# Patient Record
Sex: FEMALE | Race: WHITE | NOT HISPANIC OR LATINO
[De-identification: names, ages, dates, MRNs, and addresses within clinical notes are randomized per-mention and may not be internally consistent; named-entity substitution may affect disease eponyms.]

---

## 2017-03-06 ENCOUNTER — LABORATORY RESULT (OUTPATIENT)
Age: 62
End: 2017-03-06

## 2017-03-06 ENCOUNTER — APPOINTMENT (OUTPATIENT)
Dept: INFUSION THERAPY | Facility: CLINIC | Age: 62
End: 2017-03-06

## 2017-03-06 ENCOUNTER — APPOINTMENT (OUTPATIENT)
Dept: HEMATOLOGY ONCOLOGY | Facility: CLINIC | Age: 62
End: 2017-03-06

## 2017-03-06 VITALS
HEIGHT: 63 IN | TEMPERATURE: 98.6 F | DIASTOLIC BLOOD PRESSURE: 72 MMHG | HEART RATE: 72 BPM | WEIGHT: 203 LBS | SYSTOLIC BLOOD PRESSURE: 112 MMHG | BODY MASS INDEX: 35.97 KG/M2

## 2017-03-06 LAB
ALBUMIN SERPL ELPH-MCNC: 3.7 G/DL
ALP BLD-CCNC: 93 U/L
ALT SERPL-CCNC: 21 U/L
ANION GAP SERPL CALC-SCNC: 16 MMOL/L
AST SERPL-CCNC: 18 U/L
BILIRUB SERPL-MCNC: 0.7 MG/DL
BUN SERPL-MCNC: 14 MG/DL
CALCIUM SERPL-MCNC: 9 MG/DL
CHLORIDE SERPL-SCNC: 104 MMOL/L
CO2 SERPL-SCNC: 23 MMOL/L
CREAT SERPL-MCNC: 0.69 MG/DL
GLUCOSE SERPL-MCNC: 109 MG/DL
HCT VFR BLD CALC: 34 %
HGB BLD-MCNC: 12 G/DL
MCHC RBC-ENTMCNC: 29.9 PG
MCHC RBC-ENTMCNC: 35.2 GM/DL
MCV RBC AUTO: 84.9 FL
PLATELET # BLD AUTO: 125 K/UL
POTASSIUM SERPL-SCNC: 4.2 MMOL/L
PROT SERPL-MCNC: 5.8 G/DL
RBC # BLD: 4.01 M/UL
RBC # FLD: 11.5 %
SODIUM SERPL-SCNC: 143 MMOL/L
WBC # FLD AUTO: 3.9 K/UL

## 2017-03-10 PROBLEM — Z87.19 HISTORY OF ESOPHAGEAL REFLUX: Status: RESOLVED | Noted: 2017-03-10 | Resolved: 2017-03-10

## 2017-03-10 PROBLEM — Z79.899 PATIENT ON COMBINED CHEMOTHERAPY AND RADIATION: Status: RESOLVED | Noted: 2017-03-10 | Resolved: 2017-03-10

## 2017-03-10 LAB — VIT B12 SERPL-MCNC: 261 PG/ML

## 2017-03-13 ENCOUNTER — APPOINTMENT (OUTPATIENT)
Dept: INFUSION THERAPY | Facility: CLINIC | Age: 62
End: 2017-03-13

## 2017-03-13 ENCOUNTER — APPOINTMENT (OUTPATIENT)
Dept: HEMATOLOGY ONCOLOGY | Facility: CLINIC | Age: 62
End: 2017-03-13

## 2017-03-13 VITALS
DIASTOLIC BLOOD PRESSURE: 70 MMHG | SYSTOLIC BLOOD PRESSURE: 110 MMHG | HEIGHT: 63 IN | BODY MASS INDEX: 35.97 KG/M2 | TEMPERATURE: 98 F | HEART RATE: 70 BPM | WEIGHT: 203 LBS

## 2017-03-13 DIAGNOSIS — Z79.899 OTHER LONG TERM (CURRENT) DRUG THERAPY: ICD-10-CM

## 2017-03-13 DIAGNOSIS — E55.9 VITAMIN D DEFICIENCY, UNSPECIFIED: ICD-10-CM

## 2017-03-13 DIAGNOSIS — G62.9 POLYNEUROPATHY, UNSPECIFIED: ICD-10-CM

## 2017-03-13 DIAGNOSIS — S80.01XA CONTUSION OF RIGHT KNEE, INITIAL ENCOUNTER: ICD-10-CM

## 2017-03-13 DIAGNOSIS — M79.89 OTHER SPECIFIED SOFT TISSUE DISORDERS: ICD-10-CM

## 2017-03-13 DIAGNOSIS — K12.30 ORAL MUCOSITIS (ULCERATIVE), UNSPECIFIED: ICD-10-CM

## 2017-03-13 DIAGNOSIS — Z87.19 PERSONAL HISTORY OF OTHER DISEASES OF THE DIGESTIVE SYSTEM: ICD-10-CM

## 2017-03-13 DIAGNOSIS — Z78.9 OTHER LONG TERM (CURRENT) DRUG THERAPY: ICD-10-CM

## 2017-03-13 DIAGNOSIS — N30.40 IRRADIATION CYSTITIS W/OUT HEMATURIA: ICD-10-CM

## 2017-03-13 DIAGNOSIS — Z86.2 PERSONAL HISTORY OF DISEASES OF THE BLOOD AND BLOOD-FORMING ORGANS AND CERTAIN DISORDERS INVOLVING THE IMMUNE MECHANISM: ICD-10-CM

## 2017-03-13 DIAGNOSIS — Z86.19 PERSONAL HISTORY OF OTHER INFECTIOUS AND PARASITIC DISEASES: ICD-10-CM

## 2017-03-17 ENCOUNTER — APPOINTMENT (OUTPATIENT)
Dept: DERMATOLOGY | Facility: CLINIC | Age: 62
End: 2017-03-17

## 2017-03-17 DIAGNOSIS — Z78.9 OTHER SPECIFIED HEALTH STATUS: ICD-10-CM

## 2017-03-17 DIAGNOSIS — Z85.828 PERSONAL HISTORY OF OTHER MALIGNANT NEOPLASM OF SKIN: ICD-10-CM

## 2017-05-01 ENCOUNTER — OUTPATIENT (OUTPATIENT)
Dept: OUTPATIENT SERVICES | Facility: HOSPITAL | Age: 62
LOS: 1 days | Discharge: HOME | End: 2017-05-01
Payer: COMMERCIAL

## 2017-05-01 VITALS
TEMPERATURE: 98 F | OXYGEN SATURATION: 99 % | RESPIRATION RATE: 16 BRPM | SYSTOLIC BLOOD PRESSURE: 133 MMHG | DIASTOLIC BLOOD PRESSURE: 65 MMHG | HEART RATE: 78 BPM

## 2017-05-01 VITALS
RESPIRATION RATE: 16 BRPM | OXYGEN SATURATION: 100 % | TEMPERATURE: 98 F | SYSTOLIC BLOOD PRESSURE: 115 MMHG | HEIGHT: 63 IN | WEIGHT: 220.46 LBS | DIASTOLIC BLOOD PRESSURE: 80 MMHG | HEART RATE: 66 BPM

## 2017-05-01 DIAGNOSIS — Z98.890 OTHER SPECIFIED POSTPROCEDURAL STATES: Chronic | ICD-10-CM

## 2017-05-01 DIAGNOSIS — Z98.84 BARIATRIC SURGERY STATUS: Chronic | ICD-10-CM

## 2017-05-01 DIAGNOSIS — Z90.49 ACQUIRED ABSENCE OF OTHER SPECIFIED PARTS OF DIGESTIVE TRACT: Chronic | ICD-10-CM

## 2017-05-01 DIAGNOSIS — Z84.89 FAMILY HISTORY OF OTHER SPECIFIED CONDITIONS: Chronic | ICD-10-CM

## 2017-05-01 PROCEDURE — 93010 ELECTROCARDIOGRAM REPORT: CPT

## 2017-05-01 PROCEDURE — 36590 REMOVAL TUNNELED CV CATH: CPT

## 2017-05-01 NOTE — BRIEF OPERATIVE NOTE - OPERATION/FINDINGS
pre and post cxr. sharp and blunt dissection to expose port which was removed intact. pt juany proc well. no complications

## 2017-05-01 NOTE — ASU PATIENT PROFILE, ADULT - PMH
Degenerative disc disease, lumbar    Gastroesophageal reflux disease, esophagitis presence not specified    Lung nodule    Rectal cancer    Thrombocytopenia    Vitamin D deficiency

## 2017-05-01 NOTE — ASU PATIENT PROFILE, ADULT - PSH
FH: cholecystectomy    H/O umbilical hernia repair    History of partial colectomy    S/P left knee arthroscopy    Status post gastric banding

## 2017-05-03 ENCOUNTER — TRANSCRIPTION ENCOUNTER (OUTPATIENT)
Age: 62
End: 2017-05-03

## 2017-05-04 DIAGNOSIS — Z87.891 PERSONAL HISTORY OF NICOTINE DEPENDENCE: ICD-10-CM

## 2017-05-04 DIAGNOSIS — Z98.84 BARIATRIC SURGERY STATUS: ICD-10-CM

## 2017-05-04 DIAGNOSIS — Z46.89 ENCOUNTER FOR FITTING AND ADJUSTMENT OF OTHER SPECIFIED DEVICES: ICD-10-CM

## 2017-05-04 DIAGNOSIS — E66.01 MORBID (SEVERE) OBESITY DUE TO EXCESS CALORIES: ICD-10-CM

## 2017-05-04 DIAGNOSIS — Z92.3 PERSONAL HISTORY OF IRRADIATION: ICD-10-CM

## 2017-05-04 DIAGNOSIS — G47.33 OBSTRUCTIVE SLEEP APNEA (ADULT) (PEDIATRIC): ICD-10-CM

## 2017-05-04 DIAGNOSIS — Z92.21 PERSONAL HISTORY OF ANTINEOPLASTIC CHEMOTHERAPY: ICD-10-CM

## 2017-05-04 DIAGNOSIS — Z85.048 PERSONAL HISTORY OF OTHER MALIGNANT NEOPLASM OF RECTUM, RECTOSIGMOID JUNCTION, AND ANUS: ICD-10-CM

## 2017-05-04 DIAGNOSIS — K21.9 GASTRO-ESOPHAGEAL REFLUX DISEASE WITHOUT ESOPHAGITIS: ICD-10-CM

## 2017-05-12 ENCOUNTER — APPOINTMENT (OUTPATIENT)
Dept: ORTHOPEDIC SURGERY | Facility: CLINIC | Age: 62
End: 2017-05-12

## 2017-05-12 ENCOUNTER — CHART COPY (OUTPATIENT)
Age: 62
End: 2017-05-12

## 2017-05-12 VITALS
BODY MASS INDEX: 35.97 KG/M2 | DIASTOLIC BLOOD PRESSURE: 87 MMHG | HEART RATE: 75 BPM | WEIGHT: 203 LBS | HEIGHT: 63 IN | SYSTOLIC BLOOD PRESSURE: 132 MMHG

## 2017-05-15 ENCOUNTER — APPOINTMENT (OUTPATIENT)
Dept: ORTHOPEDIC SURGERY | Facility: CLINIC | Age: 62
End: 2017-05-15

## 2017-05-15 VITALS — BODY MASS INDEX: 35.97 KG/M2 | WEIGHT: 203 LBS | HEIGHT: 63 IN

## 2017-06-02 ENCOUNTER — APPOINTMENT (OUTPATIENT)
Dept: ORTHOPEDIC SURGERY | Facility: CLINIC | Age: 62
End: 2017-06-02

## 2017-06-02 VITALS
WEIGHT: 203 LBS | SYSTOLIC BLOOD PRESSURE: 138 MMHG | HEART RATE: 83 BPM | DIASTOLIC BLOOD PRESSURE: 70 MMHG | HEIGHT: 63 IN | BODY MASS INDEX: 35.97 KG/M2

## 2017-06-09 ENCOUNTER — APPOINTMENT (OUTPATIENT)
Dept: ORTHOPEDIC SURGERY | Facility: CLINIC | Age: 62
End: 2017-06-09

## 2017-06-09 VITALS
DIASTOLIC BLOOD PRESSURE: 75 MMHG | BODY MASS INDEX: 35.97 KG/M2 | SYSTOLIC BLOOD PRESSURE: 110 MMHG | WEIGHT: 203 LBS | HEIGHT: 63 IN | HEART RATE: 72 BPM

## 2017-06-16 ENCOUNTER — APPOINTMENT (OUTPATIENT)
Dept: ORTHOPEDIC SURGERY | Facility: CLINIC | Age: 62
End: 2017-06-16

## 2017-06-16 VITALS
SYSTOLIC BLOOD PRESSURE: 143 MMHG | HEART RATE: 65 BPM | DIASTOLIC BLOOD PRESSURE: 90 MMHG | HEIGHT: 63 IN | WEIGHT: 203 LBS | BODY MASS INDEX: 35.97 KG/M2

## 2017-06-19 ENCOUNTER — OUTPATIENT (OUTPATIENT)
Dept: OUTPATIENT SERVICES | Facility: HOSPITAL | Age: 62
LOS: 1 days | End: 2017-06-19
Payer: COMMERCIAL

## 2017-06-19 ENCOUNTER — APPOINTMENT (OUTPATIENT)
Dept: MRI IMAGING | Facility: CLINIC | Age: 62
End: 2017-06-19

## 2017-06-19 DIAGNOSIS — Z90.49 ACQUIRED ABSENCE OF OTHER SPECIFIED PARTS OF DIGESTIVE TRACT: Chronic | ICD-10-CM

## 2017-06-19 DIAGNOSIS — Z84.89 FAMILY HISTORY OF OTHER SPECIFIED CONDITIONS: Chronic | ICD-10-CM

## 2017-06-19 DIAGNOSIS — Z98.890 OTHER SPECIFIED POSTPROCEDURAL STATES: Chronic | ICD-10-CM

## 2017-06-19 DIAGNOSIS — Z00.8 ENCOUNTER FOR OTHER GENERAL EXAMINATION: ICD-10-CM

## 2017-06-19 DIAGNOSIS — Z98.84 BARIATRIC SURGERY STATUS: Chronic | ICD-10-CM

## 2017-06-19 PROCEDURE — 73221 MRI JOINT UPR EXTREM W/O DYE: CPT

## 2017-07-10 ENCOUNTER — APPOINTMENT (OUTPATIENT)
Dept: ORTHOPEDIC SURGERY | Facility: CLINIC | Age: 62
End: 2017-07-10

## 2017-07-10 VITALS
BODY MASS INDEX: 35.61 KG/M2 | HEIGHT: 63 IN | HEART RATE: 54 BPM | SYSTOLIC BLOOD PRESSURE: 119 MMHG | DIASTOLIC BLOOD PRESSURE: 75 MMHG | WEIGHT: 201 LBS

## 2017-07-10 RX ADMIN — METHYLPREDNISOLONE ACETATE 2 MG/ML: 40 INJECTION, SUSPENSION INTRALESIONAL; INTRAMUSCULAR; INTRASYNOVIAL; SOFT TISSUE at 00:00

## 2017-07-10 RX ADMIN — LIDOCAINE HYDROCHLORIDE 3 %: 10 INJECTION, SOLUTION EPIDURAL; INFILTRATION; INTRACAUDAL; PERINEURAL at 00:00

## 2017-07-13 RX ORDER — METHYLPRED ACET/NACL,ISO-OS/PF 40 MG/ML
40 VIAL (ML) INJECTION
Qty: 1 | Refills: 0 | Status: COMPLETED | OUTPATIENT
Start: 2017-07-10

## 2017-07-13 RX ORDER — LIDOCAINE HYDROCHLORIDE 10 MG/ML
1 INJECTION, SOLUTION INFILTRATION; PERINEURAL
Refills: 0 | Status: COMPLETED | OUTPATIENT
Start: 2017-07-10

## 2017-07-14 ENCOUNTER — APPOINTMENT (OUTPATIENT)
Dept: ORTHOPEDIC SURGERY | Facility: CLINIC | Age: 62
End: 2017-07-14

## 2017-09-29 ENCOUNTER — OUTPATIENT (OUTPATIENT)
Dept: OUTPATIENT SERVICES | Facility: HOSPITAL | Age: 62
LOS: 1 days | End: 2017-09-29
Payer: COMMERCIAL

## 2017-09-29 ENCOUNTER — APPOINTMENT (OUTPATIENT)
Dept: CT IMAGING | Facility: CLINIC | Age: 62
End: 2017-09-29
Payer: COMMERCIAL

## 2017-09-29 DIAGNOSIS — Z00.8 ENCOUNTER FOR OTHER GENERAL EXAMINATION: ICD-10-CM

## 2017-09-29 DIAGNOSIS — Z98.84 BARIATRIC SURGERY STATUS: Chronic | ICD-10-CM

## 2017-09-29 DIAGNOSIS — Z98.890 OTHER SPECIFIED POSTPROCEDURAL STATES: Chronic | ICD-10-CM

## 2017-09-29 DIAGNOSIS — Z84.89 FAMILY HISTORY OF OTHER SPECIFIED CONDITIONS: Chronic | ICD-10-CM

## 2017-09-29 DIAGNOSIS — Z90.49 ACQUIRED ABSENCE OF OTHER SPECIFIED PARTS OF DIGESTIVE TRACT: Chronic | ICD-10-CM

## 2017-09-29 PROCEDURE — 71260 CT THORAX DX C+: CPT | Mod: 26

## 2017-09-29 PROCEDURE — 71260 CT THORAX DX C+: CPT

## 2017-09-29 PROCEDURE — 74177 CT ABD & PELVIS W/CONTRAST: CPT

## 2017-09-29 PROCEDURE — 82565 ASSAY OF CREATININE: CPT

## 2017-09-29 PROCEDURE — 74177 CT ABD & PELVIS W/CONTRAST: CPT | Mod: 26

## 2018-01-03 ENCOUNTER — LABORATORY RESULT (OUTPATIENT)
Age: 63
End: 2018-01-03

## 2018-01-03 ENCOUNTER — APPOINTMENT (OUTPATIENT)
Dept: INFUSION THERAPY | Facility: CLINIC | Age: 63
End: 2018-01-03

## 2018-01-03 ENCOUNTER — APPOINTMENT (OUTPATIENT)
Dept: HEMATOLOGY ONCOLOGY | Facility: CLINIC | Age: 63
End: 2018-01-03
Payer: COMMERCIAL

## 2018-01-03 VITALS
BODY MASS INDEX: 37.56 KG/M2 | HEIGHT: 63 IN | TEMPERATURE: 98.8 F | DIASTOLIC BLOOD PRESSURE: 84 MMHG | SYSTOLIC BLOOD PRESSURE: 130 MMHG | HEART RATE: 83 BPM | WEIGHT: 212 LBS

## 2018-01-03 LAB
HCT VFR BLD CALC: 38.5 %
HGB BLD-MCNC: 13 G/DL
MCHC RBC-ENTMCNC: 29.6 PG
MCHC RBC-ENTMCNC: 33.8 GM/DL
MCV RBC AUTO: 87.4 FL
PLATELET # BLD AUTO: 167 K/UL
RBC # BLD: 4.4 M/UL
RBC # FLD: 11.6 %
WBC # FLD AUTO: 5.2 K/UL

## 2018-01-03 PROCEDURE — 99214 OFFICE O/P EST MOD 30 MIN: CPT | Mod: 25

## 2018-01-03 PROCEDURE — 36415 COLL VENOUS BLD VENIPUNCTURE: CPT

## 2018-01-03 PROCEDURE — 85025 COMPLETE CBC W/AUTO DIFF WBC: CPT

## 2018-01-03 PROCEDURE — 96372 THER/PROPH/DIAG INJ SC/IM: CPT | Mod: 59

## 2018-01-16 LAB
ALBUMIN SERPL ELPH-MCNC: 4 G/DL
ALP BLD-CCNC: 82 U/L
ALT SERPL-CCNC: 14 U/L
ANION GAP SERPL CALC-SCNC: 16 MMOL/L
AST SERPL-CCNC: 15 U/L
BILIRUB SERPL-MCNC: 0.5 MG/DL
BUN SERPL-MCNC: 16 MG/DL
CALCIUM SERPL-MCNC: 9.7 MG/DL
CEA SERPL-MCNC: 1.1 NG/ML
CHLORIDE SERPL-SCNC: 103 MMOL/L
CO2 SERPL-SCNC: 24 MMOL/L
CREAT SERPL-MCNC: 0.75 MG/DL
GLUCOSE SERPL-MCNC: 85 MG/DL
POTASSIUM SERPL-SCNC: 4.2 MMOL/L
PROT SERPL-MCNC: 6.1 G/DL
SODIUM SERPL-SCNC: 143 MMOL/L

## 2018-01-22 ENCOUNTER — APPOINTMENT (OUTPATIENT)
Dept: INTERNAL MEDICINE | Facility: CLINIC | Age: 63
End: 2018-01-22
Payer: COMMERCIAL

## 2018-01-22 VITALS
TEMPERATURE: 98.3 F | OXYGEN SATURATION: 99 % | DIASTOLIC BLOOD PRESSURE: 80 MMHG | HEART RATE: 94 BPM | SYSTOLIC BLOOD PRESSURE: 122 MMHG | HEIGHT: 63 IN | RESPIRATION RATE: 16 BRPM | BODY MASS INDEX: 36.14 KG/M2 | WEIGHT: 204 LBS

## 2018-01-22 DIAGNOSIS — R91.8 OTHER NONSPECIFIC ABNORMAL FINDING OF LUNG FIELD: ICD-10-CM

## 2018-01-22 PROCEDURE — 94727 GAS DIL/WSHOT DETER LNG VOL: CPT

## 2018-01-22 PROCEDURE — 99215 OFFICE O/P EST HI 40 MIN: CPT | Mod: 25

## 2018-01-22 PROCEDURE — 94060 EVALUATION OF WHEEZING: CPT

## 2018-01-22 PROCEDURE — 94729 DIFFUSING CAPACITY: CPT

## 2018-02-16 ENCOUNTER — APPOINTMENT (OUTPATIENT)
Dept: ORTHOPEDIC SURGERY | Facility: CLINIC | Age: 63
End: 2018-02-16

## 2018-03-14 ENCOUNTER — APPOINTMENT (OUTPATIENT)
Dept: ORTHOPEDIC SURGERY | Facility: CLINIC | Age: 63
End: 2018-03-14
Payer: COMMERCIAL

## 2018-03-14 VITALS
HEART RATE: 78 BPM | BODY MASS INDEX: 36.14 KG/M2 | WEIGHT: 204 LBS | HEIGHT: 63 IN | SYSTOLIC BLOOD PRESSURE: 141 MMHG | DIASTOLIC BLOOD PRESSURE: 84 MMHG

## 2018-03-14 PROCEDURE — 99214 OFFICE O/P EST MOD 30 MIN: CPT

## 2018-03-14 PROCEDURE — 72100 X-RAY EXAM L-S SPINE 2/3 VWS: CPT

## 2018-04-03 ENCOUNTER — APPOINTMENT (OUTPATIENT)
Dept: OBGYN | Facility: CLINIC | Age: 63
End: 2018-04-03
Payer: COMMERCIAL

## 2018-04-03 VITALS
SYSTOLIC BLOOD PRESSURE: 115 MMHG | BODY MASS INDEX: 38.28 KG/M2 | DIASTOLIC BLOOD PRESSURE: 70 MMHG | HEIGHT: 63 IN | WEIGHT: 216.05 LBS

## 2018-04-03 DIAGNOSIS — Z87.09 PERSONAL HISTORY OF OTHER DISEASES OF THE RESPIRATORY SYSTEM: ICD-10-CM

## 2018-04-03 PROCEDURE — 99396 PREV VISIT EST AGE 40-64: CPT

## 2018-04-03 PROCEDURE — 36415 COLL VENOUS BLD VENIPUNCTURE: CPT

## 2018-04-04 LAB — HPV HIGH+LOW RISK DNA PNL CVX: NOT DETECTED

## 2018-04-06 LAB — CYTOLOGY CVX/VAG DOC THIN PREP: NORMAL

## 2018-04-20 ENCOUNTER — APPOINTMENT (OUTPATIENT)
Dept: ORTHOPEDIC SURGERY | Facility: CLINIC | Age: 63
End: 2018-04-20
Payer: COMMERCIAL

## 2018-04-20 ENCOUNTER — CHART COPY (OUTPATIENT)
Age: 63
End: 2018-04-20

## 2018-04-20 VITALS
WEIGHT: 216 LBS | HEART RATE: 73 BPM | HEIGHT: 63 IN | SYSTOLIC BLOOD PRESSURE: 125 MMHG | BODY MASS INDEX: 38.27 KG/M2 | DIASTOLIC BLOOD PRESSURE: 79 MMHG

## 2018-04-20 PROCEDURE — 99214 OFFICE O/P EST MOD 30 MIN: CPT | Mod: 25

## 2018-04-20 PROCEDURE — 20610 DRAIN/INJ JOINT/BURSA W/O US: CPT | Mod: RT

## 2018-04-20 PROCEDURE — 73562 X-RAY EXAM OF KNEE 3: CPT | Mod: LT

## 2018-04-20 RX ORDER — SODIUM SULFATE, POTASSIUM SULFATE, MAGNESIUM SULFATE 17.5; 3.13; 1.6 G/ML; G/ML; G/ML
17.5-3.13-1.6 SOLUTION, CONCENTRATE ORAL
Qty: 354 | Refills: 0 | Status: COMPLETED | COMMUNITY
Start: 2018-04-11

## 2018-04-23 ENCOUNTER — APPOINTMENT (OUTPATIENT)
Dept: INTERNAL MEDICINE | Facility: CLINIC | Age: 63
End: 2018-04-23

## 2018-04-25 ENCOUNTER — APPOINTMENT (OUTPATIENT)
Dept: INTERNAL MEDICINE | Facility: CLINIC | Age: 63
End: 2018-04-25

## 2018-04-26 RX ORDER — CELECOXIB 200 MG/1
200 CAPSULE ORAL DAILY
Qty: 10 | Refills: 0 | Status: DISCONTINUED | COMMUNITY
Start: 2018-04-20 | End: 2018-04-26

## 2018-04-29 ENCOUNTER — FORM ENCOUNTER (OUTPATIENT)
Age: 63
End: 2018-04-29

## 2018-04-30 ENCOUNTER — APPOINTMENT (OUTPATIENT)
Dept: MAMMOGRAPHY | Facility: CLINIC | Age: 63
End: 2018-04-30
Payer: COMMERCIAL

## 2018-04-30 ENCOUNTER — OUTPATIENT (OUTPATIENT)
Dept: OUTPATIENT SERVICES | Facility: HOSPITAL | Age: 63
LOS: 1 days | End: 2018-04-30
Payer: COMMERCIAL

## 2018-04-30 ENCOUNTER — APPOINTMENT (OUTPATIENT)
Dept: ULTRASOUND IMAGING | Facility: CLINIC | Age: 63
End: 2018-04-30
Payer: COMMERCIAL

## 2018-04-30 DIAGNOSIS — Z98.890 OTHER SPECIFIED POSTPROCEDURAL STATES: Chronic | ICD-10-CM

## 2018-04-30 DIAGNOSIS — Z00.8 ENCOUNTER FOR OTHER GENERAL EXAMINATION: ICD-10-CM

## 2018-04-30 DIAGNOSIS — Z98.84 BARIATRIC SURGERY STATUS: Chronic | ICD-10-CM

## 2018-04-30 DIAGNOSIS — Z90.49 ACQUIRED ABSENCE OF OTHER SPECIFIED PARTS OF DIGESTIVE TRACT: Chronic | ICD-10-CM

## 2018-04-30 DIAGNOSIS — Z84.89 FAMILY HISTORY OF OTHER SPECIFIED CONDITIONS: Chronic | ICD-10-CM

## 2018-04-30 PROCEDURE — 76641 ULTRASOUND BREAST COMPLETE: CPT | Mod: 26,50

## 2018-04-30 PROCEDURE — 77063 BREAST TOMOSYNTHESIS BI: CPT | Mod: 26

## 2018-04-30 PROCEDURE — 76641 ULTRASOUND BREAST COMPLETE: CPT

## 2018-04-30 PROCEDURE — 77063 BREAST TOMOSYNTHESIS BI: CPT

## 2018-04-30 PROCEDURE — 77067 SCR MAMMO BI INCL CAD: CPT | Mod: 26

## 2018-04-30 PROCEDURE — 77067 SCR MAMMO BI INCL CAD: CPT

## 2018-05-04 ENCOUNTER — APPOINTMENT (OUTPATIENT)
Dept: ORTHOPEDIC SURGERY | Facility: CLINIC | Age: 63
End: 2018-05-04
Payer: COMMERCIAL

## 2018-05-04 VITALS
WEIGHT: 216 LBS | SYSTOLIC BLOOD PRESSURE: 118 MMHG | HEIGHT: 63 IN | BODY MASS INDEX: 38.27 KG/M2 | HEART RATE: 68 BPM | DIASTOLIC BLOOD PRESSURE: 75 MMHG

## 2018-05-04 PROCEDURE — 20610 DRAIN/INJ JOINT/BURSA W/O US: CPT | Mod: RT

## 2018-05-09 ENCOUNTER — OUTPATIENT (OUTPATIENT)
Dept: OUTPATIENT SERVICES | Facility: HOSPITAL | Age: 63
LOS: 1 days | End: 2018-05-09
Payer: COMMERCIAL

## 2018-05-09 VITALS
RESPIRATION RATE: 16 BRPM | WEIGHT: 216.93 LBS | DIASTOLIC BLOOD PRESSURE: 77 MMHG | SYSTOLIC BLOOD PRESSURE: 125 MMHG | TEMPERATURE: 97 F | HEART RATE: 59 BPM

## 2018-05-09 DIAGNOSIS — Z29.9 ENCOUNTER FOR PROPHYLACTIC MEASURES, UNSPECIFIED: ICD-10-CM

## 2018-05-09 DIAGNOSIS — G47.30 SLEEP APNEA, UNSPECIFIED: ICD-10-CM

## 2018-05-09 DIAGNOSIS — Z90.49 ACQUIRED ABSENCE OF OTHER SPECIFIED PARTS OF DIGESTIVE TRACT: Chronic | ICD-10-CM

## 2018-05-09 DIAGNOSIS — Z98.890 OTHER SPECIFIED POSTPROCEDURAL STATES: Chronic | ICD-10-CM

## 2018-05-09 DIAGNOSIS — Z01.818 ENCOUNTER FOR OTHER PREPROCEDURAL EXAMINATION: ICD-10-CM

## 2018-05-09 DIAGNOSIS — Z87.19 PERSONAL HISTORY OF OTHER DISEASES OF THE DIGESTIVE SYSTEM: Chronic | ICD-10-CM

## 2018-05-09 DIAGNOSIS — M25.561 PAIN IN RIGHT KNEE: ICD-10-CM

## 2018-05-09 DIAGNOSIS — Z98.891 HISTORY OF UTERINE SCAR FROM PREVIOUS SURGERY: Chronic | ICD-10-CM

## 2018-05-09 DIAGNOSIS — Z98.84 BARIATRIC SURGERY STATUS: Chronic | ICD-10-CM

## 2018-05-09 DIAGNOSIS — Z87.39 PERSONAL HISTORY OF OTHER DISEASES OF THE MUSCULOSKELETAL SYSTEM AND CONNECTIVE TISSUE: Chronic | ICD-10-CM

## 2018-05-09 DIAGNOSIS — Z84.89 FAMILY HISTORY OF OTHER SPECIFIED CONDITIONS: Chronic | ICD-10-CM

## 2018-05-09 LAB
ALBUMIN SERPL ELPH-MCNC: 4.3 G/DL — SIGNIFICANT CHANGE UP (ref 3.3–5.2)
ALP SERPL-CCNC: 86 U/L — SIGNIFICANT CHANGE UP (ref 40–120)
ALT FLD-CCNC: 14 U/L — SIGNIFICANT CHANGE UP
ANION GAP SERPL CALC-SCNC: 12 MMOL/L — SIGNIFICANT CHANGE UP (ref 5–17)
APTT BLD: 32.4 SEC — SIGNIFICANT CHANGE UP (ref 27.5–37.4)
AST SERPL-CCNC: 13 U/L — SIGNIFICANT CHANGE UP
BASOPHILS # BLD AUTO: 0 K/UL — SIGNIFICANT CHANGE UP (ref 0–0.2)
BASOPHILS NFR BLD AUTO: 0.5 % — SIGNIFICANT CHANGE UP (ref 0–2)
BILIRUB SERPL-MCNC: 0.8 MG/DL — SIGNIFICANT CHANGE UP (ref 0.4–2)
BLD GP AB SCN SERPL QL: SIGNIFICANT CHANGE UP
BUN SERPL-MCNC: 18 MG/DL — SIGNIFICANT CHANGE UP (ref 8–20)
CALCIUM SERPL-MCNC: 9.8 MG/DL — SIGNIFICANT CHANGE UP (ref 8.6–10.2)
CHLORIDE SERPL-SCNC: 105 MMOL/L — SIGNIFICANT CHANGE UP (ref 98–107)
CO2 SERPL-SCNC: 27 MMOL/L — SIGNIFICANT CHANGE UP (ref 22–29)
CREAT SERPL-MCNC: 0.54 MG/DL — SIGNIFICANT CHANGE UP (ref 0.5–1.3)
EOSINOPHIL # BLD AUTO: 0.2 K/UL — SIGNIFICANT CHANGE UP (ref 0–0.5)
EOSINOPHIL NFR BLD AUTO: 4.4 % — SIGNIFICANT CHANGE UP (ref 0–6)
GLUCOSE SERPL-MCNC: 90 MG/DL — SIGNIFICANT CHANGE UP (ref 70–115)
HCT VFR BLD CALC: 38.8 % — SIGNIFICANT CHANGE UP (ref 37–47)
HGB BLD-MCNC: 12.9 G/DL — SIGNIFICANT CHANGE UP (ref 12–16)
INR BLD: 0.94 RATIO — SIGNIFICANT CHANGE UP (ref 0.88–1.16)
LYMPHOCYTES # BLD AUTO: 0.8 K/UL — LOW (ref 1–4.8)
LYMPHOCYTES # BLD AUTO: 19.5 % — LOW (ref 20–55)
MCHC RBC-ENTMCNC: 28.9 PG — SIGNIFICANT CHANGE UP (ref 27–31)
MCHC RBC-ENTMCNC: 33.2 G/DL — SIGNIFICANT CHANGE UP (ref 32–36)
MCV RBC AUTO: 86.8 FL — SIGNIFICANT CHANGE UP (ref 81–99)
MONOCYTES # BLD AUTO: 0.4 K/UL — SIGNIFICANT CHANGE UP (ref 0–0.8)
MONOCYTES NFR BLD AUTO: 8 % — SIGNIFICANT CHANGE UP (ref 3–10)
MRSA PCR RESULT.: SIGNIFICANT CHANGE UP
NEUTROPHILS # BLD AUTO: 3 K/UL — SIGNIFICANT CHANGE UP (ref 1.8–8)
NEUTROPHILS NFR BLD AUTO: 67.6 % — SIGNIFICANT CHANGE UP (ref 37–73)
PLATELET # BLD AUTO: 141 K/UL — LOW (ref 150–400)
POTASSIUM SERPL-MCNC: 4.7 MMOL/L — SIGNIFICANT CHANGE UP (ref 3.5–5.3)
POTASSIUM SERPL-SCNC: 4.7 MMOL/L — SIGNIFICANT CHANGE UP (ref 3.5–5.3)
PROT SERPL-MCNC: 6.9 G/DL — SIGNIFICANT CHANGE UP (ref 6.6–8.7)
PROTHROM AB SERPL-ACNC: 10.3 SEC — SIGNIFICANT CHANGE UP (ref 9.8–12.7)
RBC # BLD: 4.47 M/UL — SIGNIFICANT CHANGE UP (ref 4.4–5.2)
RBC # FLD: 12.9 % — SIGNIFICANT CHANGE UP (ref 11–15.6)
S AUREUS DNA NOSE QL NAA+PROBE: SIGNIFICANT CHANGE UP
SODIUM SERPL-SCNC: 144 MMOL/L — SIGNIFICANT CHANGE UP (ref 135–145)
TYPE + AB SCN PNL BLD: SIGNIFICANT CHANGE UP
WBC # BLD: 4.4 K/UL — LOW (ref 4.8–10.8)
WBC # FLD AUTO: 4.4 K/UL — LOW (ref 4.8–10.8)

## 2018-05-09 PROCEDURE — 86850 RBC ANTIBODY SCREEN: CPT

## 2018-05-09 PROCEDURE — 86900 BLOOD TYPING SEROLOGIC ABO: CPT

## 2018-05-09 PROCEDURE — 86901 BLOOD TYPING SEROLOGIC RH(D): CPT

## 2018-05-09 PROCEDURE — 36415 COLL VENOUS BLD VENIPUNCTURE: CPT

## 2018-05-09 PROCEDURE — 85730 THROMBOPLASTIN TIME PARTIAL: CPT

## 2018-05-09 PROCEDURE — 80053 COMPREHEN METABOLIC PANEL: CPT

## 2018-05-09 PROCEDURE — 87641 MR-STAPH DNA AMP PROBE: CPT

## 2018-05-09 PROCEDURE — 93010 ELECTROCARDIOGRAM REPORT: CPT

## 2018-05-09 PROCEDURE — G0463: CPT

## 2018-05-09 PROCEDURE — 85610 PROTHROMBIN TIME: CPT

## 2018-05-09 PROCEDURE — 93005 ELECTROCARDIOGRAM TRACING: CPT

## 2018-05-09 PROCEDURE — 87640 STAPH A DNA AMP PROBE: CPT

## 2018-05-09 PROCEDURE — 85027 COMPLETE CBC AUTOMATED: CPT

## 2018-05-09 RX ORDER — SODIUM CHLORIDE 9 MG/ML
3 INJECTION INTRAMUSCULAR; INTRAVENOUS; SUBCUTANEOUS EVERY 8 HOURS
Qty: 0 | Refills: 0 | Status: DISCONTINUED | OUTPATIENT
Start: 2018-05-24 | End: 2018-05-24

## 2018-05-09 NOTE — H&P PST ADULT - ASSESSMENT
Pleasant 63 year old female in NAD presents for left knee replacement.  CAPRINI SCORE [CLOT]    AGE RELATED RISK FACTORS                                                       MOBILITY RELATED FACTORS  [ ] Age 41-60 years                                            (1 Point)                  [ ] Bed rest                                                        (1 Point)  [X ] Age: 61-74 years                                           (2 Points)                 [ ] Plaster cast                                                   (2 Points)  [ ] Age= 75 years                                              (3 Points)                 [ ] Bed bound for more than 72 hours                 (2 Points)    DISEASE RELATED RISK FACTORS                                               GENDER SPECIFIC FACTORS  [ X] Edema in the lower extremities                       (1 Point)                  [ ] Pregnancy                                                     (1 Point)  [X ] Varicose veins                                               (1 Point)                  [ ] Post-partum < 6 weeks                                   (1 Point)             [X ] BMI > 25 Kg/m2                                            (1 Point)                  [ ] Hormonal therapy  or oral contraception          (1 Point)                 [ ] Sepsis (in the previous month)                        (1 Point)                  [ ] History of pregnancy complications                 (1 point)  [ ] Pneumonia or serious lung disease                                               [ ] Unexplained or recurrent                     (1 Point)           (in the previous month)                               (1 Point)  [ ] Abnormal pulmonary function test                     (1 Point)                 SURGERY RELATED RISK FACTORS  [ ] Acute myocardial infarction                              (1 Point)                 [ ]  Section                                             (1 Point)  [ ] Congestive heart failure (in the previous month)  (1 Point)               [ ] Minor surgery                                                  (1 Point)   [ ] Inflammatory bowel disease                             (1 Point)                 [ ] Arthroscopic surgery                                        (2 Points)  [ ] Central venous access                                      (2 Points)                [ ] General surgery lasting more than 45 minutes   (2 Points)       [ ] Stroke (in the previous month)                          (5 Points)               [ X] Elective arthroplasty                                         (5 Points)                                                                                                                                               HEMATOLOGY RELATED FACTORS                                                 TRAUMA RELATED RISK FACTORS  [ ] Prior episodes of VTE                                     (3 Points)                 [ ] Fracture of the hip, pelvis, or leg                       (5 Points)  [ ] Positive family history for VTE                         (3 Points)                 [ ] Acute spinal cord injury (in the previous month)  (5 Points)  [ ] Prothrombin 28935 A                                     (3 Points)                 [ ] Paralysis  (less than 1 month)                             (5 Points)  [ ] Factor V Leiden                                             (3 Points)                  [ ] Multiple Trauma within 1 month                        (5 Points)  [ ] Lupus anticoagulants                                     (3 Points)                                                           [ ] Anticardiolipin antibodies                               (3 Points)                                                       [ ] High homocysteine in the blood                      (3 Points)                                             [ ] Other congenital or acquired thrombophilia      (3 Points)                                                [ ] Heparin induced thrombocytopenia                  (3 Points)                                          Total Score [         10 ]

## 2018-05-09 NOTE — PATIENT PROFILE ADULT. - LEARNING ASSESSMENT (PATIENT) ADDITIONAL COMMENTS
Educated on pain scale and management.  Tips for safer surgery and pre op instructions given. Demonstrates understanding.

## 2018-05-09 NOTE — H&P PST ADULT - PMH
Arthritis    Degenerative disc disease, lumbar    Gastroesophageal reflux disease, esophagitis presence not specified    Knee pain, bilateral  left worse  Lung nodule    Rectal cancer  2016 chemo radiation  Sleep apnea  cpap  pressure at 7  Thrombocytopenia  while on chemo  Vitamin D deficiency

## 2018-05-09 NOTE — H&P PST ADULT - FAMILY HISTORY
Father  Still living? No  Family history of diabetes mellitus, Age at diagnosis: Age Unknown  Family history of Alzheimer's disease, Age at diagnosis: Age Unknown     Mother  Still living? No  Family history of hypertension, Age at diagnosis: Age Unknown

## 2018-05-09 NOTE — H&P PST ADULT - PSH
FH: cholecystectomy    H/O hiatal hernia    H/O umbilical hernia repair    History of  section  X2    .  History of partial colectomy    History of trigger finger  right  S/P cholecystectomy    S/P left knee arthroscopy    Status post gastric banding  band removed and sleeve procedure done

## 2018-05-09 NOTE — H&P PST ADULT - HISTORY OF PRESENT ILLNESS
Pleasant 63 year old female presents with c/o bilateral knee pain for years. Left worse than right . Ambulates without assistance , takes meloxicam for pain with relief. Xrays done and bone on bone noted, pt scheduled for left knee replacement.

## 2018-05-11 ENCOUNTER — APPOINTMENT (OUTPATIENT)
Dept: ORTHOPEDIC SURGERY | Facility: CLINIC | Age: 63
End: 2018-05-11
Payer: COMMERCIAL

## 2018-05-11 VITALS
HEIGHT: 63 IN | SYSTOLIC BLOOD PRESSURE: 136 MMHG | DIASTOLIC BLOOD PRESSURE: 74 MMHG | HEART RATE: 61 BPM | BODY MASS INDEX: 38.27 KG/M2 | WEIGHT: 216 LBS

## 2018-05-11 PROCEDURE — 20610 DRAIN/INJ JOINT/BURSA W/O US: CPT | Mod: RT

## 2018-05-18 ENCOUNTER — APPOINTMENT (OUTPATIENT)
Dept: ORTHOPEDIC SURGERY | Facility: CLINIC | Age: 63
End: 2018-05-18
Payer: COMMERCIAL

## 2018-05-18 PROCEDURE — 20610 DRAIN/INJ JOINT/BURSA W/O US: CPT | Mod: RT

## 2018-05-24 ENCOUNTER — APPOINTMENT (OUTPATIENT)
Dept: ORTHOPEDIC SURGERY | Facility: HOSPITAL | Age: 63
End: 2018-05-24

## 2018-05-24 ENCOUNTER — TRANSCRIPTION ENCOUNTER (OUTPATIENT)
Age: 63
End: 2018-05-24

## 2018-05-24 ENCOUNTER — RESULT REVIEW (OUTPATIENT)
Age: 63
End: 2018-05-24

## 2018-05-24 ENCOUNTER — INPATIENT (INPATIENT)
Facility: HOSPITAL | Age: 63
LOS: 0 days | Discharge: HOME | DRG: 470 | End: 2018-05-25
Attending: ORTHOPAEDIC SURGERY | Admitting: ORTHOPAEDIC SURGERY
Payer: COMMERCIAL

## 2018-05-24 VITALS
HEIGHT: 63 IN | TEMPERATURE: 98 F | DIASTOLIC BLOOD PRESSURE: 83 MMHG | HEART RATE: 66 BPM | OXYGEN SATURATION: 99 % | WEIGHT: 216.93 LBS | RESPIRATION RATE: 16 BRPM | SYSTOLIC BLOOD PRESSURE: 152 MMHG

## 2018-05-24 DIAGNOSIS — Z90.49 ACQUIRED ABSENCE OF OTHER SPECIFIED PARTS OF DIGESTIVE TRACT: Chronic | ICD-10-CM

## 2018-05-24 DIAGNOSIS — Z98.890 OTHER SPECIFIED POSTPROCEDURAL STATES: Chronic | ICD-10-CM

## 2018-05-24 DIAGNOSIS — Z98.891 HISTORY OF UTERINE SCAR FROM PREVIOUS SURGERY: Chronic | ICD-10-CM

## 2018-05-24 DIAGNOSIS — K21.9 GASTRO-ESOPHAGEAL REFLUX DISEASE WITHOUT ESOPHAGITIS: ICD-10-CM

## 2018-05-24 DIAGNOSIS — Z98.84 BARIATRIC SURGERY STATUS: Chronic | ICD-10-CM

## 2018-05-24 DIAGNOSIS — Z96.652 PRESENCE OF LEFT ARTIFICIAL KNEE JOINT: ICD-10-CM

## 2018-05-24 DIAGNOSIS — R91.1 SOLITARY PULMONARY NODULE: ICD-10-CM

## 2018-05-24 DIAGNOSIS — Z00.00 ENCOUNTER FOR GENERAL ADULT MEDICAL EXAMINATION WITHOUT ABNORMAL FINDINGS: ICD-10-CM

## 2018-05-24 DIAGNOSIS — G47.30 SLEEP APNEA, UNSPECIFIED: ICD-10-CM

## 2018-05-24 DIAGNOSIS — Z87.39 PERSONAL HISTORY OF OTHER DISEASES OF THE MUSCULOSKELETAL SYSTEM AND CONNECTIVE TISSUE: Chronic | ICD-10-CM

## 2018-05-24 DIAGNOSIS — Z84.89 FAMILY HISTORY OF OTHER SPECIFIED CONDITIONS: Chronic | ICD-10-CM

## 2018-05-24 DIAGNOSIS — Z86.2 PERSONAL HISTORY OF DISEASES OF THE BLOOD AND BLOOD-FORMING ORGANS AND CERTAIN DISORDERS INVOLVING THE IMMUNE MECHANISM: ICD-10-CM

## 2018-05-24 DIAGNOSIS — Z87.19 PERSONAL HISTORY OF OTHER DISEASES OF THE DIGESTIVE SYSTEM: Chronic | ICD-10-CM

## 2018-05-24 DIAGNOSIS — M17.12 UNILATERAL PRIMARY OSTEOARTHRITIS, LEFT KNEE: ICD-10-CM

## 2018-05-24 DIAGNOSIS — M17.0 BILATERAL PRIMARY OSTEOARTHRITIS OF KNEE: ICD-10-CM

## 2018-05-24 DIAGNOSIS — Z85.048 PERSONAL HISTORY OF OTHER MALIGNANT NEOPLASM OF RECTUM, RECTOSIGMOID JUNCTION, AND ANUS: ICD-10-CM

## 2018-05-24 PROCEDURE — 88305 TISSUE EXAM BY PATHOLOGIST: CPT | Mod: 26

## 2018-05-24 PROCEDURE — 27447 TOTAL KNEE ARTHROPLASTY: CPT | Mod: AS,LT

## 2018-05-24 PROCEDURE — 88311 DECALCIFY TISSUE: CPT | Mod: 26

## 2018-05-24 PROCEDURE — 27447 TOTAL KNEE ARTHROPLASTY: CPT | Mod: LT

## 2018-05-24 PROCEDURE — 73560 X-RAY EXAM OF KNEE 1 OR 2: CPT | Mod: 26,LT

## 2018-05-24 PROCEDURE — 99222 1ST HOSP IP/OBS MODERATE 55: CPT

## 2018-05-24 RX ORDER — HYDROMORPHONE HYDROCHLORIDE 2 MG/ML
0.5 INJECTION INTRAMUSCULAR; INTRAVENOUS; SUBCUTANEOUS
Qty: 0 | Refills: 0 | Status: DISCONTINUED | OUTPATIENT
Start: 2018-05-24 | End: 2018-05-25

## 2018-05-24 RX ORDER — VANCOMYCIN HCL 1 G
1500 VIAL (EA) INTRAVENOUS
Qty: 0 | Refills: 0 | Status: COMPLETED | OUTPATIENT
Start: 2018-05-24 | End: 2018-05-25

## 2018-05-24 RX ORDER — HYDROMORPHONE HYDROCHLORIDE 2 MG/ML
2 INJECTION INTRAMUSCULAR; INTRAVENOUS; SUBCUTANEOUS
Qty: 0 | Refills: 0 | Status: DISCONTINUED | OUTPATIENT
Start: 2018-05-24 | End: 2018-05-25

## 2018-05-24 RX ORDER — ACETAMINOPHEN 500 MG
1000 TABLET ORAL
Qty: 0 | Refills: 0 | Status: COMPLETED | OUTPATIENT
Start: 2018-05-24 | End: 2018-05-24

## 2018-05-24 RX ORDER — DOCUSATE SODIUM 100 MG
100 CAPSULE ORAL THREE TIMES A DAY
Qty: 0 | Refills: 0 | Status: DISCONTINUED | OUTPATIENT
Start: 2018-05-24 | End: 2018-05-25

## 2018-05-24 RX ORDER — OXYCODONE HYDROCHLORIDE 5 MG/1
10 TABLET ORAL
Qty: 0 | Refills: 0 | Status: DISCONTINUED | OUTPATIENT
Start: 2018-05-24 | End: 2018-05-25

## 2018-05-24 RX ORDER — FENTANYL CITRATE 50 UG/ML
25 INJECTION INTRAVENOUS
Qty: 0 | Refills: 0 | Status: DISCONTINUED | OUTPATIENT
Start: 2018-05-24 | End: 2018-05-24

## 2018-05-24 RX ORDER — OXYCODONE HYDROCHLORIDE 5 MG/1
10 TABLET ORAL EVERY 12 HOURS
Qty: 0 | Refills: 0 | Status: DISCONTINUED | OUTPATIENT
Start: 2018-05-24 | End: 2018-05-25

## 2018-05-24 RX ORDER — SODIUM CHLORIDE 9 MG/ML
1000 INJECTION, SOLUTION INTRAVENOUS
Qty: 0 | Refills: 0 | Status: DISCONTINUED | OUTPATIENT
Start: 2018-05-24 | End: 2018-05-24

## 2018-05-24 RX ORDER — PANTOPRAZOLE SODIUM 20 MG/1
40 TABLET, DELAYED RELEASE ORAL DAILY
Qty: 0 | Refills: 0 | Status: DISCONTINUED | OUTPATIENT
Start: 2018-05-24 | End: 2018-05-25

## 2018-05-24 RX ORDER — VANCOMYCIN HCL 1 G
1500 VIAL (EA) INTRAVENOUS ONCE
Qty: 0 | Refills: 0 | Status: COMPLETED | OUTPATIENT
Start: 2018-05-24 | End: 2018-05-24

## 2018-05-24 RX ORDER — SODIUM CHLORIDE 9 MG/ML
1000 INJECTION, SOLUTION INTRAVENOUS
Qty: 0 | Refills: 0 | Status: DISCONTINUED | OUTPATIENT
Start: 2018-05-24 | End: 2018-05-25

## 2018-05-24 RX ORDER — TRANEXAMIC ACID 100 MG/ML
1000 INJECTION, SOLUTION INTRAVENOUS ONCE
Qty: 0 | Refills: 0 | Status: COMPLETED | OUTPATIENT
Start: 2018-05-24 | End: 2018-05-24

## 2018-05-24 RX ORDER — CEFAZOLIN SODIUM 1 G
2000 VIAL (EA) INJECTION ONCE
Qty: 0 | Refills: 0 | Status: COMPLETED | OUTPATIENT
Start: 2018-05-24 | End: 2018-05-24

## 2018-05-24 RX ORDER — POLYETHYLENE GLYCOL 3350 17 G/17G
17 POWDER, FOR SOLUTION ORAL DAILY
Qty: 0 | Refills: 0 | Status: DISCONTINUED | OUTPATIENT
Start: 2018-05-24 | End: 2018-05-25

## 2018-05-24 RX ORDER — FOLIC ACID 0.8 MG
1 TABLET ORAL DAILY
Qty: 0 | Refills: 0 | Status: DISCONTINUED | OUTPATIENT
Start: 2018-05-24 | End: 2018-05-25

## 2018-05-24 RX ORDER — OXYCODONE HYDROCHLORIDE 5 MG/1
20 TABLET ORAL ONCE
Qty: 0 | Refills: 0 | Status: DISCONTINUED | OUTPATIENT
Start: 2018-05-24 | End: 2018-05-24

## 2018-05-24 RX ORDER — ONDANSETRON 8 MG/1
4 TABLET, FILM COATED ORAL ONCE
Qty: 0 | Refills: 0 | Status: COMPLETED | OUTPATIENT
Start: 2018-05-24 | End: 2018-05-24

## 2018-05-24 RX ORDER — ASPIRIN/CALCIUM CARB/MAGNESIUM 324 MG
162 TABLET ORAL
Qty: 0 | Refills: 0 | Status: DISCONTINUED | OUTPATIENT
Start: 2018-05-25 | End: 2018-05-25

## 2018-05-24 RX ORDER — MAGNESIUM HYDROXIDE 400 MG/1
30 TABLET, CHEWABLE ORAL DAILY
Qty: 0 | Refills: 0 | Status: DISCONTINUED | OUTPATIENT
Start: 2018-05-26 | End: 2018-05-25

## 2018-05-24 RX ORDER — FERROUS SULFATE 325(65) MG
325 TABLET ORAL DAILY
Qty: 0 | Refills: 0 | Status: DISCONTINUED | OUTPATIENT
Start: 2018-05-24 | End: 2018-05-25

## 2018-05-24 RX ORDER — SENNA PLUS 8.6 MG/1
2 TABLET ORAL AT BEDTIME
Qty: 0 | Refills: 0 | Status: DISCONTINUED | OUTPATIENT
Start: 2018-05-24 | End: 2018-05-25

## 2018-05-24 RX ORDER — ASCORBIC ACID 60 MG
500 TABLET,CHEWABLE ORAL
Qty: 0 | Refills: 0 | Status: DISCONTINUED | OUTPATIENT
Start: 2018-05-24 | End: 2018-05-25

## 2018-05-24 RX ORDER — ACETAMINOPHEN 500 MG
1000 TABLET ORAL ONCE
Qty: 0 | Refills: 0 | Status: COMPLETED | OUTPATIENT
Start: 2018-05-24 | End: 2018-05-24

## 2018-05-24 RX ORDER — OXYCODONE HYDROCHLORIDE 5 MG/1
5 TABLET ORAL
Qty: 0 | Refills: 0 | Status: DISCONTINUED | OUTPATIENT
Start: 2018-05-24 | End: 2018-05-25

## 2018-05-24 RX ORDER — ACETAMINOPHEN 500 MG
650 TABLET ORAL EVERY 6 HOURS
Qty: 0 | Refills: 0 | Status: DISCONTINUED | OUTPATIENT
Start: 2018-05-24 | End: 2018-05-25

## 2018-05-24 RX ORDER — CEFAZOLIN SODIUM 1 G
2000 VIAL (EA) INJECTION
Qty: 0 | Refills: 0 | Status: DISCONTINUED | OUTPATIENT
Start: 2018-05-24 | End: 2018-05-24

## 2018-05-24 RX ORDER — CELECOXIB 200 MG/1
200 CAPSULE ORAL
Qty: 0 | Refills: 0 | Status: DISCONTINUED | OUTPATIENT
Start: 2018-05-25 | End: 2018-05-25

## 2018-05-24 RX ORDER — CEFAZOLIN SODIUM 1 G
2000 VIAL (EA) INJECTION
Qty: 0 | Refills: 0 | Status: COMPLETED | OUTPATIENT
Start: 2018-05-24 | End: 2018-05-24

## 2018-05-24 RX ORDER — ONDANSETRON 8 MG/1
4 TABLET, FILM COATED ORAL EVERY 4 HOURS
Qty: 0 | Refills: 0 | Status: DISCONTINUED | OUTPATIENT
Start: 2018-05-24 | End: 2018-05-25

## 2018-05-24 RX ADMIN — Medication 650 MILLIGRAM(S): at 17:26

## 2018-05-24 RX ADMIN — TRANEXAMIC ACID 220 MILLIGRAM(S): 100 INJECTION, SOLUTION INTRAVENOUS at 08:00

## 2018-05-24 RX ADMIN — POLYETHYLENE GLYCOL 3350 17 GRAM(S): 17 POWDER, FOR SOLUTION ORAL at 13:22

## 2018-05-24 RX ADMIN — Medication 100 MILLIGRAM(S): at 22:40

## 2018-05-24 RX ADMIN — OXYCODONE HYDROCHLORIDE 20 MILLIGRAM(S): 5 TABLET ORAL at 06:57

## 2018-05-24 RX ADMIN — Medication 2000 MILLIGRAM(S): at 17:27

## 2018-05-24 RX ADMIN — Medication 400 MILLIGRAM(S): at 08:00

## 2018-05-24 RX ADMIN — Medication 1 TABLET(S): at 13:22

## 2018-05-24 RX ADMIN — Medication 650 MILLIGRAM(S): at 18:00

## 2018-05-24 RX ADMIN — Medication 300 MILLIGRAM(S): at 07:27

## 2018-05-24 RX ADMIN — OXYCODONE HYDROCHLORIDE 10 MILLIGRAM(S): 5 TABLET ORAL at 16:20

## 2018-05-24 RX ADMIN — PANTOPRAZOLE SODIUM 40 MILLIGRAM(S): 20 TABLET, DELAYED RELEASE ORAL at 13:22

## 2018-05-24 RX ADMIN — OXYCODONE HYDROCHLORIDE 10 MILLIGRAM(S): 5 TABLET ORAL at 23:30

## 2018-05-24 RX ADMIN — OXYCODONE HYDROCHLORIDE 10 MILLIGRAM(S): 5 TABLET ORAL at 22:39

## 2018-05-24 RX ADMIN — OXYCODONE HYDROCHLORIDE 20 MILLIGRAM(S): 5 TABLET ORAL at 07:28

## 2018-05-24 RX ADMIN — Medication 400 MILLIGRAM(S): at 20:49

## 2018-05-24 RX ADMIN — Medication 100 MILLIGRAM(S): at 13:23

## 2018-05-24 RX ADMIN — Medication 325 MILLIGRAM(S): at 13:22

## 2018-05-24 RX ADMIN — Medication 1 MILLIGRAM(S): at 13:22

## 2018-05-24 RX ADMIN — Medication 1000 MILLIGRAM(S): at 21:04

## 2018-05-24 RX ADMIN — Medication 2000 MILLIGRAM(S): at 22:39

## 2018-05-24 RX ADMIN — OXYCODONE HYDROCHLORIDE 10 MILLIGRAM(S): 5 TABLET ORAL at 17:29

## 2018-05-24 RX ADMIN — FENTANYL CITRATE 25 MICROGRAM(S): 50 INJECTION INTRAVENOUS at 12:31

## 2018-05-24 RX ADMIN — ONDANSETRON 4 MILLIGRAM(S): 8 TABLET, FILM COATED ORAL at 12:31

## 2018-05-24 RX ADMIN — Medication 500 MILLIGRAM(S): at 17:26

## 2018-05-24 RX ADMIN — Medication 100 MILLIGRAM(S): at 08:04

## 2018-05-24 RX ADMIN — OXYCODONE HYDROCHLORIDE 10 MILLIGRAM(S): 5 TABLET ORAL at 15:46

## 2018-05-24 NOTE — DISCHARGE NOTE ADULT - PLAN OF CARE
Decrease Pain, Improve Ambulation and Activities of Daily Living The patient will be seen in the office in 2 weeks for wound check. Tape will be removed at that time. Patient may shower after post-op day #3. The dressing is to be removed on post-op day #9 (6/2/2018).  IF THE DRESSING BECOMES SOILED BEFORE THE REMOVAL DATE, CHANGE WITH A SIMILAR DRESSING. IF THE DRESSING BECOMES STAINED WITH DISCHARGE, CONTACT THE OFFICE FOR FURTHER DIRECTIONS. The patient will contact the office if the wound becomes red, has increasing pain, develops bleeding or discharge, an injury occurs, or has other concerns. The patient will continue PT consistent with total knee replacement. The patient will continue aspirin 162mg twice daily for 6 weeks for blood clot prevention. The patient will take OXYCODONE AND TYLENOL for pain control and titrate according to prescription and patient needs. The patient will take SENNA-S while taking oxycodone to prevent narcotic associated constipation.  Additionally, increase water intake (drink at least 8 glasses of water daily) and try adding fiber to the diet by eating fruits, vegetables and foods that are rich in grains. If constipation is experienced, contact the medical/primary care provider to discuss further treatment options. The patient is FULL weight bearing. Elevation of the lower leg is recommended to reduce swelling. The patient will be seen in the office in 2 weeks for wound check. Tape will be removed at that time. Patient may shower after post-op day #5. The dressing is to be removed on post-op day #9 (6/2/2018).  IF THE DRESSING BECOMES SOILED BEFORE THE REMOVAL DATE, CHANGE WITH A SIMILAR DRESSING. IF THE DRESSING BECOMES STAINED WITH DISCHARGE, CONTACT THE OFFICE FOR FURTHER DIRECTIONS. The patient will contact the office if the wound becomes red, has increasing pain, develops bleeding or discharge, an injury occurs, or has other concerns. The patient will continue PT consistent with total knee replacement. The patient will continue aspirin 162mg twice daily for 6 weeks for blood clot prevention. The patient will take OXYCODONE AND TYLENOL for pain control and titrate according to prescription and patient needs. The patient will take SENNA-S while taking oxycodone to prevent narcotic associated constipation.  Additionally, increase water intake (drink at least 8 glasses of water daily) and try adding fiber to the diet by eating fruits, vegetables and foods that are rich in grains. If constipation is experienced, contact the medical/primary care provider to discuss further treatment options. The patient is FULL weight bearing. Elevation of the lower leg is recommended to reduce swelling.

## 2018-05-24 NOTE — DISCHARGE NOTE ADULT - MEDICATION SUMMARY - MEDICATIONS TO TAKE
I will START or STAY ON the medications listed below when I get home from the hospital:    aspirin 81 mg oral tablet, chewable  -- 2 tab(s) by mouth 2 times a day for clot prevention  -- Indication: For clot prevention    celecoxib 200 mg oral capsule  -- 1 cap(s) by mouth 2 times a day  -- Indication: For Pain    HYDROmorphone 2 mg oral tablet  -- 1 tab(s) by mouth every 3 hours, As needed,moderate to  Severe Pain (7 - 10) MDD:six  -- Indication: For Pain    acetaminophen 325 mg oral tablet  -- 2 tab(s) by mouth every 6 hours  -- Indication: For Pain    Colace 2-in-1 50 mg-8.6 mg oral tablet  -- 2 tab(s) by mouth once a day (at bedtime)   -- Medication should be taken with plenty of water.    -- Indication: For constipation    Multi-Day Plus Minerals oral tablet  -- 1 tab(s) by mouth once a day  -- Indication: For Home med    Vitamin C plus Zinc oral tablet, disintegrating  -- 1 tab(s) by mouth once a day  -- Indication: For Home med I will START or STAY ON the medications listed below when I get home from the hospital:    aspirin 81 mg oral tablet, chewable  -- 2 tab(s) by mouth 2 times a day for clot prevention  -- Indication: For clot prevention    celecoxib 200 mg oral capsule  -- 1 cap(s) by mouth 2 times a day  -- Indication: For Pain    HYDROmorphone 2 mg oral tablet  -- 1 tab(s) by mouth every 3 hours, As needed,moderate to  Severe Pain (7 - 10) MDD:six  -- Indication: For Pain    acetaminophen 325 mg oral tablet  -- 2 tab(s) by mouth every 6 hours  -- Indication: For Pain    Colace 2-in-1 50 mg-8.6 mg oral tablet  -- 2 tab(s) by mouth once a day (at bedtime)   -- Medication should be taken with plenty of water.    -- Indication: For constipation    pantoprazole 40 mg oral delayed release tablet  -- 1 tab(s) by mouth once a day while on ASA  -- Indication: For PPI for gastric protection while on Aspirin    Multi-Day Plus Minerals oral tablet  -- 1 tab(s) by mouth once a day  -- Indication: For Home med    Vitamin C plus Zinc oral tablet, disintegrating  -- 1 tab(s) by mouth once a day  -- Indication: For Home med

## 2018-05-24 NOTE — PROGRESS NOTE ADULT - SUBJECTIVE AND OBJECTIVE BOX
PA - Note    Ortho Post Op Check    Name: DEIDRA MAGANA    MR #: [id number]    Procedure: Left Total Knee Arthroplasty  Surgeon: Deanne Harrington    Pt comfortable without complaints, pain controlled, found laying in bed, states that she had PT while in PACU and has also ambulated to the bathroom  Denies CP, SOB, N/V, numbness/tingling     General Exam:  Vital Signs Last 24 Hrs  T(C): --  T(F): --  HR: --  BP: --  BP(mean): --  RR: --  SpO2: --    General: Pt Alert and oriented, NAD, controlled pain.  Dressings C/D/I. No bleeding.  Pulses: 2+ dorsalis pedis pulse. Cap refill < 2 sec.  Sensation: Grossly intact to light touch without deficit.  Motor: + ROM of toe, + Dorsal/Plantar Flexion of foot    Post-op X-Ray:  EXAM:  KNEE-LEFT                          PROCEDURE DATE:  05/24/2018          INTERPRETATION:  Left knee. Postoperative AP and lateral views show a   total knee replacement in place. Alignment appears good. No bone   destruction or fracture is evident.    IMPRESSION: Left knee replacement.                SVETLANA LEE M.D., ATTENDING RADIOLOGIST  This document has been electronically signed. May 24 2018 10:47AM      A/P: 63yFemale POD#0 s/p Left Total Knee Arthroplasty  - Stable  - Pain Control  - DVT ppx: Aspirin, ACDs  - Post op abx: Ancef, Vancomycin  - Continue with PT Protocol for Total Knee Arthroplasty  - Weight bearing status: WBAT  - D/C Planning (Home)

## 2018-05-24 NOTE — CONSULT NOTE ADULT - PROBLEM SELECTOR RECOMMENDATION 4
patient takes Zantac PRN at [facility] , will recommend to take daily while on ASA BID

## 2018-05-24 NOTE — BRIEF OPERATIVE NOTE - PROCEDURE
<<-----Click on this checkbox to enter Procedure Total knee arthroplasty  05/24/2018    Active  [id number]

## 2018-05-24 NOTE — PHYSICAL THERAPY INITIAL EVALUATION ADULT - CRITERIA FOR SKILLED THERAPEUTIC INTERVENTIONS
anticipated discharge recommendation/impairments found/anticipated equipment needs at discharge/Home with home PT, RW

## 2018-05-24 NOTE — DISCHARGE NOTE ADULT - HOSPITAL COURSE
The patient underwent a LEFT TOTAL KNEE REPLACEMENT on 5/24/2018. The patient received antibiotics consistent with SCIP guidelines. The patient underwent the procedure and had no intra-operative complications. Post-operatively, the patient was seen by medicine and PT. The patient received ASPIRIN for DVTP. The patient received pain medications per orthopedic pain management protocol and the pain was appropriately controlled. The patient did not have any post-operative medical complications. The patient was discharged in stable condition.

## 2018-05-24 NOTE — CONSULT NOTE ADULT - SUBJECTIVE AND OBJECTIVE BOX
PMD : Mirta  Cardio :     Patient is a 63y old  Female who is s/p L TKA , POD #0    CC: L knee pain       HPI:  Pleasant 63 year old female presents with c/o bilateral knee pain for years. Left worse than right . Ambulates without assistance , takes meloxicam for pain with relief. Xrays done and bone on bone noted, pt is s/p L TKA .      PAST MEDICAL & SURGICAL HISTORY:  Sleep apnea: cpap  pressure at 7  Arthritis  Knee pain, bilateral: left worse  Rectal cancer: 2016 chemo radiation  Gastroesophageal reflux disease, esophagitis presence not specified  Vitamin D deficiency  Thrombocytopenia: while on chemo  Degenerative disc disease, lumbar  Lung nodule  History of trigger finger: right  H/O hiatal hernia  S/P cholecystectomy  History of  section: X2    .1983  Status post gastric banding: band removed and sleeve procedure done  FH: cholecystectomy  H/O umbilical hernia repair  History of partial colectomy  S/P left knee arthroscopy      Social History:  Tabacco - EX SMOKER , QUIT 38 YRS AGO   ETOH - socially   Illicit drug abuse - denies    FAMILY HISTORY:  Family history of hypertension (Mother)  Family history of Alzheimer's disease (Father)  Family history of diabetes mellitus (Father)      Allergies    No Known Allergies    Intolerances    HOME MEDICATIONS :     	Multi-Day Plus Minerals oral tablet: Last Dose Taken:  , 1 tab(s) orally once a day  · 	Vitamin C plus Zinc oral tablet, disintegrating: Last Dose Taken:  , 1 tab(s) orally once a day  · 	meloxicam 15 mg oral tablet: Last Dose Taken:  , 1 tab(s) orally once a day    REVIEW OF SYSTEMS:    CONSTITUTIONAL: No fever, weight loss, or fatigue  EYES: No eye pain, visual disturbances, or discharge  NECK: No pain or stiffness  RESPIRATORY: No cough, wheezing, chills or hemoptysis; No shortness of breath  CARDIOVASCULAR: No chest pain, palpitations, dizziness, or leg swelling  GASTROINTESTINAL: No abdominal or epigastric pain. No nausea, vomiting, or hematemesis; No diarrhea or constipation. No melena or hematochezia.  GENITOURINARY: No dysuria, frequency, hematuria, or incontinence  NEUROLOGICAL: No headaches, memory loss, loss of strength, numbness, or tremors  SKIN: No itching, burning, rashes, or lesions   LYMPH NODES: No enlarged glands  ENDOCRINE: No heat or cold intolerance; No hair loss  MUSCULOSKELETAL: L knee pain   PSYCHIATRIC: No depression, anxiety, mood swings, or difficulty sleeping  HEME/LYMPH: No easy bruising, or bleeding gums  ALLERGY AND IMMUNOLOGIC: No hives or eczema    MEDICATIONS  (STANDING):  acetaminophen   Tablet. 650 milliGRAM(s) Oral every 6 hours  acetaminophen  IVPB. 1000 milliGRAM(s) IV Intermittent <User Schedule>  ascorbic acid 500 milliGRAM(s) Oral two times a day  ceFAZolin  Injectable. 2000 milliGRAM(s) IV Push <User Schedule>  docusate sodium 100 milliGRAM(s) Oral three times a day  ferrous    sulfate 325 milliGRAM(s) Oral daily  folic acid 1 milliGRAM(s) Oral daily  lactated ringers. 1000 milliLiter(s) (150 mL/Hr) IV Continuous <Continuous>  multivitamin 1 Tablet(s) Oral daily  oxyCODONE  ER Tablet 10 milliGRAM(s) Oral every 12 hours  pantoprazole    Tablet 40 milliGRAM(s) Oral daily  polyethylene glycol 3350 17 Gram(s) Oral daily  vancomycin  IVPB 1500 milliGRAM(s) IV Intermittent <User Schedule>    MEDICATIONS  (PRN):  acetaminophen   Tablet 650 milliGRAM(s) Oral every 6 hours PRN For Temp over 38.3 C (100.94 F)  HYDROmorphone   Tablet 2 milliGRAM(s) Oral every 3 hours PRN Severe Pain (7 - 10)  HYDROmorphone  Injectable 0.5 milliGRAM(s) IV Push every 3 hours PRN break through pain control  ondansetron Injectable 4 milliGRAM(s) IV Push every 4 hours PRN Nausea and/or Vomiting  oxyCODONE    IR 5 milliGRAM(s) Oral every 3 hours PRN Mild Pain  oxyCODONE    IR 10 milliGRAM(s) Oral every 3 hours PRN Moderate Pain  senna 2 Tablet(s) Oral at bedtime PRN Constipation      Vital Signs Last 24 Hrs  T(C): 36.4 (24 May 2018 09:38), Max: 36.5 (24 May 2018 06:37)  T(F): 97.5 (24 May 2018 09:38), Max: 97.7 (24 May 2018 06:37)  HR: 66 (24 May 2018 12:00) (56 - 67)  BP:  (24 May 2018 12:00) ( - 152/83)  BP(mean): --  RR: 18 (24 May 2018 12:00) (10 - 18)  SpO2: 100% (24 May 2018 12:00) (96% - 100%)    PHYSICAL EXAM:    GENERAL: NAD, well-groomed, well-developed  HEAD:  Atraumatic, Normocephalic  EYES: EOMI, PERRLA, conjunctiva and sclera clear  NECK: Supple, No JVD, Normal thyroid  NERVOUS SYSTEM:  Alert & Oriented X3,  CHEST/LUNG: CTA  b/l,  no rales, rhonchi, wheezing, or rubs  HEART: Regular rate and rhythm; No murmurs, rubs, or gallops  ABDOMEN: Soft, Nontender, Nondistended; Bowel sounds present  EXTREMITIES:  2+ Peripheral Pulses, No clubbing, cyanosis, or edema ,   LYMPH: No lymphadenopathy noted  SKIN: No rashes or lesions    LABS: Pending     RADIOLOGY & ADDITIONAL STUDIES:    < from: Xray Knee 1 or 2 Views, Left (18 @ 10:27) >     EXAM:  KNEE-LEFT                          PROCEDURE DATE:  2018          INTERPRETATION:  Left knee. Postoperative AP and lateral views show a   total knee replacement in place. Alignment appears good. No bone   destruction or fracture is evident.    IMPRESSION: Left knee replacement.          SVETLANA LEE M.D., ATTENDING RADIOLOGIST  This document has been electronically signed. May 24 2018 10:47AM    < end of copied text > PMD : Mirta Colin ; H[facility]ctor    Patient is a 63y old  Female who is s/p L TKA , POD #0    CC:b/l knee pain , now s/p L TKA       HPI:  Pleasant 63 year old female presents with c/o bilateral knee pain for years. Left worse than right . Ambulates without assistance , takes meloxicam for pain with relief.  Had gel / cortisone inj in the past . Xrays done and bone on bone noted, pt is s/p L TKA .      PAST MEDICAL & SURGICAL HISTORY:  Sleep apnea: cpap  pressure at 7  Arthritis  Knee pain, bilateral: left worse  Rectal cancer: 2016 chemo radiation  Gastroesophageal reflux disease, esophagitis presence not specified  Vitamin D deficiency  Thrombocytopenia: while on chemo  Degenerative disc disease, lumbar  Lung nodule  History of trigger finger: right  H/O hiatal hernia  S/P cholecystectomy  History of  section: X2    .1983  Status post gastric banding: band removed and sleeve procedure done  FH: cholecystectomy  H/O umbilical hernia repair  History of partial colectomy  S/P left knee arthroscopy      Social History:  Tabacco - EX SMOKER , QUIT 38 YRS AGO   ETOH - socially   Illicit drug abuse - denies    FAMILY HISTORY:  Family history of hypertension (Mother)  Family history of Alzheimer's disease (Father)  Family history of diabetes mellitus (Father)      Allergies    No Known Allergies    Intolerances    HOME MEDICATIONS :     	Multi-Day Plus Minerals oral tablet: Last Dose Taken:  , 1 tab(s) orally once a day  · 	Vitamin C plus Zinc oral tablet, disintegrating: Last Dose Taken:  , 1 tab(s) orally once a day  · 	meloxicam 15 mg oral tablet: Last Dose Taken:  , 1 tab(s) orally once a day    REVIEW OF SYSTEMS:    CONSTITUTIONAL: No fever, weight loss, or fatigue  EYES: No eye pain, visual disturbances, or discharge  NECK: No pain or stiffness  RESPIRATORY: No cough, wheezing, chills or hemoptysis; No shortness of breath  CARDIOVASCULAR: No chest pain, palpitations, dizziness, or leg swelling  GASTROINTESTINAL: No abdominal or epigastric pain. No nausea, vomiting, or hematemesis; No diarrhea or constipation. No melena or hematochezia.  GENITOURINARY: No dysuria, frequency, hematuria, or incontinence  NEUROLOGICAL: No headaches, memory loss, loss of strength, numbness, or tremors  SKIN: No itching, burning, rashes, or lesions   LYMPH NODES: No enlarged glands  ENDOCRINE: No heat or cold intolerance; No hair loss  MUSCULOSKELETAL: b/l knee chronic pain   PSYCHIATRIC: No depression, anxiety, mood swings, or difficulty sleeping  HEME/LYMPH: No easy bruising, or bleeding gums  ALLERGY AND IMMUNOLOGIC: No hives or eczema    MEDICATIONS  (STANDING):  acetaminophen   Tablet. 650 milliGRAM(s) Oral every 6 hours  acetaminophen  IVPB. 1000 milliGRAM(s) IV Intermittent <User Schedule>  ascorbic acid 500 milliGRAM(s) Oral two times a day  ceFAZolin  Injectable. 2000 milliGRAM(s) IV Push <User Schedule>  docusate sodium 100 milliGRAM(s) Oral three times a day  ferrous    sulfate 325 milliGRAM(s) Oral daily  folic acid 1 milliGRAM(s) Oral daily  lactated ringers. 1000 milliLiter(s) (150 mL/Hr) IV Continuous <Continuous>  multivitamin 1 Tablet(s) Oral daily  oxyCODONE  ER Tablet 10 milliGRAM(s) Oral every 12 hours  pantoprazole    Tablet 40 milliGRAM(s) Oral daily  polyethylene glycol 3350 17 Gram(s) Oral daily  vancomycin  IVPB 1500 milliGRAM(s) IV Intermittent <User Schedule>    MEDICATIONS  (PRN):  acetaminophen   Tablet 650 milliGRAM(s) Oral every 6 hours PRN For Temp over 38.3 C (100.94 F)  HYDROmorphone   Tablet 2 milliGRAM(s) Oral every 3 hours PRN Severe Pain (7 - 10)  HYDROmorphone  Injectable 0.5 milliGRAM(s) IV Push every 3 hours PRN break through pain control  ondansetron Injectable 4 milliGRAM(s) IV Push every 4 hours PRN Nausea and/or Vomiting  oxyCODONE    IR 5 milliGRAM(s) Oral every 3 hours PRN Mild Pain  oxyCODONE    IR 10 milliGRAM(s) Oral every 3 hours PRN Moderate Pain  senna 2 Tablet(s) Oral at bedtime PRN Constipation      Vital Signs Last 24 Hrs  T(C): 36.4 (24 May 2018 09:38), Max: 36.5 (24 May 2018 06:37)  T(F): 97.5 (24 May 2018 09:38), Max: 97.7 (24 May 2018 06:37)  HR: 66 (24 May 2018 12:00) (56 - 67)  BP: / (24 May 2018 12:00) (9/64 - 152/83)  BP(mean): --  RR: 18 (24 May 2018 12:00) (10 - 18)  SpO2: 100% (24 May 2018 12:00) (96% - 100%)    PHYSICAL EXAM:    GENERAL: NAD, well-groomed, well-developed  HEAD:  Atraumatic, Normocephalic  EYES: EOMI, PERRLA, conjunctiva and sclera clear  NECK: Supple, No JVD, Normal thyroid  NERVOUS SYSTEM:  Alert & Oriented X3,  CHEST/LUNG: CTA  b/l,  no rales, rhonchi, wheezing, or rubs  HEART: Regular rate and rhythm; No murmurs, rubs, or gallops  ABDOMEN: Soft, Nontender, Nondistended; Bowel sounds present  EXTREMITIES:  2+ Peripheral Pulses, No clubbing, cyanosis, or edema , L knee Hemovac + , ACE + , clean and dry   LYMPH: No lymphadenopathy noted  SKIN: No rashes or lesions    LABS: Pending     RADIOLOGY & ADDITIONAL STUDIES:    < from: Xray Knee 1 or 2 Views, Left (18 @ 10:27) >     EXAM:  KNEE-LEFT                          PROCEDURE DATE:  2018          INTERPRETATION:  Left knee. Postoperative AP and lateral views show a   total knee replacement in place. Alignment appears good. No bone   destruction or fracture is evident.    IMPRESSION: Left knee replacement.          SVETLANA LEE M.D., ATTENDING RADIOLOGIST  This document has been electronically signed. May 24 2018 10:47AM    < end of copied text >

## 2018-05-24 NOTE — DISCHARGE NOTE ADULT - PATIENT PORTAL LINK FT
You can access the [facility] Patient Portal, offered by [facility], by registering with the following website: [email or web address]

## 2018-05-24 NOTE — DISCHARGE NOTE ADULT - CARE PLAN
Principal Discharge DX:	Arthritis  Goal:	Decrease Pain, Improve Ambulation and Activities of Daily Living  Assessment and plan of treatment:	The patient will be seen in the office in 2 weeks for wound check. Tape will be removed at that time. Patient may shower after post-op day #3. The dressing is to be removed on post-op day #9 (6/2/2018).  IF THE DRESSING BECOMES SOILED BEFORE THE REMOVAL DATE, CHANGE WITH A SIMILAR DRESSING. IF THE DRESSING BECOMES STAINED WITH DISCHARGE, CONTACT THE OFFICE FOR FURTHER DIRECTIONS. The patient will contact the office if the wound becomes red, has increasing pain, develops bleeding or discharge, an injury occurs, or has other concerns. The patient will continue PT consistent with total knee replacement. The patient will continue aspirin 162mg twice daily for 6 weeks for blood clot prevention. The patient will take OXYCODONE AND TYLENOL for pain control and titrate according to prescription and patient needs. The patient will take SENNA-S while taking oxycodone to prevent narcotic associated constipation.  Additionally, increase water intake (drink at least 8 glasses of water daily) and try adding fiber to the diet by eating fruits, vegetables and foods that are rich in grains. If constipation is experienced, contact the medical/primary care provider to discuss further treatment options. The patient is FULL weight bearing. Elevation of the lower leg is recommended to reduce swelling. Principal Discharge DX:	Arthritis  Goal:	Decrease Pain, Improve Ambulation and Activities of Daily Living  Assessment and plan of treatment:	The patient will be seen in the office in 2 weeks for wound check. Tape will be removed at that time. Patient may shower after post-op day #5. The dressing is to be removed on post-op day #9 (6/2/2018).  IF THE DRESSING BECOMES SOILED BEFORE THE REMOVAL DATE, CHANGE WITH A SIMILAR DRESSING. IF THE DRESSING BECOMES STAINED WITH DISCHARGE, CONTACT THE OFFICE FOR FURTHER DIRECTIONS. The patient will contact the office if the wound becomes red, has increasing pain, develops bleeding or discharge, an injury occurs, or has other concerns. The patient will continue PT consistent with total knee replacement. The patient will continue aspirin 162mg twice daily for 6 weeks for blood clot prevention. The patient will take OXYCODONE AND TYLENOL for pain control and titrate according to prescription and patient needs. The patient will take SENNA-S while taking oxycodone to prevent narcotic associated constipation.  Additionally, increase water intake (drink at least 8 glasses of water daily) and try adding fiber to the diet by eating fruits, vegetables and foods that are rich in grains. If constipation is experienced, contact the medical/primary care provider to discuss further treatment options. The patient is FULL weight bearing. Elevation of the lower leg is recommended to reduce swelling.

## 2018-05-24 NOTE — CONSULT NOTE ADULT - ASSESSMENT
Pleasant 63 year old female presents with c/o bilateral knee pain for years. Left worse than right . Ambulates without assistance , takes meloxicam for pain with relief. Xrays done and bone on bone noted, pt is s/p L TKA .

## 2018-05-24 NOTE — PHYSICAL THERAPY INITIAL EVALUATION ADULT - ADDITIONAL COMMENTS
Pt lives in a private home with her daughter. 4 platform steps to enter without handrails, no steps inside. Pt was independent PTA without assist device. Pt does not own DME.

## 2018-05-24 NOTE — PHYSICAL THERAPY INITIAL EVALUATION ADULT - GENERAL OBSERVATIONS, REHAB EVAL
Pt received supine in bed + telemetry + IV + Venous Compression Boots + hemovac, no c/o pain, pt agreeable to PT

## 2018-05-24 NOTE — PHYSICAL THERAPY INITIAL EVALUATION ADULT - RANGE OF MOTION EXAMINATION, REHAB EVAL
bilateral upper extremity ROM was WFL (within functional limits)/Right LE ROM was WFL (within functional limits)/left knee lacking approx 5-10 degrees extension to approx 45-50 degrees flexion

## 2018-05-24 NOTE — DISCHARGE NOTE ADULT - CARE PROVIDER_API CALL
Deanne Harrington), Orthopedics  [address] [place] [address]  Phone: [phone]  Fax: [phone]

## 2018-05-24 NOTE — DISCHARGE NOTE ADULT - NS AS ACTIVITY OBS
Do not make important decisions/Do not drive or operate machinery/Stairs allowed/No Heavy lifting/straining/Walking-Outdoors allowed/Showering allowed/Walking-Indoors allowed

## 2018-05-25 VITALS
DIASTOLIC BLOOD PRESSURE: 79 MMHG | RESPIRATION RATE: 18 BRPM | TEMPERATURE: 98 F | HEART RATE: 79 BPM | OXYGEN SATURATION: 99 % | SYSTOLIC BLOOD PRESSURE: 120 MMHG

## 2018-05-25 LAB
ANION GAP SERPL CALC-SCNC: 12 MMOL/L — SIGNIFICANT CHANGE UP (ref 5–17)
BUN SERPL-MCNC: 13 MG/DL — SIGNIFICANT CHANGE UP (ref 8–20)
CALCIUM SERPL-MCNC: 8.5 MG/DL — LOW (ref 8.6–10.2)
CHLORIDE SERPL-SCNC: 105 MMOL/L — SIGNIFICANT CHANGE UP (ref 98–107)
CO2 SERPL-SCNC: 23 MMOL/L — SIGNIFICANT CHANGE UP (ref 22–29)
CREAT SERPL-MCNC: 0.58 MG/DL — SIGNIFICANT CHANGE UP (ref 0.5–1.3)
GLUCOSE SERPL-MCNC: 114 MG/DL — SIGNIFICANT CHANGE UP (ref 70–115)
HCT VFR BLD CALC: 32.9 % — LOW (ref 37–47)
HGB BLD-MCNC: 10.9 G/DL — LOW (ref 12–16)
MCHC RBC-ENTMCNC: 28.5 PG — SIGNIFICANT CHANGE UP (ref 27–31)
MCHC RBC-ENTMCNC: 33.1 G/DL — SIGNIFICANT CHANGE UP (ref 32–36)
MCV RBC AUTO: 86.1 FL — SIGNIFICANT CHANGE UP (ref 81–99)
PLATELET # BLD AUTO: 110 K/UL — LOW (ref 150–400)
POTASSIUM SERPL-MCNC: 4.1 MMOL/L — SIGNIFICANT CHANGE UP (ref 3.5–5.3)
POTASSIUM SERPL-SCNC: 4.1 MMOL/L — SIGNIFICANT CHANGE UP (ref 3.5–5.3)
RBC # BLD: 3.82 M/UL — LOW (ref 4.4–5.2)
RBC # FLD: 13.1 % — SIGNIFICANT CHANGE UP (ref 11–15.6)
SODIUM SERPL-SCNC: 140 MMOL/L — SIGNIFICANT CHANGE UP (ref 135–145)
WBC # BLD: 5.2 K/UL — SIGNIFICANT CHANGE UP (ref 4.8–10.8)
WBC # FLD AUTO: 5.2 K/UL — SIGNIFICANT CHANGE UP (ref 4.8–10.8)

## 2018-05-25 PROCEDURE — 97116 GAIT TRAINING THERAPY: CPT

## 2018-05-25 PROCEDURE — C1776: CPT

## 2018-05-25 PROCEDURE — 85027 COMPLETE CBC AUTOMATED: CPT

## 2018-05-25 PROCEDURE — 88305 TISSUE EXAM BY PATHOLOGIST: CPT

## 2018-05-25 PROCEDURE — 36415 COLL VENOUS BLD VENIPUNCTURE: CPT

## 2018-05-25 PROCEDURE — 97163 PT EVAL HIGH COMPLEX 45 MIN: CPT

## 2018-05-25 PROCEDURE — 88311 DECALCIFY TISSUE: CPT

## 2018-05-25 PROCEDURE — 80048 BASIC METABOLIC PNL TOTAL CA: CPT

## 2018-05-25 PROCEDURE — C1713: CPT

## 2018-05-25 PROCEDURE — 99232 SBSQ HOSP IP/OBS MODERATE 35: CPT

## 2018-05-25 PROCEDURE — 97110 THERAPEUTIC EXERCISES: CPT

## 2018-05-25 PROCEDURE — 73560 X-RAY EXAM OF KNEE 1 OR 2: CPT

## 2018-05-25 PROCEDURE — 94762 N-INVAS EAR/PLS OXIMTRY CONT: CPT

## 2018-05-25 PROCEDURE — 97167 OT EVAL HIGH COMPLEX 60 MIN: CPT

## 2018-05-25 RX ORDER — HYDROMORPHONE HYDROCHLORIDE 2 MG/ML
1 INJECTION INTRAMUSCULAR; INTRAVENOUS; SUBCUTANEOUS
Qty: 40 | Refills: 0
Start: 2018-05-25

## 2018-05-25 RX ORDER — ACETAMINOPHEN 500 MG
2 TABLET ORAL
Qty: 0 | Refills: 0 | DISCHARGE
Start: 2018-05-25

## 2018-05-25 RX ORDER — SENNOSIDES/DOCUSATE SODIUM 8.6MG-50MG
2 TABLET ORAL
Qty: 28 | Refills: 0
Start: 2018-05-25 | End: 2018-06-07

## 2018-05-25 RX ORDER — CELECOXIB 200 MG/1
1 CAPSULE ORAL
Qty: 20 | Refills: 0
Start: 2018-05-25 | End: 2018-06-03

## 2018-05-25 RX ORDER — ASPIRIN/CALCIUM CARB/MAGNESIUM 324 MG
2 TABLET ORAL
Qty: 168 | Refills: 0
Start: 2018-05-25 | End: 2018-07-05

## 2018-05-25 RX ORDER — MELOXICAM 15 MG/1
1 TABLET ORAL
Qty: 0 | Refills: 0 | COMMUNITY

## 2018-05-25 RX ORDER — PANTOPRAZOLE SODIUM 20 MG/1
1 TABLET, DELAYED RELEASE ORAL
Qty: 40 | Refills: 0
Start: 2018-05-25

## 2018-05-25 RX ADMIN — PANTOPRAZOLE SODIUM 40 MILLIGRAM(S): 20 TABLET, DELAYED RELEASE ORAL at 11:53

## 2018-05-25 RX ADMIN — Medication 650 MILLIGRAM(S): at 00:29

## 2018-05-25 RX ADMIN — Medication 300 MILLIGRAM(S): at 00:28

## 2018-05-25 RX ADMIN — Medication 100 MILLIGRAM(S): at 05:17

## 2018-05-25 RX ADMIN — Medication 650 MILLIGRAM(S): at 12:30

## 2018-05-25 RX ADMIN — HYDROMORPHONE HYDROCHLORIDE 2 MILLIGRAM(S): 2 INJECTION INTRAMUSCULAR; INTRAVENOUS; SUBCUTANEOUS at 11:54

## 2018-05-25 RX ADMIN — HYDROMORPHONE HYDROCHLORIDE 2 MILLIGRAM(S): 2 INJECTION INTRAMUSCULAR; INTRAVENOUS; SUBCUTANEOUS at 15:55

## 2018-05-25 RX ADMIN — CELECOXIB 200 MILLIGRAM(S): 200 CAPSULE ORAL at 05:17

## 2018-05-25 RX ADMIN — Medication 650 MILLIGRAM(S): at 11:51

## 2018-05-25 RX ADMIN — HYDROMORPHONE HYDROCHLORIDE 2 MILLIGRAM(S): 2 INJECTION INTRAMUSCULAR; INTRAVENOUS; SUBCUTANEOUS at 05:16

## 2018-05-25 RX ADMIN — Medication 650 MILLIGRAM(S): at 05:16

## 2018-05-25 RX ADMIN — Medication 100 MILLIGRAM(S): at 11:51

## 2018-05-25 RX ADMIN — Medication 1 TABLET(S): at 11:51

## 2018-05-25 RX ADMIN — HYDROMORPHONE HYDROCHLORIDE 2 MILLIGRAM(S): 2 INJECTION INTRAMUSCULAR; INTRAVENOUS; SUBCUTANEOUS at 08:55

## 2018-05-25 RX ADMIN — HYDROMORPHONE HYDROCHLORIDE 0.5 MILLIGRAM(S): 2 INJECTION INTRAMUSCULAR; INTRAVENOUS; SUBCUTANEOUS at 00:46

## 2018-05-25 RX ADMIN — Medication 325 MILLIGRAM(S): at 11:53

## 2018-05-25 RX ADMIN — HYDROMORPHONE HYDROCHLORIDE 0.5 MILLIGRAM(S): 2 INJECTION INTRAMUSCULAR; INTRAVENOUS; SUBCUTANEOUS at 00:29

## 2018-05-25 RX ADMIN — OXYCODONE HYDROCHLORIDE 10 MILLIGRAM(S): 5 TABLET ORAL at 05:16

## 2018-05-25 RX ADMIN — Medication 1 MILLIGRAM(S): at 11:53

## 2018-05-25 RX ADMIN — Medication 500 MILLIGRAM(S): at 05:17

## 2018-05-25 RX ADMIN — HYDROMORPHONE HYDROCHLORIDE 2 MILLIGRAM(S): 2 INJECTION INTRAMUSCULAR; INTRAVENOUS; SUBCUTANEOUS at 12:30

## 2018-05-25 RX ADMIN — HYDROMORPHONE HYDROCHLORIDE 2 MILLIGRAM(S): 2 INJECTION INTRAMUSCULAR; INTRAVENOUS; SUBCUTANEOUS at 15:21

## 2018-05-25 RX ADMIN — Medication 650 MILLIGRAM(S): at 01:22

## 2018-05-25 RX ADMIN — POLYETHYLENE GLYCOL 3350 17 GRAM(S): 17 POWDER, FOR SOLUTION ORAL at 11:51

## 2018-05-25 RX ADMIN — Medication 162 MILLIGRAM(S): at 05:16

## 2018-05-25 RX ADMIN — HYDROMORPHONE HYDROCHLORIDE 2 MILLIGRAM(S): 2 INJECTION INTRAMUSCULAR; INTRAVENOUS; SUBCUTANEOUS at 08:21

## 2018-05-25 NOTE — OCCUPATIONAL THERAPY INITIAL EVALUATION ADULT - ADDITIONAL COMMENTS
Pt lives in private home with 4 DERECK and 12 steps down to salon in basement. Bathroom and bedroom are on main level. Bathroom has tub with curtains. Pt does not own any DME. Pt is right handed. Pt drives. Pt's daughters work however they are supportive and live local; youngest daughter will be staying with pt for a week upon discharge.

## 2018-05-25 NOTE — OCCUPATIONAL THERAPY INITIAL EVALUATION ADULT - ASSISTIVE DEVICE FOR TOILET TRANSFER, REHAB EVAL
pt utilizes handicap height toilet with grab bar safely in bathroom however recommend use of commode over toilet upon discharge for height and armrests; pt in agreement to use commode over toilet at home/rolling walker/grab bars

## 2018-05-25 NOTE — OCCUPATIONAL THERAPY INITIAL EVALUATION ADULT - REHAB POTENTIAL, OT EVAL
pt modified independent with ADLs, transfers; pt not put on program for OT/good, to achieve stated therapy goals

## 2018-05-25 NOTE — PROGRESS NOTE ADULT - ASSESSMENT
Pleasant 63 year old female presents with c/o bilateral knee pain for years. Left worse than right . Ambulates without assistance , takes meloxicam for pain with relief. Xrays done and bone on bone noted, pt is s/p L TKA  , POD # 1 .     Problem/Recommendation - 1:  Problem: Primary osteoarthritis of both knees. Recommendation: S/P L TKA .     Problem/Recommendation - 2:  ·  Problem: Status post left knee replacement.  Recommendation: PT/OT/pain mgmt  DVT prophylaxis- as per ortho  Abx as per SCIP  Incentive spirometry  Prophylaxis of opioid  induced constipation.      Problem/Recommendation - 3:  ·  Problem: Sleep apnea, unspecified type.  Recommendation: may use own CPAP , nocturnal .      Problem/Recommendation - 4:  ·  Problem: Gastroesophageal reflux disease, esophagitis presence not specified.  Recommendation: patient takes Zantac PRN at home, will recommend to take daily while on ASA BID.      Problem/Recommendation - 5:  ·  Problem: History of rectal cancer.  Recommendation: s/p radiation / chemo- in remission x 2 yrs.     Problem/Recommendation - 6:  Problem: History of thrombocytopenia. Recommendation: post radiation / chemo . Today's plt noted - will folow in am      Problem/Recommendation - 7:  Problem: Lung nodule. Recommendation: - does follow up with Pulm as outpatient - stable.     Problem/Recommendation - 8:  Problem: Need for prophylactic measure. Recommendation: DVT - prophylaxis - as per ortho protocol - on ASA BID      Problem/Recommendation - 9:  Problem: Anemia - likely ABLA  as a cause of postop anemia . Recommendation: Pleasant 63 year old female presents with c/o bilateral knee pain for years. Left worse than right . Ambulates without assistance , takes meloxicam for pain with relief. Xrays done and bone on bone noted, pt is s/p L TKA  , POD # 1 .     Problem/Recommendation - 1:  Problem: Primary osteoarthritis of both knees. Recommendation: S/P L TKA .     Problem/Recommendation - 2:  ·  Problem: Status post left knee replacement.  Recommendation: PT/OT/pain mgmt  DVT prophylaxis- as per ortho  Abx as per SCIP  Incentive spirometry  Prophylaxis of opioid  induced constipation.      Problem/Recommendation - 3:  ·  Problem: Sleep apnea, unspecified type.  Recommendation: may use own CPAP , nocturnal .      Problem/Recommendation - 4:  ·  Problem: Gastroesophageal reflux disease, esophagitis presence not specified.  Recommendation: patient takes Zantac PRN at home, will recommend to take PPI while on ASA BID.      Problem/Recommendation - 5:  ·  Problem: History of rectal cancer.  Recommendation: s/p radiation / chemo- in remission x 2 yrs.     Problem/Recommendation - 6:  Problem: History of thrombocytopenia. Recommendation: post radiation / chemo . Today's plt noted - will folow in am      Problem/Recommendation - 7:  Problem: Lung nodule. Recommendation: - does follow up with Pulm as outpatient - stable.     Problem/Recommendation - 8:  Problem: Need for prophylactic measure. Recommendation: DVT - prophylaxis - as per ortho protocol - on ASA BID      Problem/Recommendation - 9:  Problem: Anemia - likely ABLA  as a cause of postop anemia . Recommendation: asymptomatic , follow CBC in am .

## 2018-05-25 NOTE — OCCUPATIONAL THERAPY INITIAL EVALUATION ADULT - GENERAL OBSERVATIONS, REHAB EVAL
Received pt without complaints, +IV lock, +hemovac, pulse ox disconnected for eval as per RN, daughter present; pt agrees to OT

## 2018-05-25 NOTE — PROGRESS NOTE ADULT - SUBJECTIVE AND OBJECTIVE BOX
Patient seen and examined . S/p  L TKA  , POD # 1    CC : L knee pain       PAST MEDICAL & SURGICAL HISTORY:  Sleep apnea: cpap  pressure at 7  Arthritis  Knee pain, bilateral: left worse  Rectal cancer: 2016 chemo radiation  Gastroesophageal reflux disease, esophagitis presence not specified  Vitamin D deficiency  Thrombocytopenia: while on chemo  Degenerative disc disease, lumbar  Lung nodule  History of trigger finger: right  H/O hiatal hernia  S/P cholecystectomy  History of  section: X2    .1983  Status post gastric banding: band removed and sleeve procedure done  FH: cholecystectomy  H/O umbilical hernia repair  History of partial colectomy  S/P left knee arthroscopy      MEDICATIONS  (STANDING):  acetaminophen   Tablet. 650 milliGRAM(s) Oral every 6 hours  ascorbic acid 500 milliGRAM(s) Oral two times a day  aspirin  chewable 162 milliGRAM(s) Oral two times a day  celecoxib 200 milliGRAM(s) Oral two times a day  docusate sodium 100 milliGRAM(s) Oral three times a day  ferrous    sulfate 325 milliGRAM(s) Oral daily  folic acid 1 milliGRAM(s) Oral daily  lactated ringers. 1000 milliLiter(s) (150 mL/Hr) IV Continuous <Continuous>  multivitamin 1 Tablet(s) Oral daily  oxyCODONE  ER Tablet 10 milliGRAM(s) Oral every 12 hours  pantoprazole    Tablet 40 milliGRAM(s) Oral daily  polyethylene glycol 3350 17 Gram(s) Oral daily    MEDICATIONS  (PRN):  acetaminophen   Tablet 650 milliGRAM(s) Oral every 6 hours PRN For Temp over 38.3 C (100.94 F)  HYDROmorphone   Tablet 2 milliGRAM(s) Oral every 3 hours PRN Severe Pain (7 - 10)  HYDROmorphone  Injectable 0.5 milliGRAM(s) IV Push every 3 hours PRN break through pain control  ondansetron Injectable 4 milliGRAM(s) IV Push every 4 hours PRN Nausea and/or Vomiting  oxyCODONE    IR 5 milliGRAM(s) Oral every 3 hours PRN Mild Pain  oxyCODONE    IR 10 milliGRAM(s) Oral every 3 hours PRN Moderate Pain  senna 2 Tablet(s) Oral at bedtime PRN Constipation      LABS:                          10.9   5.2   )-----------( 110      ( 25 May 2018 07:11 )             32.9     05-25    140  |  105  |  13.0  ----------------------------<  114  4.1   |  23.0  |  0.58    Ca    8.5<L>      25 May 2018 07:11      RADIOLOGY & ADDITIONAL TESTS:    < from: Xray Knee 1 or 2 Views, Left (18 @ 10:27) >     EXAM:  KNEE-LEFT                          PROCEDURE DATE:  2018          INTERPRETATION:  Left knee. Postoperative AP and lateral views show a   total knee replacement in place. Alignment appears good. No bone   destruction or fracture is evident.    IMPRESSION: Left knee replacement.                SVETLANA LEE M.D., ATTENDING RADIOLOGIST  This document has been electronically signed. May 24 2018 10:47AM        < end of copied text >        REVIEW OF SYSTEMS:    CONSTITUTIONAL: No fever, weight loss, or fatigue  EYES: No eye pain, visual disturbances, or discharge  ENMT:  No difficulty hearing, tinnitus, vertigo; No sinus or throat pain  NECK: No pain or stiffness  RESPIRATORY: No cough, wheezing, chills or hemoptysis; No shortness of breath  CARDIOVASCULAR: No chest pain, palpitations, dizziness, or leg swelling  GASTROINTESTINAL: No abdominal or epigastric pain. No nausea, vomiting, or hematemesis; No diarrhea or constipation. No melena or hematochezia.  GENITOURINARY: No dysuria, frequency, hematuria, or incontinence  NEUROLOGICAL: No headaches, memory loss, loss of strength, numbness, or tremors  SKIN: No itching, burning, rashes, or lesions   LYMPH NODES: No enlarged glands  ENDOCRINE: No heat or cold intolerance; No hair loss  MUSCULOSKELETAL: L knee pain   PSYCHIATRIC: No depression, anxiety, mood swings, or difficulty sleeping  HEME/LYMPH: No easy bruising, or bleeding gums  ALLERGY AND IMMUNOLOGIC: No hives or eczema    Vital Signs Last 24 Hrs  T(C): 37.1 (25 May 2018 08:47), Max: 37.1 (25 May 2018 04:51)  T(F): 98.8 (25 May 2018 08:47), Max: 98.8 (25 May 2018 08:47)  HR: 73 (25 May 2018 08:47) (59 - 84)  BP: 117/70 (25 May 2018 08:47) (9/64 - 134/90)  BP(mean): --  RR: 18 (25 May 2018 08:47) (13 - 20)  SpO2: 96% (25 May 2018 08:47) (96% - 100%)  PHYSICAL EXAM:    GENERAL: NAD, well-groomed, well-developed  HEAD:  Atraumatic, Normocephalic  EYES: EOMI, PERRLA, conjunctiva and sclera clear  NECK: Supple, No JVD, Normal thyroid  NERVOUS SYSTEM:  Alert & Oriented X3, no focal deficit  CHEST/LUNG: CTA b/l ,  no  rales, rhonchi, wheezing, or rubs  HEART: Regular rate and rhythm; No murmurs, rubs, or gallops  ABDOMEN: Soft, Nontender, Nondistended; Bowel sounds present  EXTREMITIES:  2+ Peripheral Pulses, No clubbing, cyanosis, or edema , L knee dressing + , clen and dry   LYMPH: No lymphadenopathy noted  SKIN: No rashes or lesions Patient seen and examined . S/p  L TKA  , POD # 1 , pain well controlled , no other complaints .    CC : L knee pain       PAST MEDICAL & SURGICAL HISTORY:  Sleep apnea: cpap  pressure at 7  Arthritis  Knee pain, bilateral: left worse  Rectal cancer: 2016 chemo radiation  Gastroesophageal reflux disease, esophagitis presence not specified  Vitamin D deficiency  Thrombocytopenia: while on chemo  Degenerative disc disease, lumbar  Lung nodule  History of trigger finger: right  H/O hiatal hernia  S/P cholecystectomy  History of  section: X2    .1983  Status post gastric banding: band removed and sleeve procedure done  FH: cholecystectomy  H/O umbilical hernia repair  History of partial colectomy  S/P left knee arthroscopy      MEDICATIONS  (STANDING):  acetaminophen   Tablet. 650 milliGRAM(s) Oral every 6 hours  ascorbic acid 500 milliGRAM(s) Oral two times a day  aspirin  chewable 162 milliGRAM(s) Oral two times a day  celecoxib 200 milliGRAM(s) Oral two times a day  docusate sodium 100 milliGRAM(s) Oral three times a day  ferrous    sulfate 325 milliGRAM(s) Oral daily  folic acid 1 milliGRAM(s) Oral daily  lactated ringers. 1000 milliLiter(s) (150 mL/Hr) IV Continuous <Continuous>  multivitamin 1 Tablet(s) Oral daily  oxyCODONE  ER Tablet 10 milliGRAM(s) Oral every 12 hours  pantoprazole    Tablet 40 milliGRAM(s) Oral daily  polyethylene glycol 3350 17 Gram(s) Oral daily    MEDICATIONS  (PRN):  acetaminophen   Tablet 650 milliGRAM(s) Oral every 6 hours PRN For Temp over 38.3 C (100.94 F)  HYDROmorphone   Tablet 2 milliGRAM(s) Oral every 3 hours PRN Severe Pain (7 - 10)  HYDROmorphone  Injectable 0.5 milliGRAM(s) IV Push every 3 hours PRN break through pain control  ondansetron Injectable 4 milliGRAM(s) IV Push every 4 hours PRN Nausea and/or Vomiting  oxyCODONE    IR 5 milliGRAM(s) Oral every 3 hours PRN Mild Pain  oxyCODONE    IR 10 milliGRAM(s) Oral every 3 hours PRN Moderate Pain  senna 2 Tablet(s) Oral at bedtime PRN Constipation      LABS:                          10.9   5.2   )-----------( 110      ( 25 May 2018 07:11 )             32.9     05-25    140  |  105  |  13.0  ----------------------------<  114  4.1   |  23.0  |  0.58    Ca    8.5<L>      25 May 2018 07:11      RADIOLOGY & ADDITIONAL TESTS:    < from: Xray Knee 1 or 2 Views, Left (18 @ 10:27) >     EXAM:  KNEE-LEFT                          PROCEDURE DATE:  2018          INTERPRETATION:  Left knee. Postoperative AP and lateral views show a   total knee replacement in place. Alignment appears good. No bone   destruction or fracture is evident.    IMPRESSION: Left knee replacement.                SVETLANA LEE M.D., ATTENDING RADIOLOGIST  This document has been electronically signed. May 24 2018 10:47AM        < end of copied text >        REVIEW OF SYSTEMS:    CONSTITUTIONAL: No fever, weight loss, or fatigue  EYES: No eye pain, visual disturbances, or discharge  ENMT:  No difficulty hearing, tinnitus, vertigo; No sinus or throat pain  NECK: No pain or stiffness  RESPIRATORY: No cough, wheezing, chills or hemoptysis; No shortness of breath  CARDIOVASCULAR: No chest pain, palpitations, dizziness, or leg swelling  GASTROINTESTINAL: No abdominal or epigastric pain. No nausea, vomiting, or hematemesis; No diarrhea or constipation. No melena or hematochezia.  GENITOURINARY: No dysuria, frequency, hematuria, or incontinence  NEUROLOGICAL: No headaches, memory loss, loss of strength, numbness, or tremors  SKIN: No itching, burning, rashes, or lesions   LYMPH NODES: No enlarged glands  ENDOCRINE: No heat or cold intolerance; No hair loss  MUSCULOSKELETAL: L knee pain well controlled   PSYCHIATRIC: No depression, anxiety, mood swings, or difficulty sleeping  HEME/LYMPH: No easy bruising, or bleeding gums  ALLERGY AND IMMUNOLOGIC: No hives or eczema    Vital Signs Last 24 Hrs  T(C): 37.1 (25 May 2018 08:47), Max: 37.1 (25 May 2018 04:51)  T(F): 98.8 (25 May 2018 08:47), Max: 98.8 (25 May 2018 08:47)  HR: 73 (25 May 2018 08:47) (59 - 84)  BP: 117/70 (25 May 2018 08:47) (9/64 - 134/90)  BP(mean): --  RR: 18 (25 May 2018 08:47) (13 - 20)  SpO2: 96% (25 May 2018 08:47) (96% - 100%)  PHYSICAL EXAM:    GENERAL: NAD, well-groomed, well-developed  HEAD:  Atraumatic, Normocephalic  EYES: EOMI, PERRLA, conjunctiva and sclera clear  NECK: Supple, No JVD, Normal thyroid  NERVOUS SYSTEM:  Alert & Oriented X3, no focal deficit  CHEST/LUNG: CTA b/l ,  no  rales, rhonchi, wheezing, or rubs  HEART: Regular rate and rhythm; No murmurs, rubs, or gallops  ABDOMEN: Soft, Nontender, Nondistended; Bowel sounds present  EXTREMITIES:  2+ Peripheral Pulses, No clubbing, cyanosis, or edema , L knee ACE WRAP + , clean  and dry   LYMPH: No lymphadenopathy noted  SKIN: No rashes or lesions

## 2018-05-25 NOTE — PROGRESS NOTE ADULT - SUBJECTIVE AND OBJECTIVE BOX
Patient seen and examined sitting in chair, encountered for dressing change. and called for blood on dressing.    LLE: Ace bandage dressing with mild bloody staining near drain site. Dressing removed, drain holes on tubing visible, visible air pockets in line, drain not holding suction. Drain removed without complication. Surgical site dressing removed as well, prineo in tact. no erythema, no active drainage. New dressings applied.

## 2018-05-25 NOTE — PROGRESS NOTE ADULT - SUBJECTIVE AND OBJECTIVE BOX
POD #1 s/p IVAS/spinal/ Denies any back pain or numbness to LE's/site CDI. Pain controlled. No quest. or complications r/t yest anesthetic.

## 2018-05-25 NOTE — PROGRESS NOTE ADULT - SUBJECTIVE AND OBJECTIVE BOX
DEIDRA KWOKOSITO  [id number]    History: 63y Female is status post left total knee arthroplasty on 5/24/2018, POD # 1. Patient is doing well and is comfortable. The patient's pain is controlled using the prescribed pain medications. The patient is participating in physical therapy and was out of bed yesterday. Denies nausea, vomiting, chest pain, shortness of breath, abdominal pain or fever. No new complaints.          MEDICATIONS  (STANDING):  acetaminophen   Tablet. 650 milliGRAM(s) Oral every 6 hours  ascorbic acid 500 milliGRAM(s) Oral two times a day  aspirin  chewable 162 milliGRAM(s) Oral two times a day  celecoxib 200 milliGRAM(s) Oral two times a day  docusate sodium 100 milliGRAM(s) Oral three times a day  ferrous    sulfate 325 milliGRAM(s) Oral daily  folic acid 1 milliGRAM(s) Oral daily  lactated ringers. 1000 milliLiter(s) (150 mL/Hr) IV Continuous <Continuous>  multivitamin 1 Tablet(s) Oral daily  oxyCODONE  ER Tablet 10 milliGRAM(s) Oral every 12 hours  pantoprazole    Tablet 40 milliGRAM(s) Oral daily  polyethylene glycol 3350 17 Gram(s) Oral daily    MEDICATIONS  (PRN):  acetaminophen   Tablet 650 milliGRAM(s) Oral every 6 hours PRN For Temp over 38.3 C (100.94 F)  HYDROmorphone   Tablet 2 milliGRAM(s) Oral every 3 hours PRN Severe Pain (7 - 10)  HYDROmorphone  Injectable 0.5 milliGRAM(s) IV Push every 3 hours PRN break through pain control  ondansetron Injectable 4 milliGRAM(s) IV Push every 4 hours PRN Nausea and/or Vomiting  oxyCODONE    IR 5 milliGRAM(s) Oral every 3 hours PRN Mild Pain  oxyCODONE    IR 10 milliGRAM(s) Oral every 3 hours PRN Moderate Pain  senna 2 Tablet(s) Oral at bedtime PRN Constipation      Physical exam: The left knee dressing is clean, dry and intact. + drain noted. No drainage or discharge. No erythema is noted. No blistering. No ecchymosis. The calf is supple nontender. Passive range of motion is acceptable to due postoperative pain. No calf tenderness. Sensation to light touch is grossly intact distally. Motor function distally is 5/5. Extensor hallucis longus and flexor hallucis longus are intact. No foot drop. 2+ dorsalis pedis pulse. Capillary refill is less than 2 seconds. No cyanosis.    Primary Orthopedic Assessment:  • s/p LEFT total knee replacement    Secondary  Orthopedic Assessment(s):   •     Secondary  Medical Assessment(s):   Lung nodule: Lung nodule  History of thrombocytopenia: History of thrombocytopenia  History of rectal cancer: History of rectal cancer  Gastroesophageal reflux disease, esophagitis presence not specified: Gastroesophageal reflux disease, esophagitis presence not specified  Sleep apnea, unspecified type: Sleep apnea, unspecified type  Status post left knee replacement: Status post left knee replacement  Primary osteoarthritis of both knees: Primary osteoarthritis of both knees  Need for prophylactic measure: Need for prophylactic measure  Sleep apnea: Sleep apnea  Knee pain, bilateral: Knee pain, bilateral        Plan:   • DVT prophylaxis as prescribed, including use of compression devices and ankle pumps  • Continue physical therapy  • Weightbearing as tolerated of the left lower extremity with assistance of a walker  • Incentive spirometry encouraged  • Pain control as clinically indicated  • Discharge planning – anticipated discharge is Home Sat or Sunday DEIDRA PARADATO  [id number]    History: 63y Female is status post left total knee arthroplasty on 5/24/2018, POD # 1. Patient is doing well and is comfortable. The patient's pain is controlled using the prescribed pain medications. The patient is participating in physical therapy and was out of bed yesterday. Denies nausea, vomiting, chest pain, shortness of breath, abdominal pain or fever. No new complaints.        MEDICATIONS  (STANDING):  acetaminophen   Tablet. 650 milliGRAM(s) Oral every 6 hours  ascorbic acid 500 milliGRAM(s) Oral two times a day  aspirin  chewable 162 milliGRAM(s) Oral two times a day  celecoxib 200 milliGRAM(s) Oral two times a day  docusate sodium 100 milliGRAM(s) Oral three times a day  ferrous    sulfate 325 milliGRAM(s) Oral daily  folic acid 1 milliGRAM(s) Oral daily  lactated ringers. 1000 milliLiter(s) (150 mL/Hr) IV Continuous <Continuous>  multivitamin 1 Tablet(s) Oral daily  oxyCODONE  ER Tablet 10 milliGRAM(s) Oral every 12 hours  pantoprazole    Tablet 40 milliGRAM(s) Oral daily  polyethylene glycol 3350 17 Gram(s) Oral daily    MEDICATIONS  (PRN):  acetaminophen   Tablet 650 milliGRAM(s) Oral every 6 hours PRN For Temp over 38.3 C (100.94 F)  HYDROmorphone   Tablet 2 milliGRAM(s) Oral every 3 hours PRN Severe Pain (7 - 10)  HYDROmorphone  Injectable 0.5 milliGRAM(s) IV Push every 3 hours PRN break through pain control  ondansetron Injectable 4 milliGRAM(s) IV Push every 4 hours PRN Nausea and/or Vomiting  oxyCODONE    IR 5 milliGRAM(s) Oral every 3 hours PRN Mild Pain  oxyCODONE    IR 10 milliGRAM(s) Oral every 3 hours PRN Moderate Pain  senna 2 Tablet(s) Oral at bedtime PRN Constipation      I&O's Detail    24 May 2018 07:01  -  25 May 2018 07:00  --------------------------------------------------------  IN:    lactated ringers.: 1200 mL    Oral Fluid: 480 mL  Total IN: 1680 mL    OUT:    Accordian: 340 mL    Voided: 950 mL  Total OUT: 1290 mL    Total NET: 390 mL          Physical exam: The left knee dressing is clean, dry and intact. + drain noted. No drainage or discharge. No erythema is noted. No blistering. No ecchymosis. The calf is supple nontender. Passive range of motion is acceptable to due postoperative pain. No calf tenderness. Sensation to light touch is grossly intact distally. Motor function distally is 5/5. Extensor hallucis longus and flexor hallucis longus are intact. No foot drop. 2+ dorsalis pedis pulse. Capillary refill is less than 2 seconds. No cyanosis.    Primary Orthopedic Assessment:  • s/p LEFT total knee replacement    Secondary  Orthopedic Assessment(s):   •     Secondary  Medical Assessment(s):   Lung nodule: Lung nodule  History of thrombocytopenia: History of thrombocytopenia  History of rectal cancer: History of rectal cancer  Gastroesophageal reflux disease, esophagitis presence not specified: Gastroesophageal reflux disease, esophagitis presence not specified  Sleep apnea, unspecified type: Sleep apnea, unspecified type  Status post left knee replacement: Status post left knee replacement  Primary osteoarthritis of both knees: Primary osteoarthritis of both knees  Need for prophylactic measure: Need for prophylactic measure  Sleep apnea: Sleep apnea  Knee pain, bilateral: Knee pain, bilateral        Plan:   • DVT prophylaxis as prescribed, including use of compression devices and ankle pumps  • Continue physical therapy  • Weightbearing as tolerated of the left lower extremity with assistance of a walker  • Incentive spirometry encouraged  • Pain control as clinically indicated  • Discharge planning – anticipated discharge is Home Sat

## 2018-06-06 ENCOUNTER — CHART COPY (OUTPATIENT)
Age: 63
End: 2018-06-06

## 2018-06-07 LAB — SURGICAL PATHOLOGY FINAL REPORT - CH: SIGNIFICANT CHANGE UP

## 2018-06-08 ENCOUNTER — APPOINTMENT (OUTPATIENT)
Dept: ORTHOPEDIC SURGERY | Facility: CLINIC | Age: 63
End: 2018-06-08
Payer: COMMERCIAL

## 2018-06-08 VITALS
SYSTOLIC BLOOD PRESSURE: 135 MMHG | DIASTOLIC BLOOD PRESSURE: 86 MMHG | WEIGHT: 216 LBS | HEIGHT: 63 IN | HEART RATE: 84 BPM | BODY MASS INDEX: 38.27 KG/M2

## 2018-06-08 PROCEDURE — 99024 POSTOP FOLLOW-UP VISIT: CPT

## 2018-06-08 PROCEDURE — 73562 X-RAY EXAM OF KNEE 3: CPT | Mod: LT

## 2018-07-09 ENCOUNTER — RX RENEWAL (OUTPATIENT)
Age: 63
End: 2018-07-09

## 2018-07-16 PROBLEM — D69.6 THROMBOCYTOPENIA, UNSPECIFIED: Chronic | Status: ACTIVE | Noted: 2017-05-01

## 2018-07-16 PROBLEM — C20 MALIGNANT NEOPLASM OF RECTUM: Chronic | Status: ACTIVE | Noted: 2017-05-01

## 2018-07-17 PROBLEM — G47.30 SLEEP APNEA, UNSPECIFIED: Chronic | Status: ACTIVE | Noted: 2018-05-09

## 2018-07-22 PROBLEM — Z78.9 ALCOHOL USE: Status: ACTIVE | Noted: 2017-03-17

## 2018-07-22 PROBLEM — Z85.828 HISTORY OF BASAL CELL CARCINOMA: Status: RESOLVED | Noted: 2017-03-17 | Resolved: 2018-07-22

## 2018-07-27 ENCOUNTER — APPOINTMENT (OUTPATIENT)
Dept: ORTHOPEDIC SURGERY | Facility: CLINIC | Age: 63
End: 2018-07-27
Payer: COMMERCIAL

## 2018-07-27 VITALS
BODY MASS INDEX: 38.27 KG/M2 | DIASTOLIC BLOOD PRESSURE: 88 MMHG | HEIGHT: 63 IN | WEIGHT: 216 LBS | SYSTOLIC BLOOD PRESSURE: 173 MMHG | HEART RATE: 77 BPM

## 2018-07-27 PROCEDURE — 99024 POSTOP FOLLOW-UP VISIT: CPT

## 2018-07-27 PROCEDURE — 73562 X-RAY EXAM OF KNEE 3: CPT | Mod: LT

## 2018-08-07 ENCOUNTER — RX RENEWAL (OUTPATIENT)
Age: 63
End: 2018-08-07

## 2018-08-28 ENCOUNTER — RX RENEWAL (OUTPATIENT)
Age: 63
End: 2018-08-28

## 2018-08-30 PROBLEM — K21.9 GASTRO-ESOPHAGEAL REFLUX DISEASE WITHOUT ESOPHAGITIS: Chronic | Status: ACTIVE | Noted: 2017-05-01

## 2018-08-30 PROBLEM — R91.1 SOLITARY PULMONARY NODULE: Chronic | Status: ACTIVE | Noted: 2017-05-01

## 2018-08-30 PROBLEM — M51.36 OTHER INTERVERTEBRAL DISC DEGENERATION, LUMBAR REGION: Chronic | Status: ACTIVE | Noted: 2017-05-01

## 2018-08-30 PROBLEM — M19.90 UNSPECIFIED OSTEOARTHRITIS, UNSPECIFIED SITE: Chronic | Status: ACTIVE | Noted: 2018-05-09

## 2018-08-30 PROBLEM — E55.9 VITAMIN D DEFICIENCY, UNSPECIFIED: Chronic | Status: ACTIVE | Noted: 2017-05-01

## 2018-08-30 PROBLEM — M25.561 PAIN IN RIGHT KNEE: Chronic | Status: ACTIVE | Noted: 2018-05-09

## 2018-08-31 ENCOUNTER — APPOINTMENT (OUTPATIENT)
Dept: ORTHOPEDIC SURGERY | Facility: CLINIC | Age: 63
End: 2018-08-31
Payer: COMMERCIAL

## 2018-08-31 PROCEDURE — 99214 OFFICE O/P EST MOD 30 MIN: CPT

## 2018-08-31 PROCEDURE — 73562 X-RAY EXAM OF KNEE 3: CPT | Mod: LT

## 2018-09-05 ENCOUNTER — RX RENEWAL (OUTPATIENT)
Age: 63
End: 2018-09-05

## 2018-09-17 ENCOUNTER — APPOINTMENT (OUTPATIENT)
Dept: HEMATOLOGY ONCOLOGY | Facility: CLINIC | Age: 63
End: 2018-09-17
Payer: COMMERCIAL

## 2018-09-17 ENCOUNTER — LABORATORY RESULT (OUTPATIENT)
Age: 63
End: 2018-09-17

## 2018-09-17 VITALS
WEIGHT: 224 LBS | HEIGHT: 63 IN | TEMPERATURE: 98.7 F | SYSTOLIC BLOOD PRESSURE: 129 MMHG | DIASTOLIC BLOOD PRESSURE: 79 MMHG | BODY MASS INDEX: 39.69 KG/M2 | HEART RATE: 81 BPM

## 2018-09-17 DIAGNOSIS — Z85.048 ENCOUNTER FOR FOLLOW-UP EXAMINATION AFTER COMPLETED TREATMENT FOR MALIGNANT NEOPLASM: ICD-10-CM

## 2018-09-17 DIAGNOSIS — Z08 ENCOUNTER FOR FOLLOW-UP EXAMINATION AFTER COMPLETED TREATMENT FOR MALIGNANT NEOPLASM: ICD-10-CM

## 2018-09-17 LAB
HCT VFR BLD CALC: 37.5 %
HGB BLD-MCNC: 12.8 G/DL
MCHC RBC-ENTMCNC: 28.2 PG
MCHC RBC-ENTMCNC: 34.2 GM/DL
MCV RBC AUTO: 82.5 FL
PLATELET # BLD AUTO: 173 K/UL
RBC # BLD: 4.55 M/UL
RBC # FLD: 12 %
WBC # FLD AUTO: 4.8 K/UL

## 2018-09-17 PROCEDURE — 36415 COLL VENOUS BLD VENIPUNCTURE: CPT

## 2018-09-17 PROCEDURE — 99214 OFFICE O/P EST MOD 30 MIN: CPT | Mod: 25

## 2018-09-17 PROCEDURE — 85025 COMPLETE CBC W/AUTO DIFF WBC: CPT

## 2018-09-17 RX ORDER — HYDROMORPHONE HYDROCHLORIDE 2 MG/1
2 TABLET ORAL
Qty: 50 | Refills: 0 | Status: DISCONTINUED | COMMUNITY
Start: 2018-06-06 | End: 2018-09-17

## 2018-09-17 RX ORDER — CELECOXIB 200 MG/1
200 CAPSULE ORAL
Qty: 30 | Refills: 0 | Status: DISCONTINUED | COMMUNITY
Start: 2018-06-06 | End: 2018-09-17

## 2018-09-17 RX ORDER — MELOXICAM 15 MG/1
15 TABLET ORAL
Qty: 30 | Refills: 2 | Status: DISCONTINUED | COMMUNITY
Start: 2018-04-26 | End: 2018-09-17

## 2018-09-17 RX ORDER — ASCORBIC ACID 500 MG
TABLET,CHEWABLE ORAL
Refills: 0 | Status: DISCONTINUED | COMMUNITY
End: 2018-09-17

## 2018-09-17 RX ORDER — HYALURONATE SODIUM 20 MG/2 ML
20 SYRINGE (ML) INTRAARTICULAR
Qty: 6 | Refills: 0 | Status: DISCONTINUED | COMMUNITY
Start: 2018-04-20 | End: 2018-09-17

## 2018-09-17 RX ORDER — PANTOPRAZOLE 40 MG/1
40 TABLET, DELAYED RELEASE ORAL
Refills: 0 | Status: DISCONTINUED | COMMUNITY
Start: 2017-03-13 | End: 2018-09-17

## 2018-09-17 RX ORDER — MELOXICAM 15 MG/1
15 TABLET ORAL DAILY
Qty: 30 | Refills: 0 | Status: DISCONTINUED | COMMUNITY
Start: 2017-05-15 | End: 2018-09-17

## 2018-09-17 RX ORDER — DICLOFENAC SODIUM 10 MG/G
1 GEL TOPICAL DAILY
Qty: 4 | Refills: 0 | Status: DISCONTINUED | COMMUNITY
Start: 2017-07-10 | End: 2018-09-17

## 2018-09-17 RX ORDER — HYALURONATE SODIUM 20 MG/2 ML
20 SYRINGE (ML) INTRAARTICULAR
Qty: 12 | Refills: 0 | Status: DISCONTINUED | OUTPATIENT
Start: 2017-05-12 | End: 2018-09-17

## 2018-09-17 RX ORDER — HYALURONATE SODIUM 20 MG/2 ML
20 SYRINGE (ML) INTRAARTICULAR
Qty: 12 | Refills: 0 | Status: DISCONTINUED | OUTPATIENT
Start: 2018-04-20 | End: 2018-09-17

## 2018-09-18 LAB
ALBUMIN SERPL ELPH-MCNC: 4.3 G/DL
ALP BLD-CCNC: 87 U/L
ALT SERPL-CCNC: 13 U/L
ANION GAP SERPL CALC-SCNC: 13 MMOL/L
AST SERPL-CCNC: 15 U/L
BILIRUB SERPL-MCNC: 1 MG/DL
BUN SERPL-MCNC: 18 MG/DL
CALCIUM SERPL-MCNC: 9.7 MG/DL
CHLORIDE SERPL-SCNC: 108 MMOL/L
CO2 SERPL-SCNC: 23 MMOL/L
CREAT SERPL-MCNC: 0.67 MG/DL
GLUCOSE SERPL-MCNC: 90 MG/DL
POTASSIUM SERPL-SCNC: 4 MMOL/L
PROT SERPL-MCNC: 6.4 G/DL
SODIUM SERPL-SCNC: 144 MMOL/L
VIT B12 SERPL-MCNC: 205 PG/ML

## 2018-10-02 ENCOUNTER — RX RENEWAL (OUTPATIENT)
Age: 63
End: 2018-10-02

## 2018-10-16 ENCOUNTER — RX RENEWAL (OUTPATIENT)
Age: 63
End: 2018-10-16

## 2018-10-19 ENCOUNTER — APPOINTMENT (OUTPATIENT)
Dept: ORTHOPEDIC SURGERY | Facility: CLINIC | Age: 63
End: 2018-10-19
Payer: COMMERCIAL

## 2018-10-19 VITALS
BODY MASS INDEX: 39.69 KG/M2 | HEART RATE: 85 BPM | HEIGHT: 63 IN | SYSTOLIC BLOOD PRESSURE: 118 MMHG | DIASTOLIC BLOOD PRESSURE: 81 MMHG | WEIGHT: 224 LBS

## 2018-10-19 PROCEDURE — 99213 OFFICE O/P EST LOW 20 MIN: CPT

## 2019-01-02 ENCOUNTER — OUTPATIENT (OUTPATIENT)
Dept: OUTPATIENT SERVICES | Facility: HOSPITAL | Age: 64
LOS: 1 days | Discharge: HOME | End: 2019-01-02
Payer: COMMERCIAL

## 2019-01-02 VITALS
SYSTOLIC BLOOD PRESSURE: 128 MMHG | WEIGHT: 222.01 LBS | RESPIRATION RATE: 20 BRPM | OXYGEN SATURATION: 100 % | HEART RATE: 71 BPM | HEIGHT: 63 IN | TEMPERATURE: 98 F | DIASTOLIC BLOOD PRESSURE: 81 MMHG

## 2019-01-02 DIAGNOSIS — Z98.84 BARIATRIC SURGERY STATUS: Chronic | ICD-10-CM

## 2019-01-02 DIAGNOSIS — Z98.890 OTHER SPECIFIED POSTPROCEDURAL STATES: Chronic | ICD-10-CM

## 2019-01-02 DIAGNOSIS — R19.7 DIARRHEA, UNSPECIFIED: ICD-10-CM

## 2019-01-02 DIAGNOSIS — Z90.49 ACQUIRED ABSENCE OF OTHER SPECIFIED PARTS OF DIGESTIVE TRACT: Chronic | ICD-10-CM

## 2019-01-02 DIAGNOSIS — Z84.89 FAMILY HISTORY OF OTHER SPECIFIED CONDITIONS: Chronic | ICD-10-CM

## 2019-01-02 DIAGNOSIS — Z87.19 PERSONAL HISTORY OF OTHER DISEASES OF THE DIGESTIVE SYSTEM: Chronic | ICD-10-CM

## 2019-01-02 DIAGNOSIS — Z98.891 HISTORY OF UTERINE SCAR FROM PREVIOUS SURGERY: Chronic | ICD-10-CM

## 2019-01-02 DIAGNOSIS — Z87.39 PERSONAL HISTORY OF OTHER DISEASES OF THE MUSCULOSKELETAL SYSTEM AND CONNECTIVE TISSUE: Chronic | ICD-10-CM

## 2019-01-02 DIAGNOSIS — K21.9 GASTRO-ESOPHAGEAL REFLUX DISEASE WITHOUT ESOPHAGITIS: ICD-10-CM

## 2019-01-02 DIAGNOSIS — Z96.652 PRESENCE OF LEFT ARTIFICIAL KNEE JOINT: Chronic | ICD-10-CM

## 2019-01-02 LAB
ANION GAP SERPL CALC-SCNC: 10 MMOL/L — SIGNIFICANT CHANGE UP (ref 5–17)
BASOPHILS # BLD AUTO: 0.04 K/UL — SIGNIFICANT CHANGE UP (ref 0–0.2)
BASOPHILS NFR BLD AUTO: 0.8 % — SIGNIFICANT CHANGE UP (ref 0–2)
BUN SERPL-MCNC: 17 MG/DL — SIGNIFICANT CHANGE UP (ref 7–23)
CALCIUM SERPL-MCNC: 9.2 MG/DL — SIGNIFICANT CHANGE UP (ref 8.5–10.1)
CHLORIDE SERPL-SCNC: 109 MMOL/L — HIGH (ref 96–108)
CO2 SERPL-SCNC: 24 MMOL/L — SIGNIFICANT CHANGE UP (ref 22–31)
CREAT SERPL-MCNC: 0.85 MG/DL — SIGNIFICANT CHANGE UP (ref 0.5–1.3)
EOSINOPHIL # BLD AUTO: 0.22 K/UL — SIGNIFICANT CHANGE UP (ref 0–0.5)
EOSINOPHIL NFR BLD AUTO: 4.2 % — SIGNIFICANT CHANGE UP (ref 0–6)
GLUCOSE SERPL-MCNC: 97 MG/DL — SIGNIFICANT CHANGE UP (ref 70–99)
HCT VFR BLD CALC: 37 % — SIGNIFICANT CHANGE UP (ref 34.5–45)
HGB BLD-MCNC: 12.9 G/DL — SIGNIFICANT CHANGE UP (ref 11.5–15.5)
IMM GRANULOCYTES NFR BLD AUTO: 0.2 % — SIGNIFICANT CHANGE UP (ref 0–1.5)
LYMPHOCYTES # BLD AUTO: 0.96 K/UL — LOW (ref 1–3.3)
LYMPHOCYTES # BLD AUTO: 18.4 % — SIGNIFICANT CHANGE UP (ref 13–44)
MCHC RBC-ENTMCNC: 29.1 PG — SIGNIFICANT CHANGE UP (ref 27–34)
MCHC RBC-ENTMCNC: 34.9 GM/DL — SIGNIFICANT CHANGE UP (ref 32–36)
MCV RBC AUTO: 83.5 FL — SIGNIFICANT CHANGE UP (ref 80–100)
MONOCYTES # BLD AUTO: 0.41 K/UL — SIGNIFICANT CHANGE UP (ref 0–0.9)
MONOCYTES NFR BLD AUTO: 7.9 % — SIGNIFICANT CHANGE UP (ref 2–14)
NEUTROPHILS # BLD AUTO: 3.58 K/UL — SIGNIFICANT CHANGE UP (ref 1.8–7.4)
NEUTROPHILS NFR BLD AUTO: 68.5 % — SIGNIFICANT CHANGE UP (ref 43–77)
NRBC # BLD: 0 /100 WBCS — SIGNIFICANT CHANGE UP (ref 0–0)
PLATELET # BLD AUTO: 164 K/UL — SIGNIFICANT CHANGE UP (ref 150–400)
POTASSIUM SERPL-MCNC: 4.2 MMOL/L — SIGNIFICANT CHANGE UP (ref 3.5–5.3)
POTASSIUM SERPL-SCNC: 4.2 MMOL/L — SIGNIFICANT CHANGE UP (ref 3.5–5.3)
RBC # BLD: 4.43 M/UL — SIGNIFICANT CHANGE UP (ref 3.8–5.2)
RBC # FLD: 12.7 % — SIGNIFICANT CHANGE UP (ref 10.3–14.5)
SODIUM SERPL-SCNC: 143 MMOL/L — SIGNIFICANT CHANGE UP (ref 135–145)
WBC # BLD: 5.22 K/UL — SIGNIFICANT CHANGE UP (ref 3.8–10.5)
WBC # FLD AUTO: 5.22 K/UL — SIGNIFICANT CHANGE UP (ref 3.8–10.5)

## 2019-01-02 PROCEDURE — 93010 ELECTROCARDIOGRAM REPORT: CPT

## 2019-01-02 NOTE — H&P PST ADULT - ASSESSMENT
64 y/o female with GERD, history of gastric sleeve. Complain of diarrhea and "acid in my stomach". Scheduled for upper endoscopy.     Plan:    1. NPO post midnight  2. Follow preop medication instructions from Dr. Peñaloza  3. Labs, EKG as per Dr. Peñaloza

## 2019-01-02 NOTE — H&P PST ADULT - PSH
H/O hiatal hernia    H/O total knee replacement, left    H/O umbilical hernia repair    History of  section  X2    .  History of trigger finger  right  S/P cholecystectomy    S/P left knee arthroscopy    Status post gastric banding  band removed and sleeve procedure done

## 2019-01-02 NOTE — H&P PST ADULT - HISTORY OF PRESENT ILLNESS
62 y/o female with GERD, history of gastric sleeve. Complain of diarrhea and "acid in my stomach". Scheduled for upper endoscopy.

## 2019-01-02 NOTE — H&P PST ADULT - PMH
Arthritis    Degenerative disc disease, lumbar    Gastroesophageal reflux disease, esophagitis presence not specified    Knee pain, bilateral  left worse  Lung nodule    Rectal cancer  2016 chemo radiation  Sleep apnea  cpap  pressure at 7  Thrombocytopenia  while on chemo  Vitamin D deficiency Arthritis    Degenerative disc disease, lumbar    Gastroesophageal reflux disease, esophagitis presence not specified    Knee pain, bilateral  left worse  Lung nodule    Obesity    Rectal cancer  2016 chemo radiation  S/P bariatric surgery  sleeve gastrectomy  Sleep apnea  cpap  pressure at 7  Thrombocytopenia  while on chemo  Vitamin D deficiency

## 2019-01-04 ENCOUNTER — APPOINTMENT (OUTPATIENT)
Dept: ORTHOPEDIC SURGERY | Facility: CLINIC | Age: 64
End: 2019-01-04
Payer: COMMERCIAL

## 2019-01-04 VITALS — TEMPERATURE: 98.2 F | SYSTOLIC BLOOD PRESSURE: 115 MMHG | HEART RATE: 79 BPM | DIASTOLIC BLOOD PRESSURE: 78 MMHG

## 2019-01-04 DIAGNOSIS — Z87.39 PERSONAL HISTORY OF OTHER DISEASES OF THE MUSCULOSKELETAL SYSTEM AND CONNECTIVE TISSUE: ICD-10-CM

## 2019-01-04 PROBLEM — Z98.84 BARIATRIC SURGERY STATUS: Chronic | Status: ACTIVE | Noted: 2019-01-02

## 2019-01-04 PROBLEM — E66.9 OBESITY, UNSPECIFIED: Chronic | Status: ACTIVE | Noted: 2019-01-02

## 2019-01-04 PROCEDURE — 73562 X-RAY EXAM OF KNEE 3: CPT | Mod: LT

## 2019-01-04 PROCEDURE — 99213 OFFICE O/P EST LOW 20 MIN: CPT

## 2019-01-08 ENCOUNTER — RESULT REVIEW (OUTPATIENT)
Age: 64
End: 2019-01-08

## 2019-01-08 ENCOUNTER — OUTPATIENT (OUTPATIENT)
Dept: OUTPATIENT SERVICES | Facility: HOSPITAL | Age: 64
LOS: 1 days | Discharge: HOME | End: 2019-01-08
Payer: COMMERCIAL

## 2019-01-08 VITALS
TEMPERATURE: 97 F | OXYGEN SATURATION: 100 % | WEIGHT: 216.93 LBS | SYSTOLIC BLOOD PRESSURE: 136 MMHG | DIASTOLIC BLOOD PRESSURE: 92 MMHG | RESPIRATION RATE: 17 BRPM | HEIGHT: 63 IN | HEART RATE: 83 BPM

## 2019-01-08 DIAGNOSIS — Z98.890 OTHER SPECIFIED POSTPROCEDURAL STATES: Chronic | ICD-10-CM

## 2019-01-08 DIAGNOSIS — Z87.39 PERSONAL HISTORY OF OTHER DISEASES OF THE MUSCULOSKELETAL SYSTEM AND CONNECTIVE TISSUE: Chronic | ICD-10-CM

## 2019-01-08 DIAGNOSIS — Z87.19 PERSONAL HISTORY OF OTHER DISEASES OF THE DIGESTIVE SYSTEM: Chronic | ICD-10-CM

## 2019-01-08 DIAGNOSIS — Z98.891 HISTORY OF UTERINE SCAR FROM PREVIOUS SURGERY: Chronic | ICD-10-CM

## 2019-01-08 DIAGNOSIS — Z98.84 BARIATRIC SURGERY STATUS: Chronic | ICD-10-CM

## 2019-01-08 DIAGNOSIS — Z90.49 ACQUIRED ABSENCE OF OTHER SPECIFIED PARTS OF DIGESTIVE TRACT: Chronic | ICD-10-CM

## 2019-01-08 DIAGNOSIS — Z96.652 PRESENCE OF LEFT ARTIFICIAL KNEE JOINT: Chronic | ICD-10-CM

## 2019-01-08 PROCEDURE — 88312 SPECIAL STAINS GROUP 1: CPT | Mod: 26

## 2019-01-08 PROCEDURE — 88305 TISSUE EXAM BY PATHOLOGIST: CPT | Mod: 26

## 2019-01-08 NOTE — ASU PATIENT PROFILE, ADULT - PMH
Arthritis    Degenerative disc disease, lumbar    Gastroesophageal reflux disease, esophagitis presence not specified    Knee pain, bilateral  left worse  Lung nodule    Obesity    Rectal cancer  2016 chemo radiation  S/P bariatric surgery  sleeve gastrectomy  Sleep apnea  cpap  pressure at 7  Thrombocytopenia  while on chemo  Vitamin D deficiency

## 2019-01-10 LAB — SURGICAL PATHOLOGY FINAL REPORT - CH: SIGNIFICANT CHANGE UP

## 2019-01-13 DIAGNOSIS — Z82.49 FAMILY HISTORY OF ISCHEMIC HEART DISEASE AND OTHER DISEASES OF THE CIRCULATORY SYSTEM: ICD-10-CM

## 2019-01-13 DIAGNOSIS — M19.90 UNSPECIFIED OSTEOARTHRITIS, UNSPECIFIED SITE: ICD-10-CM

## 2019-01-13 DIAGNOSIS — K44.9 DIAPHRAGMATIC HERNIA WITHOUT OBSTRUCTION OR GANGRENE: ICD-10-CM

## 2019-01-13 DIAGNOSIS — Z98.84 BARIATRIC SURGERY STATUS: ICD-10-CM

## 2019-01-13 DIAGNOSIS — Z99.89 DEPENDENCE ON OTHER ENABLING MACHINES AND DEVICES: ICD-10-CM

## 2019-01-13 DIAGNOSIS — Z90.49 ACQUIRED ABSENCE OF OTHER SPECIFIED PARTS OF DIGESTIVE TRACT: ICD-10-CM

## 2019-01-13 DIAGNOSIS — Z83.3 FAMILY HISTORY OF DIABETES MELLITUS: ICD-10-CM

## 2019-01-13 DIAGNOSIS — M25.562 PAIN IN LEFT KNEE: ICD-10-CM

## 2019-01-13 DIAGNOSIS — K21.9 GASTRO-ESOPHAGEAL REFLUX DISEASE WITHOUT ESOPHAGITIS: ICD-10-CM

## 2019-01-13 DIAGNOSIS — Z96.652 PRESENCE OF LEFT ARTIFICIAL KNEE JOINT: ICD-10-CM

## 2019-01-13 DIAGNOSIS — Z87.891 PERSONAL HISTORY OF NICOTINE DEPENDENCE: ICD-10-CM

## 2019-01-13 DIAGNOSIS — Z92.3 PERSONAL HISTORY OF IRRADIATION: ICD-10-CM

## 2019-01-13 DIAGNOSIS — Z85.048 PERSONAL HISTORY OF OTHER MALIGNANT NEOPLASM OF RECTUM, RECTOSIGMOID JUNCTION, AND ANUS: ICD-10-CM

## 2019-01-13 DIAGNOSIS — E66.01 MORBID (SEVERE) OBESITY DUE TO EXCESS CALORIES: ICD-10-CM

## 2019-01-13 DIAGNOSIS — E55.9 VITAMIN D DEFICIENCY, UNSPECIFIED: ICD-10-CM

## 2019-01-13 DIAGNOSIS — G47.33 OBSTRUCTIVE SLEEP APNEA (ADULT) (PEDIATRIC): ICD-10-CM

## 2019-01-13 DIAGNOSIS — K29.50 UNSPECIFIED CHRONIC GASTRITIS WITHOUT BLEEDING: ICD-10-CM

## 2019-01-13 DIAGNOSIS — R19.7 DIARRHEA, UNSPECIFIED: ICD-10-CM

## 2019-01-13 DIAGNOSIS — M51.36 OTHER INTERVERTEBRAL DISC DEGENERATION, LUMBAR REGION: ICD-10-CM

## 2019-01-13 DIAGNOSIS — M25.561 PAIN IN RIGHT KNEE: ICD-10-CM

## 2019-02-06 ENCOUNTER — APPOINTMENT (OUTPATIENT)
Dept: ORTHOPEDIC SURGERY | Facility: CLINIC | Age: 64
End: 2019-02-06
Payer: COMMERCIAL

## 2019-02-06 VITALS
HEIGHT: 63 IN | WEIGHT: 224 LBS | BODY MASS INDEX: 39.69 KG/M2 | SYSTOLIC BLOOD PRESSURE: 115 MMHG | DIASTOLIC BLOOD PRESSURE: 77 MMHG | HEART RATE: 86 BPM

## 2019-02-06 DIAGNOSIS — M51.26 OTHER INTERVERTEBRAL DISC DISPLACEMENT, LUMBAR REGION: ICD-10-CM

## 2019-02-06 PROCEDURE — 99214 OFFICE O/P EST MOD 30 MIN: CPT

## 2019-02-06 PROCEDURE — 72100 X-RAY EXAM L-S SPINE 2/3 VWS: CPT

## 2019-02-06 RX ORDER — CELECOXIB 200 MG/1
200 CAPSULE ORAL
Qty: 30 | Refills: 0 | Status: DISCONTINUED | COMMUNITY
Start: 2018-07-16 | End: 2019-02-06

## 2019-02-06 NOTE — PHYSICAL EXAM
[de-identified] : Spine: CONSTITUTIONAL: The patient is a very pleasant individual who is obese and who appears stated age.\par \par PSYCHIATRIC: The patient is alert and oriented X 3 and in no apparent distress.\par HEENT: Atraumatic and is nonsyndromic in appearance.\par \par RESPIRATORY: Respiratory rate is regular, No MILLER\par \par LYMPHATICS: There is no inguinal lymphadenopathy\par \par INTEGUMENTARY: Skin is clean, dry, and intact about the bilateral lower extremities and lumbar spine.\par \par VASCULAR: There is brisk capillary refill about the bilateral lower extremities and dorsalis pedis pulses are 2/4. \par \par NEUROLOGIC: There are no pathologic reflexes. There is no decrease in sensation of the bilateral lower extremities on Wartenberg pinwheel examination. Deep tendon reflexes are well maintained at 2+/4 of the bilateral lower extremities and are symmetric. \par \par MUSCULOSKELETAL: There is no visible muscular atrophy. Manual motor strength is well maintained in the bilateral lower extremities. Range of motion of lumbar spine is well maintained. The patient ambulates in a non-myelopathic manner. Negative tension sign and straight leg raise bilaterally. Quad extension, ankle dorsiflexion, EHL, plantar flexion, and ankle eversion are well preserved. Normal secondary orthopaedic exam of bilateral piriformis, hips, greater trochanters, knees. axial back pain reproducible with palpation. Pain with rising from a seated to standing position. No focal motor deficits.Diffuse tenderness to palpation to left lumbar paraspinal superior gluteal and greater trochanteric region and along the IT band [de-identified] : lumbar x-rays today with multiple levels of lumbar spondylosis

## 2019-02-06 NOTE — DISCUSSION/SUMMARY
[Medication Risks Reviewed] : Medication risks reviewed [de-identified] : 63-year-old female with lumbar degenerative disc disease and left lower extremity radiculopathy, left greater trochanteric bursitis. We'll initiate conservative management with physical therapy for core strengthening exercise soft tissue modality and Hip musculature strengthening IT band and tensor fascia stacie stretching.  She will continue the use of Tylenol and calcium milligrams every 8 hours p.r.n. pain, patient unable to take NSAIDs due to history of gastric sleeve. She was provided a Medrol Dosepak which she will consult with her bariatric surgeon prior to use. MRI of lumbar spine to evaluate for worsening left lower extremity radiculopathy and paresthesias. She was also referred to Dr. Guerrero for an ultrasound-guided left greater trochanteric bursa injection with corticosteroid and anesthetic. She will follow up after MRI is complete

## 2019-02-06 NOTE — REVIEW OF SYSTEMS
[Joint Pain] : joint pain [Joint Stiffness] : joint stiffness [Joint Swelling] : joint swelling [Feeling Tired] : fatigue [Constipation] : constipation [Diarrhea] : diarrhea

## 2019-02-06 NOTE — HISTORY OF PRESENT ILLNESS
[de-identified] : 63-year-old female past medical history of anal CA neuropathy, history of gastric sleeve , status post left TKR presents with complaints of lower back pain radiating from the buttock lateral aspect of the hip down to the foot. She describes her pain as constant sharp shooting and stabbing. Her pain is worsened with sitting to standing, mechanical movement. The Patient is able to walk for a half hour but needs to stop due to worsening of back pain. She reports having left foot tingling but has a history of neuropathy but it seems as if it's worsening since the onset of leg pain. She has been taking Tylenol intermittently for pain without any improvement.  She is currently not involved for physical therapy.

## 2019-02-09 ENCOUNTER — OUTPATIENT (OUTPATIENT)
Dept: OUTPATIENT SERVICES | Facility: HOSPITAL | Age: 64
LOS: 1 days | End: 2019-02-09
Payer: COMMERCIAL

## 2019-02-09 ENCOUNTER — APPOINTMENT (OUTPATIENT)
Dept: MRI IMAGING | Facility: CLINIC | Age: 64
End: 2019-02-09
Payer: COMMERCIAL

## 2019-02-09 DIAGNOSIS — Z98.890 OTHER SPECIFIED POSTPROCEDURAL STATES: Chronic | ICD-10-CM

## 2019-02-09 DIAGNOSIS — Z96.652 PRESENCE OF LEFT ARTIFICIAL KNEE JOINT: Chronic | ICD-10-CM

## 2019-02-09 DIAGNOSIS — Z90.49 ACQUIRED ABSENCE OF OTHER SPECIFIED PARTS OF DIGESTIVE TRACT: Chronic | ICD-10-CM

## 2019-02-09 DIAGNOSIS — Z00.8 ENCOUNTER FOR OTHER GENERAL EXAMINATION: ICD-10-CM

## 2019-02-09 DIAGNOSIS — Z98.891 HISTORY OF UTERINE SCAR FROM PREVIOUS SURGERY: Chronic | ICD-10-CM

## 2019-02-09 DIAGNOSIS — Z98.84 BARIATRIC SURGERY STATUS: Chronic | ICD-10-CM

## 2019-02-09 DIAGNOSIS — Z87.39 PERSONAL HISTORY OF OTHER DISEASES OF THE MUSCULOSKELETAL SYSTEM AND CONNECTIVE TISSUE: Chronic | ICD-10-CM

## 2019-02-09 DIAGNOSIS — Z87.19 PERSONAL HISTORY OF OTHER DISEASES OF THE DIGESTIVE SYSTEM: Chronic | ICD-10-CM

## 2019-02-09 PROCEDURE — 72148 MRI LUMBAR SPINE W/O DYE: CPT

## 2019-02-09 PROCEDURE — 72148 MRI LUMBAR SPINE W/O DYE: CPT | Mod: 26

## 2019-03-01 ENCOUNTER — APPOINTMENT (OUTPATIENT)
Dept: ORTHOPEDIC SURGERY | Facility: CLINIC | Age: 64
End: 2019-03-01
Payer: COMMERCIAL

## 2019-03-01 VITALS
HEART RATE: 74 BPM | DIASTOLIC BLOOD PRESSURE: 74 MMHG | HEIGHT: 63 IN | WEIGHT: 224 LBS | SYSTOLIC BLOOD PRESSURE: 124 MMHG | BODY MASS INDEX: 39.69 KG/M2

## 2019-03-01 PROCEDURE — 20610 DRAIN/INJ JOINT/BURSA W/O US: CPT | Mod: RT

## 2019-03-01 NOTE — PHYSICAL EXAM
[de-identified] : The patient appears well nourished and in no apparent distress. The patient is alert and oriented to person, place, and time. Affect and mood appear normal. The head is normocephalic and atraumatic. Skin shows normal turgor with no evidence of eczema or psoriasis. No respiratory distress noted. Sensation grossly intact. MUSCULOSKELETAL:   SEE BELOW\par \par Right knee exam demonstrates no significant surgical scars but no erythema. No ecchymosis. There is full extension to 0°. Flexion is limited to approximately 105° secondary to pain. There is medial joint tenderness. Mild patellofemoral crepitus. There is difficulty mounting the exam table. There is no anterior posterior instability. There is slight laxity with valgus and valgus stress. No calf tenderness. No ulcerations. [de-identified] : Procedure: of right knee\par Indication:  Osteoarthritis. \par Risk and benefits were discussed with the patient. Potential complications include bleeding and infection. Verbal consent was obtained prior to the procedure. \par Chloraprep was used to prep the area. ethyl chloride spray was used as a topical anesthetic. Using sterile technique, the aspiration/injection needle was then directed from a medial aspect. A 22-gauge was used to inject 2mL Euflexxa lot# 1. A bandage was applied. the patient tolerated the procedure well. \par Complications: none. \par Patient instructed to avoid strenuous activity for 1 day(s). \par Follow-up in the office in one week. \par

## 2019-03-01 NOTE — REASON FOR VISIT
[Follow-Up Visit] : a follow-up visit for [FreeTextEntry2] : right knee Euflexxa LOT [id number]  EXP 2019/01/23

## 2019-03-01 NOTE — DISCUSSION/SUMMARY
[de-identified] : Injection is performed today without difficulty. The patient be seen back next week for second injection. She will continue her activities as tolerated. All questions were answered to the patient's satisfaction.

## 2019-03-01 NOTE — HISTORY OF PRESENT ILLNESS
[de-identified] : Patient presented today for evaluation of right knee pain. The patient on to have medial compartment DJD. She is a past corticosteroid injections as well as gel injections. She is here today to start Euflexxa injection series. She continues to have medial knee pain. She reports that stiffness to stand from a seated position. She denies any recent trauma or injuries to the knee. She is limping. She is not yet ready to consider total knee replacement.\par \par Review of Systems-\par Constitutional: No fever or chills. \par Cardiovascular: No orthopnea or chest pain\par Pulmonary: No shortness of breath. \par GI: No nausea or vomiting or abdominal pain.\par Musculoskeletal: see HPI \par Psychiatric: No anxiety and depression.

## 2019-03-04 ENCOUNTER — APPOINTMENT (OUTPATIENT)
Dept: ORTHOPEDIC SURGERY | Facility: CLINIC | Age: 64
End: 2019-03-04
Payer: COMMERCIAL

## 2019-03-04 VITALS
HEART RATE: 103 BPM | DIASTOLIC BLOOD PRESSURE: 84 MMHG | HEIGHT: 63 IN | SYSTOLIC BLOOD PRESSURE: 135 MMHG | WEIGHT: 218 LBS | BODY MASS INDEX: 38.62 KG/M2

## 2019-03-04 PROCEDURE — 99214 OFFICE O/P EST MOD 30 MIN: CPT

## 2019-03-04 NOTE — HISTORY OF PRESENT ILLNESS
[Pain Location] : pain [Stable] : stable [4] : a current pain level of 4/10 [Daily] : ~He/She~ states the symptoms seem to be occuring daily [Sitting] : worsened by sitting [Physical Therapy] : relieved by physical therapy [de-identified] : 63yo F presents for MRI review of lumbar spine.  she reports physical therapy has helped with pain.  she did not get the US guided injection at this time.  she had good relief with the medrol dose pack [Incontinence] : no incontinence [Urinary Ret.] : no urinary retention

## 2019-03-04 NOTE — DISCUSSION/SUMMARY
[de-identified] : 63yo F with lumbar degenerative disc disease and b/l greater trochanteric bursitis.  she was encouraged to get b/l greater trochanteric bursa injections Ultrasound guided with Dr. Guerrero.  She will continue with PT.  Tylenol as needed for pain.Discussed pain management injections pt wishes to hold off at this time.    She will f/u prn

## 2019-03-04 NOTE — PHYSICAL EXAM
[de-identified] : Spine: CONSTITUTIONAL: The patient is a very pleasant individual who is obese and who appears stated age.\par \par PSYCHIATRIC: The patient is alert and oriented X 3 and in no apparent distress.\par HEENT: Atraumatic and is nonsyndromic in appearance.\par \par RESPIRATORY: Respiratory rate is regular, No MILLER\par \par LYMPHATICS: There is no inguinal lymphadenopathy\par \par INTEGUMENTARY: Skin is clean, dry, and intact about the bilateral lower extremities and lumbar spine.\par \par VASCULAR: There is brisk capillary refill about the bilateral lower extremities and dorsalis pedis pulses are 2/4. \par \par NEUROLOGIC: There are no pathologic reflexes. There is no decrease in sensation of the bilateral lower extremities on Wartenberg pinwheel examination. Deep tendon reflexes are well maintained at 2+/4 of the bilateral lower extremities and are symmetric. \par \par MUSCULOSKELETAL: There is no visible muscular atrophy. Manual motor strength is well maintained in the bilateral lower extremities. Range of motion of lumbar spine is well maintained. The patient ambulates in a non-myelopathic manner. Negative tension sign and straight leg raise bilaterally. Quad extension, ankle dorsiflexion, EHL, plantar flexion, and ankle eversion are well preserved. Normal secondary orthopaedic exam of bilateral piriformis, hips, greater trochanters, knees. axial back pain reproducible with palpation. Pain with rising from a seated to standing position. No focal motor deficits.Diffuse tenderness to palpation to left lumbar paraspinal superior gluteal and b/l greater trochanteric region and along the IT band [de-identified] : MRI of lumbar spine with Degenerative disc changes at L1/L2-L5/S1.  At L1/L2, there \par is a small right paracentral disc herniation with slight inferior migration, that may contact the right-sided descending nerve root. There is no \par significant foraminal or central spinal canal stenosis. \par

## 2019-03-08 ENCOUNTER — APPOINTMENT (OUTPATIENT)
Dept: ORTHOPEDIC SURGERY | Facility: CLINIC | Age: 64
End: 2019-03-08
Payer: COMMERCIAL

## 2019-03-08 PROCEDURE — 20610 DRAIN/INJ JOINT/BURSA W/O US: CPT | Mod: RT

## 2019-03-08 NOTE — PHYSICAL EXAM
[de-identified] : The patient appears well nourished and in no apparent distress. The patient is alert and oriented to person, place, and time. Affect and mood appear normal. The head is normocephalic and atraumatic. Skin shows normal turgor with no evidence of eczema or psoriasis. No respiratory distress noted. Sensation grossly intact. MUSCULOSKELETAL:   SEE BELOW\par \par Right knee exam demonstrates no significant surgical scars but no erythema. No ecchymosis. There is full extension to 0°. Flexion is limited to approximately 105° secondary to pain. There is medial joint tenderness. Mild patellofemoral crepitus. There is difficulty mounting the exam table. There is no anterior posterior instability. There is slight laxity with valgus and valgus stress. No calf tenderness. No ulcerations.

## 2019-03-08 NOTE — DISCUSSION/SUMMARY
[de-identified] : Second reflex injection was performed today without difficulty. She'll be seen next week for her third and final injection.

## 2019-03-08 NOTE — REASON FOR VISIT
[Initial Visit] : an initial visit for [FreeTextEntry2] : right knee Euflexxa LOT [id number]   EXP 2019/04/23

## 2019-03-08 NOTE — PROCEDURE
[de-identified] : Procedure: of the right. \par Indication:  Osteoarthritis. \par Risk and benefits were discussed with the patient. Potential complications include bleeding and infection. Verbal consent was obtained prior to the procedure. \par Chloraprep was used to prep the area. ethyl chloride spray was used as a topical anesthetic. Using sterile technique, the aspiration/injection needle was then directed from a lateral aspect. A 22-gauge was used to inject 2mL Euflexxa lot# 1. A bandage was applied. the patient tolerated the procedure well. \par Complications: none. \par Patient instructed to avoid strenuous activity for 1 day(s). \par Follow-up in the office in 1 week(s). \par

## 2019-03-08 NOTE — HISTORY OF PRESENT ILLNESS
[de-identified] : Patient presents today for second Euflexxa of the right knee. She reports she has not had any improvement in her pain since last visit. She continues to have anterior knee pain.

## 2019-03-08 NOTE — HISTORY OF PRESENT ILLNESS
[de-identified] : Patient presents today for second Euflexxa of the right knee. She reports she has not had any improvement in her pain since last visit. She continues to have anterior knee pain.

## 2019-03-08 NOTE — REASON FOR VISIT
[Initial Visit] : an initial visit for [FreeTextEntry2] : right knee Euflexxa LOT [id number]   EXP 2019/04/23

## 2019-03-08 NOTE — PROCEDURE
[de-identified] : Procedure: of the right. \par Indication:  Osteoarthritis. \par Risk and benefits were discussed with the patient. Potential complications include bleeding and infection. Verbal consent was obtained prior to the procedure. \par Chloraprep was used to prep the area. ethyl chloride spray was used as a topical anesthetic. Using sterile technique, the aspiration/injection needle was then directed from a lateral aspect. A 22-gauge was used to inject 2mL Euflexxa lot# 1. A bandage was applied. the patient tolerated the procedure well. \par Complications: none. \par Patient instructed to avoid strenuous activity for 1 day(s). \par Follow-up in the office in 1 week(s). \par

## 2019-03-08 NOTE — DISCUSSION/SUMMARY
[de-identified] : Second reflex injection was performed today without difficulty. She'll be seen next week for her third and final injection.

## 2019-03-08 NOTE — PHYSICAL EXAM
[de-identified] : The patient appears well nourished and in no apparent distress. The patient is alert and oriented to person, place, and time. Affect and mood appear normal. The head is normocephalic and atraumatic. Skin shows normal turgor with no evidence of eczema or psoriasis. No respiratory distress noted. Sensation grossly intact. MUSCULOSKELETAL:   SEE BELOW\par \par Right knee exam demonstrates no significant surgical scars but no erythema. No ecchymosis. There is full extension to 0°. Flexion is limited to approximately 105° secondary to pain. There is medial joint tenderness. Mild patellofemoral crepitus. There is difficulty mounting the exam table. There is no anterior posterior instability. There is slight laxity with valgus and valgus stress. No calf tenderness. No ulcerations.

## 2019-03-15 ENCOUNTER — APPOINTMENT (OUTPATIENT)
Dept: ORTHOPEDIC SURGERY | Facility: CLINIC | Age: 64
End: 2019-03-15
Payer: COMMERCIAL

## 2019-03-15 VITALS
HEIGHT: 63 IN | DIASTOLIC BLOOD PRESSURE: 92 MMHG | HEART RATE: 72 BPM | SYSTOLIC BLOOD PRESSURE: 140 MMHG | WEIGHT: 220 LBS | BODY MASS INDEX: 38.98 KG/M2

## 2019-03-15 PROCEDURE — 73562 X-RAY EXAM OF KNEE 3: CPT | Mod: LT

## 2019-03-15 PROCEDURE — 99213 OFFICE O/P EST LOW 20 MIN: CPT | Mod: 25

## 2019-03-15 PROCEDURE — 20610 DRAIN/INJ JOINT/BURSA W/O US: CPT | Mod: RT

## 2019-03-15 NOTE — PROCEDURE
[Injection] : Injection [Right] : of the right [Effusion] : Effusion [Osteoarthritis] : Osteoarthritis [Inflammation] : Inflammation [Diagnostic] : Diagnostic [Joint Pain] : Joint pain [Patient] : patient [Risk] : risk [Benefits] : benefits [Alternatives] : alternatives [Bleeding] : bleeding [Infection] : infection [Allergic Reaction] : allergic reaction [Verbal Consent Obtained] : verbal consent was obtained prior to the procedure [Ethyl Chloride Spray] : ethyl chloride spray was used as a topical anesthetic [Medial] : medial [22] : a 22-gauge [1% Lidocaine___(mL)] : [unfilled] mL of 1% Lidocaine [2 mL Euflexxa___(lot #)] : 2mL Euflexxa ~Ulot# [unfilled] [Bandage Applied] : a bandage [None] : none [No Strenuous Activity___day(s)] : avoid strenuous activity for [unfilled] day(s) [PRN] : as needed [FreeTextEntry1] : Chlorhexidine

## 2019-03-15 NOTE — DISCUSSION/SUMMARY
[Medication Risks Reviewed] : Medication risks reviewed [Surgical risks reviewed] : Surgical risks reviewed [de-identified] : The patient was given the last viscose supplementation injection into the right knee for an established diagnosis of DJD. She was educated on postinjection expectations. As for the left knee, there were no periprosthetic issues. This is according to the exam and radiographs.Patient will continue anti-inflammatories, patches, and other conservative treatment modalities. All of her questions were answered to her satisfaction

## 2019-03-15 NOTE — PHYSICAL EXAM
[Normal] : Gait: normal [de-identified] : Well-healed midline scar to the left knee without erythema, drainage, redness or cellulitis. Range of motion 0-135. Less than 5° laxity with varus stresses. Negative anterior drawer. No extension lag. No flexion contractures. Discomfort of the lateral collateral ligament. The distal insertion of the iliotibial band. Calves are soft, nontender, noted to DVT at this time. Patient has good motor and sensory both of her legs. Muscle strength 5 over 5 there\par \par Skin is clean, dry, intact to the right knee. There was no reaction from her previous injection sites. Medial joint line tenderness. Range of motion 0-125. There is pain on further flexion crepitus of the patellofemoral joint and medial femoral tibial compartment. No extension lag. No flexion contractures. 7° laxity with varus stresses [de-identified] : intact [de-identified] : Left total knee replacement, grossly in anatomical alignment, good bone to prosthesis interface, there is no gross evidence of prosthetic failure, all 3 components anatomically aligned.

## 2019-03-15 NOTE — REASON FOR VISIT
[Follow-Up Visit] : a follow-up visit for [FreeTextEntry2] : Right knee Euflexxa Inj #3 Lot # [id number] Exp: 04/23/2019

## 2019-03-15 NOTE — HISTORY OF PRESENT ILLNESS
[Stable] : stable [2] : a current pain level of 2/10 [1] : an average pain level of 1/10 [6] : a minimum pain level of 6/10 [7] : a maximum pain level of 7/10 [Sitting] : sitting [Standing] : standing [Lifting] : lifting [Bending] : worsened by bending [Direct Pressure] : worsened by direct pressure [Walking] : worsened by walking [Knee Flexion] : worsened with knee flexion [Knee Extension] : worsened with knee extension [Acetaminophen] : relieved by acetaminophen [Ice] : relieved by ice [NSAIDs] : relieved by nonsteroidal anti-inflammatory drugs [Rest] : relieved by rest [None] : No relieving factors are noted [de-identified] : The patient is a 64-year-old female who presents today for her third and last viscose supplementation injection into the right knee for an established diagnosis of DJD. While she was here, she was complaining of left knee discomfort on the lateral aspect for several weeks. She is status post a left total knee replacement done exactly year ago. She denies any fevers or chills. She denies any recent injury or trauma. She is here for evaluation. She is handling without any assistive devices. [Ataxia] : no ataxia [Incontinence] : no incontinence [Loss of Dexterity] : good dexterity [Urinary Ret.] : no urinary retention

## 2019-03-17 ENCOUNTER — FORM ENCOUNTER (OUTPATIENT)
Age: 64
End: 2019-03-17

## 2019-03-18 ENCOUNTER — OUTPATIENT (OUTPATIENT)
Dept: OUTPATIENT SERVICES | Facility: HOSPITAL | Age: 64
LOS: 1 days | End: 2019-03-18
Payer: COMMERCIAL

## 2019-03-18 ENCOUNTER — APPOINTMENT (OUTPATIENT)
Dept: HEMATOLOGY ONCOLOGY | Facility: CLINIC | Age: 64
End: 2019-03-18
Payer: COMMERCIAL

## 2019-03-18 ENCOUNTER — APPOINTMENT (OUTPATIENT)
Dept: ULTRASOUND IMAGING | Facility: CLINIC | Age: 64
End: 2019-03-18
Payer: COMMERCIAL

## 2019-03-18 DIAGNOSIS — Z98.891 HISTORY OF UTERINE SCAR FROM PREVIOUS SURGERY: Chronic | ICD-10-CM

## 2019-03-18 DIAGNOSIS — Z87.19 PERSONAL HISTORY OF OTHER DISEASES OF THE DIGESTIVE SYSTEM: Chronic | ICD-10-CM

## 2019-03-18 DIAGNOSIS — Z96.652 PRESENCE OF LEFT ARTIFICIAL KNEE JOINT: Chronic | ICD-10-CM

## 2019-03-18 DIAGNOSIS — Z87.39 PERSONAL HISTORY OF OTHER DISEASES OF THE MUSCULOSKELETAL SYSTEM AND CONNECTIVE TISSUE: Chronic | ICD-10-CM

## 2019-03-18 DIAGNOSIS — Z98.890 OTHER SPECIFIED POSTPROCEDURAL STATES: Chronic | ICD-10-CM

## 2019-03-18 DIAGNOSIS — Z98.84 BARIATRIC SURGERY STATUS: Chronic | ICD-10-CM

## 2019-03-18 DIAGNOSIS — Z90.49 ACQUIRED ABSENCE OF OTHER SPECIFIED PARTS OF DIGESTIVE TRACT: Chronic | ICD-10-CM

## 2019-03-18 DIAGNOSIS — Z00.8 ENCOUNTER FOR OTHER GENERAL EXAMINATION: ICD-10-CM

## 2019-03-18 PROCEDURE — 20611 DRAIN/INJ JOINT/BURSA W/US: CPT

## 2019-03-18 PROCEDURE — 20611 DRAIN/INJ JOINT/BURSA W/US: CPT | Mod: 50

## 2019-03-25 ENCOUNTER — APPOINTMENT (OUTPATIENT)
Dept: ULTRASOUND IMAGING | Facility: CLINIC | Age: 64
End: 2019-03-25
Payer: COMMERCIAL

## 2019-03-25 ENCOUNTER — OUTPATIENT (OUTPATIENT)
Dept: OUTPATIENT SERVICES | Facility: HOSPITAL | Age: 64
LOS: 1 days | End: 2019-03-25
Payer: COMMERCIAL

## 2019-03-25 DIAGNOSIS — Z98.890 OTHER SPECIFIED POSTPROCEDURAL STATES: Chronic | ICD-10-CM

## 2019-03-25 DIAGNOSIS — Z98.84 BARIATRIC SURGERY STATUS: Chronic | ICD-10-CM

## 2019-03-25 DIAGNOSIS — Z98.891 HISTORY OF UTERINE SCAR FROM PREVIOUS SURGERY: Chronic | ICD-10-CM

## 2019-03-25 DIAGNOSIS — Z96.652 PRESENCE OF LEFT ARTIFICIAL KNEE JOINT: Chronic | ICD-10-CM

## 2019-03-25 DIAGNOSIS — Z87.39 PERSONAL HISTORY OF OTHER DISEASES OF THE MUSCULOSKELETAL SYSTEM AND CONNECTIVE TISSUE: Chronic | ICD-10-CM

## 2019-03-25 DIAGNOSIS — Z90.49 ACQUIRED ABSENCE OF OTHER SPECIFIED PARTS OF DIGESTIVE TRACT: Chronic | ICD-10-CM

## 2019-03-25 DIAGNOSIS — Z87.19 PERSONAL HISTORY OF OTHER DISEASES OF THE DIGESTIVE SYSTEM: Chronic | ICD-10-CM

## 2019-03-25 DIAGNOSIS — Z00.8 ENCOUNTER FOR OTHER GENERAL EXAMINATION: ICD-10-CM

## 2019-03-25 PROCEDURE — 76775 US EXAM ABDO BACK WALL LIM: CPT

## 2019-03-25 PROCEDURE — 76775 US EXAM ABDO BACK WALL LIM: CPT | Mod: 26

## 2019-04-22 ENCOUNTER — LABORATORY RESULT (OUTPATIENT)
Age: 64
End: 2019-04-22

## 2019-04-22 ENCOUNTER — APPOINTMENT (OUTPATIENT)
Dept: HEMATOLOGY ONCOLOGY | Facility: CLINIC | Age: 64
End: 2019-04-22
Payer: COMMERCIAL

## 2019-04-22 VITALS
SYSTOLIC BLOOD PRESSURE: 109 MMHG | TEMPERATURE: 98.8 F | BODY MASS INDEX: 41.82 KG/M2 | HEART RATE: 73 BPM | WEIGHT: 236 LBS | DIASTOLIC BLOOD PRESSURE: 71 MMHG | HEIGHT: 63 IN

## 2019-04-22 DIAGNOSIS — E53.8 DEFICIENCY OF OTHER SPECIFIED B GROUP VITAMINS: ICD-10-CM

## 2019-04-22 LAB
HCT VFR BLD CALC: 32.8 %
HGB BLD-MCNC: 11.6 G/DL
MCHC RBC-ENTMCNC: 29.6 PG
MCHC RBC-ENTMCNC: 35.3 GM/DL
MCV RBC AUTO: 83.9 FL
PLATELET # BLD AUTO: 234 K/UL
RBC # BLD: 3.91 M/UL
RBC # FLD: 12.8 %
WBC # FLD AUTO: 5.8 K/UL

## 2019-04-22 PROCEDURE — 85025 COMPLETE CBC W/AUTO DIFF WBC: CPT

## 2019-04-22 PROCEDURE — 36415 COLL VENOUS BLD VENIPUNCTURE: CPT

## 2019-04-22 PROCEDURE — 99214 OFFICE O/P EST MOD 30 MIN: CPT | Mod: 25

## 2019-04-22 RX ORDER — METHYLPREDNISOLONE 4 MG/1
4 TABLET ORAL
Qty: 1 | Refills: 0 | Status: DISCONTINUED | COMMUNITY
Start: 2019-02-06 | End: 2019-04-22

## 2019-04-22 RX ORDER — SACCHAROMYCES BOULARDII 250 MG
250 CAPSULE ORAL
Refills: 0 | Status: ACTIVE | COMMUNITY
Start: 2019-04-22

## 2019-04-22 NOTE — ASSESSMENT
[FreeTextEntry1] : Ms. MAGANA 's questions were answered to her satisfaction. She expressed her understanding and willingness to comply with the above recommendations, and  will return to the office in 6 months.\par

## 2019-04-22 NOTE — PHYSICAL EXAM
[Fully active, able to carry on all pre-disease performance without restriction] : Status 0 - Fully active, able to carry on all pre-disease performance without restriction [Normal] : normal appearance, no rash, nodules, vesicles, ulcers, erythema [Obese] : obese [de-identified] : chronic lower extremity edema

## 2019-04-22 NOTE — HISTORY OF PRESENT ILLNESS
[de-identified] : 64 F with stage II anal squamous cell carcinoma diagnosed 2015.\par \par CASE SYNOPSIS:\par 12/2014- to rectal pressure, bleeding, pain with defecation.\par 2/12/15- sigmoidoscopy: Mass in the proximal anal canal and distal rectum, occupying the anterior one third of the circumference.\par Biopsy: Squamous cell carcinoma.\par CT CAP: Enlarged perirectal lymph nodes; no distant metastases. \par 3/10/15- PET/CT: Few stop centimeter pelvic lymph nodes too small to characterize. \par Indeterminate hypermetabolic right inguinal lymph node.\par 3/16 - 5/2015: : concurrent chemotherapy/XRT with mitomycin C/5 FU.\par 8/10/15 - CT scan with stable lung nodules and resolution of perirectal lymphadenopathy.\par 9/29/17 - CT abdomen/pelvis- IZABELLA.\par 8/2018 - last colonoscopy  with Dr. Silvio Orlando- reportedly negative.\par  [de-identified] : Patient last seen in the office on September 17, 2018. She has been feeling fine. She is complaining of occasional fatigue but denies bright red blood per rectum. No significant change in clinical picture since her last visit. Compliant with medication (vitamin B12 supplementation). Patient recovering after an episode of diverticulitis ( diarrhea) which started 2 weeks ago,for which she was prescribed antibiotics ( Dr. Scooter Peñaloza). She has also started cholestyramine, which helps reducing loose BMs. Complaining of lower extremity swelling. [FreeTextEntry1] : expectant surveillance\par \par

## 2019-04-23 LAB — VIT B12 SERPL-MCNC: 247 PG/ML

## 2019-05-03 ENCOUNTER — APPOINTMENT (OUTPATIENT)
Dept: RHEUMATOLOGY | Facility: CLINIC | Age: 64
End: 2019-05-03
Payer: COMMERCIAL

## 2019-05-03 VITALS
HEART RATE: 63 BPM | SYSTOLIC BLOOD PRESSURE: 138 MMHG | HEIGHT: 63 IN | DIASTOLIC BLOOD PRESSURE: 78 MMHG | WEIGHT: 226 LBS | BODY MASS INDEX: 40.04 KG/M2 | OXYGEN SATURATION: 98 %

## 2019-05-03 PROCEDURE — 99244 OFF/OP CNSLTJ NEW/EST MOD 40: CPT

## 2019-05-03 RX ORDER — ACETAMINOPHEN 650 MG/1
650 TABLET, FILM COATED, EXTENDED RELEASE ORAL 4 TIMES DAILY
Refills: 0 | Status: DISCONTINUED | COMMUNITY
Start: 2018-09-17 | End: 2019-05-03

## 2019-05-03 NOTE — HISTORY OF PRESENT ILLNESS
[FreeTextEntry1] : This is a 65 y/o woman with hx of anal CA, Left knee replacement 2018, gastric sleeve, who presents for  pain in her back.  \par \par She was advised by her spine doctor's office to see a rheumatologist.  \par Areas of pain include back, 2nd DIP of the right hand, in right knee.  She also reports swelling in her feet and ankles which is reduced with elevating her legs.  \par Her AM stiffness is primarily in the back and she feels stiffness all day.  \par \par For the back, she takes ES tylenol\par She has completed PT course.\par She has had injection therapy which she felt was temporary and that she has an aversion to injection to spine.\par She was placed on a medrol dosepak for her back, from which she developed day/night sweats.  \par \par \par She has had an MRI lumbar spine 2/9/19 shows degenerative Schmorl node, multiple levels of disc disease and degenerative change.  \par She also gets swelling in feet/ankles. \par \par \par diarrhea from anal CA vs. cholecystectomy.   She finds cholestyramine helps\par + wt gain, occ cough,

## 2019-05-03 NOTE — CONSULT LETTER
[Dear  ___] : Dear  [unfilled], [Consult Letter:] : I had the pleasure of evaluating your patient, [unfilled]. [Please see my note below.] : Please see my note below. [Consult Closing:] : Thank you very much for allowing me to participate in the care of this patient.  If you have any questions, please do not hesitate to contact me. [Sincerely,] : Sincerely, [FreeTextEntry3] : Desiree Duff MD

## 2019-05-03 NOTE — ASSESSMENT
[FreeTextEntry1] : 63 y/o woman with hx of anal CA, left knee replacement, lap band/gastric sleeve surgeries, presents for evaluation of multiple areas of pain.  On exam, she has paraspinal muscle tenderness for which she will benefit from a muscle relaxant.  She also has osteoarthritis of hands and right knee.  \par \par Paraspinal muscle tenderness\par -Rx tizanidine 2-4mg QHS.\par -Consider addition of tramadol\par \par OA hands\par -Avoid oral NSAID due to gastric sleeve.  \par -Trial of Topical voltaren gel.  Labs reviewed.  \par \par \par OA knees- Left knee replaced.  Right knee has recently received HA injections.  \par -Trial of topical diclofenac \par \par  f/u 4-6 weeks

## 2019-05-08 ENCOUNTER — OUTPATIENT (OUTPATIENT)
Dept: OUTPATIENT SERVICES | Facility: HOSPITAL | Age: 64
LOS: 1 days | End: 2019-05-08

## 2019-05-08 ENCOUNTER — APPOINTMENT (OUTPATIENT)
Dept: MRI IMAGING | Facility: CLINIC | Age: 64
End: 2019-05-08
Payer: COMMERCIAL

## 2019-05-08 DIAGNOSIS — Z87.19 PERSONAL HISTORY OF OTHER DISEASES OF THE DIGESTIVE SYSTEM: Chronic | ICD-10-CM

## 2019-05-08 DIAGNOSIS — Z98.891 HISTORY OF UTERINE SCAR FROM PREVIOUS SURGERY: Chronic | ICD-10-CM

## 2019-05-08 DIAGNOSIS — Z96.652 PRESENCE OF LEFT ARTIFICIAL KNEE JOINT: Chronic | ICD-10-CM

## 2019-05-08 DIAGNOSIS — Z98.890 OTHER SPECIFIED POSTPROCEDURAL STATES: Chronic | ICD-10-CM

## 2019-05-08 DIAGNOSIS — Z90.49 ACQUIRED ABSENCE OF OTHER SPECIFIED PARTS OF DIGESTIVE TRACT: Chronic | ICD-10-CM

## 2019-05-08 DIAGNOSIS — Z98.84 BARIATRIC SURGERY STATUS: Chronic | ICD-10-CM

## 2019-05-08 DIAGNOSIS — Z87.39 PERSONAL HISTORY OF OTHER DISEASES OF THE MUSCULOSKELETAL SYSTEM AND CONNECTIVE TISSUE: Chronic | ICD-10-CM

## 2019-05-08 DIAGNOSIS — Z96.652 PRESENCE OF LEFT ARTIFICIAL KNEE JOINT: ICD-10-CM

## 2019-05-08 PROCEDURE — 73721 MRI JNT OF LWR EXTRE W/O DYE: CPT | Mod: 26,LT

## 2019-05-17 ENCOUNTER — OTHER (OUTPATIENT)
Age: 64
End: 2019-05-17

## 2019-05-21 ENCOUNTER — OTHER (OUTPATIENT)
Age: 64
End: 2019-05-21

## 2019-05-22 ENCOUNTER — APPOINTMENT (OUTPATIENT)
Dept: ULTRASOUND IMAGING | Facility: CLINIC | Age: 64
End: 2019-05-22
Payer: COMMERCIAL

## 2019-05-22 ENCOUNTER — OUTPATIENT (OUTPATIENT)
Dept: OUTPATIENT SERVICES | Facility: HOSPITAL | Age: 64
LOS: 1 days | End: 2019-05-22
Payer: COMMERCIAL

## 2019-05-22 DIAGNOSIS — Z98.891 HISTORY OF UTERINE SCAR FROM PREVIOUS SURGERY: Chronic | ICD-10-CM

## 2019-05-22 DIAGNOSIS — Z00.8 ENCOUNTER FOR OTHER GENERAL EXAMINATION: ICD-10-CM

## 2019-05-22 DIAGNOSIS — I82.4Z3 ACUTE EMBOLISM AND THROMBOSIS OF UNSPECIFIED DEEP VEINS OF DISTAL LOWER EXTREMITY, BILATERAL: ICD-10-CM

## 2019-05-22 DIAGNOSIS — Z87.19 PERSONAL HISTORY OF OTHER DISEASES OF THE DIGESTIVE SYSTEM: Chronic | ICD-10-CM

## 2019-05-22 DIAGNOSIS — Z98.890 OTHER SPECIFIED POSTPROCEDURAL STATES: Chronic | ICD-10-CM

## 2019-05-22 DIAGNOSIS — Z87.39 PERSONAL HISTORY OF OTHER DISEASES OF THE MUSCULOSKELETAL SYSTEM AND CONNECTIVE TISSUE: Chronic | ICD-10-CM

## 2019-05-22 DIAGNOSIS — Z90.49 ACQUIRED ABSENCE OF OTHER SPECIFIED PARTS OF DIGESTIVE TRACT: Chronic | ICD-10-CM

## 2019-05-22 DIAGNOSIS — Z98.84 BARIATRIC SURGERY STATUS: Chronic | ICD-10-CM

## 2019-05-22 DIAGNOSIS — Z96.652 PRESENCE OF LEFT ARTIFICIAL KNEE JOINT: Chronic | ICD-10-CM

## 2019-05-22 PROCEDURE — 93970 EXTREMITY STUDY: CPT | Mod: 26

## 2019-05-22 PROCEDURE — 93970 EXTREMITY STUDY: CPT

## 2019-06-03 ENCOUNTER — TRANSCRIPTION ENCOUNTER (OUTPATIENT)
Age: 64
End: 2019-06-03

## 2019-06-07 ENCOUNTER — APPOINTMENT (OUTPATIENT)
Dept: ORTHOPEDIC SURGERY | Facility: CLINIC | Age: 64
End: 2019-06-07
Payer: COMMERCIAL

## 2019-06-07 VITALS — HEIGHT: 63 IN | BODY MASS INDEX: 39.87 KG/M2 | HEART RATE: 70 BPM | WEIGHT: 225 LBS

## 2019-06-07 PROCEDURE — 99213 OFFICE O/P EST LOW 20 MIN: CPT

## 2019-06-07 NOTE — HISTORY OF PRESENT ILLNESS
[de-identified] : 64-year-old female presents for MRI followup of her left total knee replacement. She continues to have pain worse with activity 3/10 mostly on the lateral aspect of her knee and radiating into the popliteal fossa. She's recently been placed on a prednisone taper for neck pain and it's helped her knee pain. She otherwise has no other complaints no fever chills or night sweats no paresthesias in the lower extremity.

## 2019-06-07 NOTE — DISCUSSION/SUMMARY
[Medication Risks Reviewed] : Medication risks reviewed [de-identified] : Left total knee replacement\par Left knee pain\par \par \par \par Treatment options were reviewed with the patient at this time recommend she starts calcitonin proper use of this medication as well as possible side effects were reviewed with her we also discussed weight loss program and the importance of losing weight. Patient's agreement with the plan we'll see her back in 6 weeks to reassess her

## 2019-06-07 NOTE — REASON FOR VISIT
[Follow-Up Visit] : a follow-up visit for [Artificial Knee Joint] : artificial knee joint [Knee Pain] : knee pain [FreeTextEntry2] : right knee pain.

## 2019-06-07 NOTE — PHYSICAL EXAM
[Normal] : Gait: normal [LE] : Sensory: Intact in bilateral lower extremities [DP] : dorsalis pedis 2+ and symmetric bilaterally [PT] : posterior tibial 2+ and symmetric bilaterally [de-identified] : Examination of the left knee: The skin is warm and dry with no lesions there is trace palpable effusion there is tenderness over the lateral aspect of the joint line. Also tenderness over the proximal tibia. Range of motion is well maintained zero 120+ degrees of flexion limited exam is stable with varus valgus stress and anterior posterior drawer is negative extensor lag. [de-identified] : MRI obtained at [facility]: There is mild stress reaction proximal tibial component no ostial lysis or luis a-prosthetic fracture is moderate joint effusion

## 2019-06-07 NOTE — REVIEW OF SYSTEMS
[Joint Pain] : joint pain [Negative] : Endocrine [Arthralgia] : no arthralgia [Joint Stiffness] : no joint stiffness [Joint Swelling] : no joint swelling

## 2019-06-12 ENCOUNTER — OTHER (OUTPATIENT)
Age: 64
End: 2019-06-12

## 2019-06-13 ENCOUNTER — APPOINTMENT (OUTPATIENT)
Dept: RHEUMATOLOGY | Facility: CLINIC | Age: 64
End: 2019-06-13
Payer: COMMERCIAL

## 2019-06-13 ENCOUNTER — APPOINTMENT (OUTPATIENT)
Dept: ORTHOPEDIC SURGERY | Facility: CLINIC | Age: 64
End: 2019-06-13

## 2019-06-13 VITALS
DIASTOLIC BLOOD PRESSURE: 81 MMHG | HEIGHT: 63 IN | SYSTOLIC BLOOD PRESSURE: 126 MMHG | WEIGHT: 228 LBS | HEART RATE: 85 BPM | BODY MASS INDEX: 40.4 KG/M2 | OXYGEN SATURATION: 97 %

## 2019-06-13 PROCEDURE — 99214 OFFICE O/P EST MOD 30 MIN: CPT | Mod: 25

## 2019-06-13 PROCEDURE — 96372 THER/PROPH/DIAG INJ SC/IM: CPT

## 2019-06-13 RX ORDER — KETOROLAC TROMETHAMINE 30 MG/ML
30 INJECTION, SOLUTION INTRAMUSCULAR; INTRAVENOUS
Qty: 1 | Refills: 0 | Status: COMPLETED | OUTPATIENT
Start: 2019-06-13

## 2019-06-13 RX ADMIN — KETOROLAC TROMETHAMINE 0 MG/ML: 30 INJECTION, SOLUTION INTRAMUSCULAR; INTRAVENOUS at 00:00

## 2019-06-13 NOTE — HISTORY OF PRESENT ILLNESS
[FreeTextEntry1] : This is a 65 y/o woman with hx of anal CA, Left knee replacement 2018, gastric sleeve, who presents for pain in her back.  She is being followed up for OA of her hands, and myalgias.\par \par Today, she has chief complaint of pain in mid back/left inner scapula area radiating to left arm to left 4th 5th fingers. The pain is 10/10 in intensity with 15-30 minutes.  \par She had visit with orthopedic spine doctor today, but she was rescheduled.   \par She had gone to urgent care previously for this problem a couple of weeks ago, received a shot in her arm, and also a rx for Prednisone 5 day course which was very helpful.  \par \par She is not sure if tizanidine was very helpful, but is asking for a refill of it.  \par \par She uses the voltaren gel left CMC, right medial epicondyle and she says it's helpful.\par \par She has had an MRI lumbar spine 2/9/19 shows degenerative Schmorl node, multiple levels of disc disease and degenerative change. \par She also gets swelling in feet/ankles. \par \par \par Diarrhea from anal CA vs. cholecystectomy. She finds cholestyramine helps\par

## 2019-06-13 NOTE — PROCEDURE
[Today's Date:] : Date: [unfilled] [FreeTextEntry1] : Procedure: IM ketorolac injection\par After skin prep with alcohol, 30mg ketorolac was injected to  right gluteal muscle .  There were no complications.\par

## 2019-06-13 NOTE — ASSESSMENT
[FreeTextEntry1] : \par 63 y/o woman with hx of anal CA, left knee replacement, lap band/gastric sleeve surgeries, presents for evaluation of multiple areas of pain. On exam, she has paraspinal muscle tenderness for which she will benefit from a muscle relaxant. She also has osteoarthritis of hands and right knee.  Today her biggest issue appears to be radiculopathy from Cervical spine. She was supposed to see her spine doctor today.  She states they rescheduled her to later this month.  \par \par Radiculopathy\par -30mg IM ketorolac given today given 10/10 pain.  Advised to keep compliant with PPI, and counseled on how to maximize its effectivity.  R/B/As of ketorolac discussed with patient including but not limited to stomach ulcer/GI bleed.\par -Rx medrol dosepak\par -Rx tramadol 50mg BID as needed if above not effective\par -f/u spine surgeon\par \par Paraspinal muscle tenderness\par -Rx tizanidine 2-4mg QHS.\par -Consider addition of tramadol\par \par OA hands\par -Will avoid regular use of oral NSAID due to gastric sleeve. \par -Trial of Topical voltaren gel. Labs reviewed. \par \par \par OA knees- Left knee replaced. Right knee has recently received HA injections. \par -Trial of topical diclofenac \par \par  f/u 3 months\par \par

## 2019-06-14 ENCOUNTER — APPOINTMENT (OUTPATIENT)
Dept: OBGYN | Facility: CLINIC | Age: 64
End: 2019-06-14
Payer: COMMERCIAL

## 2019-06-14 VITALS
BODY MASS INDEX: 39.87 KG/M2 | RESPIRATION RATE: 16 BRPM | SYSTOLIC BLOOD PRESSURE: 142 MMHG | HEIGHT: 63 IN | DIASTOLIC BLOOD PRESSURE: 80 MMHG | WEIGHT: 225 LBS | TEMPERATURE: 98.9 F

## 2019-06-14 DIAGNOSIS — Z01.419 ENCOUNTER FOR GYNECOLOGICAL EXAMINATION (GENERAL) (ROUTINE) W/OUT ABNORMAL FINDINGS: ICD-10-CM

## 2019-06-14 PROCEDURE — 99396 PREV VISIT EST AGE 40-64: CPT

## 2019-06-14 NOTE — PHYSICAL EXAM
[Awake] : awake [Alert] : alert [Acute Distress] : no acute distress [Mass] : no breast mass [Nipple Discharge] : no nipple discharge [Axillary LAD] : no axillary lymphadenopathy [Tender] : non tender [Soft] : soft [Oriented x3] : oriented to person, place, and time [No Bleeding] : there was no active vaginal bleeding [Normal] : uterus [Pap Obtained] : a Pap smear was performed [Uterine Adnexae] : were not tender and not enlarged

## 2019-06-17 LAB — HPV HIGH+LOW RISK DNA PNL CVX: NOT DETECTED

## 2019-06-18 ENCOUNTER — APPOINTMENT (OUTPATIENT)
Dept: ORTHOPEDIC SURGERY | Facility: CLINIC | Age: 64
End: 2019-06-18
Payer: COMMERCIAL

## 2019-06-18 VITALS
HEIGHT: 63 IN | BODY MASS INDEX: 39.87 KG/M2 | WEIGHT: 225 LBS | SYSTOLIC BLOOD PRESSURE: 155 MMHG | HEART RATE: 83 BPM | DIASTOLIC BLOOD PRESSURE: 86 MMHG

## 2019-06-18 DIAGNOSIS — Z86.39 PERSONAL HISTORY OF OTHER ENDOCRINE, NUTRITIONAL AND METABOLIC DISEASE: ICD-10-CM

## 2019-06-18 PROCEDURE — 99214 OFFICE O/P EST MOD 30 MIN: CPT

## 2019-06-18 PROCEDURE — 72040 X-RAY EXAM NECK SPINE 2-3 VW: CPT

## 2019-06-18 NOTE — HISTORY OF PRESENT ILLNESS
[de-identified] : 64 year old F presents with cervical spine pain. She states that this started 2 weeks ago. She went to urgent care and was given an injection and a Medrol Dose Pack. Pain radiates down her LUE into her hands. She states she is weak. She saw her RHEUM who gave her another injection and a Medrol Dose Pack. Relieved by sitting in a recliner. Hx of CA. [Ataxia] : no ataxia [Incontinence] : no incontinence [Loss of Dexterity] : good dexterity [Urinary Ret.] : no urinary retention

## 2019-06-18 NOTE — PHYSICAL EXAM
[Poor Appearance] : well-appearing [Acute Distress] : not in acute distress [de-identified] : CONSTITUTIONAL: Patient is a very pleasant individual who is well-nourished and appears stated age. \par PSYCHIATRIC: Alert and oriented times three and in no apparent distress, and participates with orthopedic evaluation well.\par HEAD: Atraumatic and nonsyndromic in appearance.\par EENT: No thyromegaly, EOMI.\par RESPIRATORY: Respiratory rate is regular, not dyspneic on examination.\par LYMPHATICS: There is no cervical or axillary lymphadenopathy.\par INTEGUMENTARY: Skin is clean, dry, and intact about the bilateral upper extremities and cervical spine. \par VASCULAR: There is brisk capillary refill about the bilateral upper extremities and radial pulses are 2/4. \par NEUROLOGIC: Negative L'hirmitte, negative Spurling’s sign. There are no pathologic reflexes. Deep tendon reflexes are well-maintained at +2/4 of the bilateral upper extremities and are symmetric.\par MUSCULOSKELETAL: There is no visible muscular atrophy. Manual motor strength is well maintained in the bilateral upper extremities. Cervical range of motion is well maintained. The patient ambulates in a non-myelopathic manner. Normal secondary orthopaedic exam of bilateral shoulders, elbows and hands. Elbow flexion and extension, wrist extension, finger flexion and abduction are well maintained. \par \par intense left cervical radiculopathy C6, C7, and C8 distribution. decrease in left triceps and  strength. [de-identified] : X-ray of the cervical spine taken today shows C5-C6 cervical spondylosis.

## 2019-06-18 NOTE — DISCUSSION/SUMMARY
[de-identified] : I have recommended that the pt continue with a conservative treatment plan. Pt has been given a short course of Tramadol to provide pain relief. An MRI has been ordered and is medically necessary due to persistent pain, numbness and tingling, weakness, to evaluate for a disc herniation. Pt has failed 2 injections and 2 Medrol Dose Packs. MRI will help guide treatment plan, injection therapy vs surgical intervention. The patient will follow up after the MRI results have been obtained.

## 2019-06-21 ENCOUNTER — FORM ENCOUNTER (OUTPATIENT)
Age: 64
End: 2019-06-21

## 2019-06-22 ENCOUNTER — OUTPATIENT (OUTPATIENT)
Dept: OUTPATIENT SERVICES | Facility: HOSPITAL | Age: 64
LOS: 1 days | End: 2019-06-22
Payer: COMMERCIAL

## 2019-06-22 ENCOUNTER — APPOINTMENT (OUTPATIENT)
Dept: MRI IMAGING | Facility: CLINIC | Age: 64
End: 2019-06-22
Payer: COMMERCIAL

## 2019-06-22 DIAGNOSIS — M47.812 SPONDYLOSIS WITHOUT MYELOPATHY OR RADICULOPATHY, CERVICAL REGION: ICD-10-CM

## 2019-06-22 DIAGNOSIS — Z90.49 ACQUIRED ABSENCE OF OTHER SPECIFIED PARTS OF DIGESTIVE TRACT: Chronic | ICD-10-CM

## 2019-06-22 DIAGNOSIS — Z87.39 PERSONAL HISTORY OF OTHER DISEASES OF THE MUSCULOSKELETAL SYSTEM AND CONNECTIVE TISSUE: Chronic | ICD-10-CM

## 2019-06-22 DIAGNOSIS — M54.12 RADICULOPATHY, CERVICAL REGION: ICD-10-CM

## 2019-06-22 DIAGNOSIS — Z98.84 BARIATRIC SURGERY STATUS: Chronic | ICD-10-CM

## 2019-06-22 DIAGNOSIS — Z98.890 OTHER SPECIFIED POSTPROCEDURAL STATES: Chronic | ICD-10-CM

## 2019-06-22 DIAGNOSIS — Z98.891 HISTORY OF UTERINE SCAR FROM PREVIOUS SURGERY: Chronic | ICD-10-CM

## 2019-06-22 DIAGNOSIS — Z00.8 ENCOUNTER FOR OTHER GENERAL EXAMINATION: ICD-10-CM

## 2019-06-22 DIAGNOSIS — Z96.652 PRESENCE OF LEFT ARTIFICIAL KNEE JOINT: Chronic | ICD-10-CM

## 2019-06-22 DIAGNOSIS — Z87.19 PERSONAL HISTORY OF OTHER DISEASES OF THE DIGESTIVE SYSTEM: Chronic | ICD-10-CM

## 2019-06-22 PROCEDURE — 72141 MRI NECK SPINE W/O DYE: CPT

## 2019-06-22 PROCEDURE — 72141 MRI NECK SPINE W/O DYE: CPT | Mod: 26

## 2019-07-02 ENCOUNTER — APPOINTMENT (OUTPATIENT)
Dept: ORTHOPEDIC SURGERY | Facility: CLINIC | Age: 64
End: 2019-07-02
Payer: COMMERCIAL

## 2019-07-02 VITALS
DIASTOLIC BLOOD PRESSURE: 95 MMHG | HEIGHT: 63 IN | HEART RATE: 94 BPM | BODY MASS INDEX: 39.87 KG/M2 | WEIGHT: 225 LBS | SYSTOLIC BLOOD PRESSURE: 154 MMHG

## 2019-07-02 PROCEDURE — 99214 OFFICE O/P EST MOD 30 MIN: CPT

## 2019-07-02 NOTE — DISCUSSION/SUMMARY
[de-identified] : I do think that this patient is a candidate for a C7-T1 ACDF. Pt wishes to continue conservative care at this point in time. Pt will be referred to pain management for cervical and lumbar injection therapy. The patient will follow up after 1-2 injections for a repeat clinical assessment. If she does not get significant improvement in her pain after epidural, we will again discuss C7-T1 ACDF.\par \par A long discussion was had with the patient regarding Cervical surgical plan of C7-T1 ACDF. Anatomic models, Xrays, CT scans/MRI’s were utilized to provide a firm understanding of their surgical plan. Patient is aware that surgery is elective in nature and she choosing to proceed with surgery. Risks, benefits, alternatives were discussed and all questions, comments and concerns were encouraged and answered to the patient's satisfaction. The statistical probability of improvement was discussed at length as well as post surgical course. Literature from [employer] spine society was provided to the patient regarding the specific type of surgery as well as a 5 page written surgical consent which the patient will need to sign and return to the office prior to surgical date. Consent forms highlight specific complications related to the complex nature of spinal surgery.\par  \par Risks of cervical surgery include: dysphagia/difficulty swallowing, Dysphonia/altered voice, adjacent segment disease (which will require more surgery in the future), vascular compromise and stroke, and persistent pain.\par  \par Benefits of cervical surgery include Improved neurologic function and pain score\par  \par We also discussed with the patient complications of incisions directly related to obesity, diabetes, previous wound complications or post-surgical wound infections, smoking, neuropathy, and chronic anticoagulation. This risk has been specifically discussed and the patient will discuss modifiable risk factors to be optimized prior to surgical management. A multimodality approach of primary care physician, and medicine subspecialists will be utilized to optimize medical risk factors.\par  \par If patient is a smoker, discontinuation of smoking was advised and must be accomplished 6-8 weeks prior to surgery date. Patient was advised that help with quitting smoking is available through [place] [employer] and phone number/website was provided, or patient can ask assistance from primary care provider. Elective surgery will not be performed unless patient complies with this policy.\par \par Medical comorbidities including but not limited to diabetes, coronary artery disease, renal insufficiency, uncontrolled hypertension, rheumatoid arthritis, auto-immune disorders, COPD/asthma and/or history of radiation, chemotherapy, being on anticoagulants, chronic steroids, immune modulators, have increased statistical chance of leading to increased hospital stay, protracted recovery period, need for acute or subacute rehabilitation post-operative. There can be increased probability of post-surgical and medical complications including but not limited to surgical site infection, need for revision surgery, deep vein thrombosis, pulmonary embolism, exacerbation of COPD/asthma, pneumonia, UTI, urinary retention, and ileus.

## 2019-07-02 NOTE — REVIEW OF SYSTEMS
[Joint Pain] : joint pain [Negative] : Endocrine [Fever] : no fever [Chills] : no chills [SOB on Exertion] : no shortness of breath on exertion [Cough] : no cough

## 2019-07-02 NOTE — ADDENDUM
[FreeTextEntry1] : Documented by Dragan Neville acting as a scribe for Dr. Bob Ahuja on 07/02/2019 . All medical record entries made by the Scribe were at my, Dr. Bob Ahuja, direction and personally dictated by me on 07/02/2019 . I have reviewed the chart and agree that the record accurately reflects my personal performance of the history, physical exam, assessment and plan. I have also personally directed, reviewed, and agreed with the chart.

## 2019-07-02 NOTE — PHYSICAL EXAM
[Poor Appearance] : well-appearing [Acute Distress] : not in acute distress [de-identified] : CONSTITUTIONAL: Patient is a very pleasant individual who is well-nourished and appears stated age. \par PSYCHIATRIC: Alert and oriented times three and in no apparent distress, and participates with orthopedic evaluation well.\par HEAD: Atraumatic and nonsyndromic in appearance.\par EENT: No thyromegaly, EOMI.\par RESPIRATORY: Respiratory rate is regular, not dyspneic on examination.\par LYMPHATICS: There is no cervical or axillary lymphadenopathy.\par INTEGUMENTARY: Skin is clean, dry, and intact about the bilateral upper extremities and cervical spine. \par VASCULAR: There is brisk capillary refill about the bilateral upper extremities and radial pulses are 2/4. \par NEUROLOGIC: Negative L'hirmitte, negative Spurling’s sign. There are no pathologic reflexes. Deep tendon reflexes are well-maintained at +2/4 of the bilateral upper extremities and are symmetric.\par MUSCULOSKELETAL: There is no visible muscular atrophy. Manual motor strength is well maintained in the bilateral upper extremities. Cervical range of motion is well maintained. The patient ambulates in a non-myelopathic manner. Normal secondary orthopaedic exam of bilateral shoulders, elbows and hands. Elbow flexion and extension, wrist extension, finger flexion and abduction are well maintained. \par \par primarily C8 radiculopathy on the left with wrist flexion weakness. [de-identified] : MRI of the cervical spine from 2/9/19 from [facility] shows C7-T1 left foraminal disc herniation which I think correlates with her presentation of a C8 radiculopathy very well.\par \par MRI of the lumbar spine taken at [facility] on 4/2019 shows multiple levels of spondylosis.

## 2019-07-02 NOTE — HISTORY OF PRESENT ILLNESS
[de-identified] : 64 year old F presents for MRI review. She went to acupuncture 2 weekends ago which provided great relief for 48 hours. Her pain radiates down her LUE and into her fingers. Pain relieved by lifting her arm over her head. She describes her low back pain as stiffness. [Ataxia] : no ataxia [Loss of Dexterity] : good dexterity [Urinary Ret.] : no urinary retention [Incontinence] : no incontinence

## 2019-08-02 ENCOUNTER — APPOINTMENT (OUTPATIENT)
Dept: ORTHOPEDIC SURGERY | Facility: CLINIC | Age: 64
End: 2019-08-02
Payer: COMMERCIAL

## 2019-08-02 VITALS
HEIGHT: 63 IN | WEIGHT: 224 LBS | SYSTOLIC BLOOD PRESSURE: 147 MMHG | HEART RATE: 96 BPM | DIASTOLIC BLOOD PRESSURE: 76 MMHG | BODY MASS INDEX: 39.69 KG/M2

## 2019-08-02 PROCEDURE — 99213 OFFICE O/P EST LOW 20 MIN: CPT

## 2019-08-02 PROCEDURE — 73562 X-RAY EXAM OF KNEE 3: CPT | Mod: LT

## 2019-08-02 NOTE — PHYSICAL EXAM
[Normal] : Gait: normal [LE] : 5/5 motor strength in bilateral lower extremities [ALL] : dorsalis pedis, posterior tibial, femoral, popliteal, and radial 2+ and symmetric bilaterally [de-identified] : On physical examination of the left knee. The skin is clean dry and intact with old surgical scar noted. There is no evidence of infection superficial or deep. Patient has good range of motion. No evidence of ligamentous laxity. There is some pain and tenderness on the lateral aspect which also radiates along the ITB band. Patient has good distal pulses. No evidence of any motor or sensory deficit. [de-identified] : X-rays of the left knee show status post left total knee replacement. The prosthesis is in place and shows excellent alignment as well as fixation. There is no evidence of any fracture dislocation or loosening of hardware.

## 2019-08-02 NOTE — HISTORY OF PRESENT ILLNESS
[de-identified] : Patient is a 64-year-old female who presents today for an evaluation of her left knee. She is status post left total replacement over a year ago in 2018. She states that following surgery she did fairly well however she is experiencing increased discomfort over the past several months. She states is a radiating pain that radiates along the lateral aspect of the knee and extends proximally as well as distally. She has been seen in the office in the past regarding this. I was advised an MRI. She states that she did do the MRI and presents today to us its findings as well as any further medical management. She denies any new or recent injury. However she does state that she fell while on a cruise in April/May. [4] : a maximum pain level of 4/10 [Stable] : stable [Walking] : walking [Standing] : standing

## 2019-08-02 NOTE — DISCUSSION/SUMMARY
[de-identified] : Plan for the patient is to continue with the present level of activities. She was explained she does have some ITB band syndrome. We'll benefit from physical therapy. Therefore she was given a prescription to attend therapy 2-3 times a week for the next 6-8 weeks. She was also advised to continue with the NSAIDs as well as exercise program at home and ice/moist heat. It was explained that if she does not do much of an improvement she can return to the office and possibly receive any cortisone injection. But to try his alternative measures first. She is also advised to follow with her back specialist to do a lower back condition as well as her neck.

## 2019-08-19 ENCOUNTER — OUTPATIENT (OUTPATIENT)
Dept: OUTPATIENT SERVICES | Facility: HOSPITAL | Age: 64
LOS: 1 days | End: 2019-08-19
Payer: COMMERCIAL

## 2019-08-19 ENCOUNTER — APPOINTMENT (OUTPATIENT)
Dept: CT IMAGING | Facility: CLINIC | Age: 64
End: 2019-08-19
Payer: COMMERCIAL

## 2019-08-19 DIAGNOSIS — Z00.8 ENCOUNTER FOR OTHER GENERAL EXAMINATION: ICD-10-CM

## 2019-08-19 DIAGNOSIS — Z87.19 PERSONAL HISTORY OF OTHER DISEASES OF THE DIGESTIVE SYSTEM: Chronic | ICD-10-CM

## 2019-08-19 DIAGNOSIS — Z87.39 PERSONAL HISTORY OF OTHER DISEASES OF THE MUSCULOSKELETAL SYSTEM AND CONNECTIVE TISSUE: Chronic | ICD-10-CM

## 2019-08-19 DIAGNOSIS — Z98.891 HISTORY OF UTERINE SCAR FROM PREVIOUS SURGERY: Chronic | ICD-10-CM

## 2019-08-19 DIAGNOSIS — Z98.890 OTHER SPECIFIED POSTPROCEDURAL STATES: Chronic | ICD-10-CM

## 2019-08-19 DIAGNOSIS — Z98.84 BARIATRIC SURGERY STATUS: Chronic | ICD-10-CM

## 2019-08-19 DIAGNOSIS — Z96.652 PRESENCE OF LEFT ARTIFICIAL KNEE JOINT: Chronic | ICD-10-CM

## 2019-08-19 DIAGNOSIS — Z90.49 ACQUIRED ABSENCE OF OTHER SPECIFIED PARTS OF DIGESTIVE TRACT: Chronic | ICD-10-CM

## 2019-08-19 PROCEDURE — 72125 CT NECK SPINE W/O DYE: CPT

## 2019-08-19 PROCEDURE — 72125 CT NECK SPINE W/O DYE: CPT | Mod: 26

## 2019-09-06 ENCOUNTER — APPOINTMENT (OUTPATIENT)
Dept: ORTHOPEDIC SURGERY | Facility: CLINIC | Age: 64
End: 2019-09-06
Payer: COMMERCIAL

## 2019-09-06 VITALS — SYSTOLIC BLOOD PRESSURE: 100 MMHG | HEART RATE: 71 BPM | DIASTOLIC BLOOD PRESSURE: 70 MMHG

## 2019-09-06 VITALS — WEIGHT: 224 LBS | HEIGHT: 63 IN | BODY MASS INDEX: 39.69 KG/M2

## 2019-09-06 PROCEDURE — 99213 OFFICE O/P EST LOW 20 MIN: CPT

## 2019-09-06 NOTE — PHYSICAL EXAM
[ALL] : dorsalis pedis, posterior tibial, femoral, popliteal, and radial 2+ and symmetric bilaterally [UE/LE] : Sensory: Intact in bilateral upper & lower extremities [Normal RUE] : Right Upper Extremity: No scars, rashes, lesions, ulcers, skin intact [Normal LUE] : Left Upper Extremity: No scars, rashes, lesions, ulcers, skin intact [Normal RLE] : Right Lower Extremity: No scars, rashes, lesions, ulcers, skin intact [Normal LLE] : Left Lower Extremity: No scars, rashes, lesions, ulcers, skin intact [Normal] : Oriented to person, place, and time, insight and judgement were intact and the affect was normal [de-identified] : Well-healed surgical scar to the left knee without erythema, drainage, or evidence of cellulitis. Less than 5° laxity with varus valgus stress. Negative anterior-posterior drawer. No extension lag. No flexion contractures. Range of motion 0-135. There is no crepitation at the patellofemoral joint with flexion and extension maneuvers. There is no subluxation of the patella with flexion and extension maneuvers. There were no defects noted in the quadriceps mechanism. Calves are soft, nontender, no evidence of DVT this time. Patient has good motor and sensory both legs.Severe pain over the distal insertion of the iliotibial band left side\par Pain over the greater trochanteric region and down the iliotibial band. Left side [de-identified] : Intact [de-identified] : Left total knee replacement, grossly in anatomical alignment, good bone to prosthesis interface, there is no gross evidence of prosthetic failure, all 3 components are anatomically aligned.\par \par \par MRI status post knee arthroplasty. No ostial lysis or periprosthetic fractures. Moderate knee joint effusion. Popliteal cyst treated mild stress reaction of the proximal tibial component

## 2019-09-06 NOTE — END OF VISIT
[FreeTextEntry2] : I saw and evaluated this patient. I discussed with the PA and agree with the PAs findings and plans as documented in the PAs note.\par \par ________________\par Deanne Harrington MD

## 2019-09-06 NOTE — REASON FOR VISIT
[Follow-Up Visit] : a follow-up visit for [FreeTextEntry2] : status post left total knee replacement. DOS: 05/24/18

## 2019-09-06 NOTE — PROCEDURE
[Injection] : Injection [Left] : of the left [Diagnostic] : Diagnostic [Inflammation] : Inflammation [Patient] : patient [Benefits] : benefits [Risk] : risk [Bleeding] : bleeding [Infection] : infection [Allergic Reaction] : allergic reaction [Ethyl Chloride Spray] : ethyl chloride spray was used as a topical anesthetic [22] : a 22-gauge [Verbal Consent Obtained] : verbal consent was obtained prior to the procedure [1% Lidocaine___(mL)] : [unfilled] mL of 1% Lidocaine [Tolerated Well] : The patient tolerated the procedure well [Methylpred. 40mg/mL___(mL)] : [unfilled] mL of 40mg/mL methylprednisolone [None] : none [PRN] : as needed [Iliotibial Band, Distal] : distal iliotibial band [FreeTextEntry1] : chlorhexidine

## 2019-09-06 NOTE — HISTORY OF PRESENT ILLNESS
[Stable] : stable [5] : an average pain level of 5/10 [6] : a current pain level of 6/10 [7] : a maximum pain level of 7/10 [Standing] : standing [Intermit.] : ~He/She~ states the symptoms seem to be intermittent [Direct Pressure] : worsened by direct pressure [Bending] : worsened by bending [Lifting] : worsened by lifting [Sitting] : worsened by sitting [Walking] : worsened by walking [de-identified] : The patient is a 64-year-old female presents today for reevaluation of her left knee. She status post a left total knee replacement done in 2018. The patient has been complaining of neuropathic pain to the left lower extremity for a period of time. The patient does have a history of cervical and lumbosacral issues and is seeing Dr. Lofton. The patient has done physical therapy, she is an exercise programs, been on anti-inflammatories and she still has these complaints. We ordered an MRI of her knee to rule out any periprosthetic issues . The MRI was negative for any significant periprosthetic issues such as osteolysis loosening, or fractures .The patient is here again for evaluation and to go over the studiesShe has been seeing a physical therapist who felt that there is no direct issues with her knee function. She is bending very well. She has no instability [Incontinence] : no incontinence [Ataxia] : no ataxia [Urinary Ret.] : no urinary retention [Loss of Dexterity] : good dexterity

## 2019-09-06 NOTE — DISCUSSION/SUMMARY
[Medication Risks Reviewed] : Medication risks reviewed [de-identified] : The patient was given a cortisone injection to be used for diagnostic purposes to see if her pain decreasesIn the distal insertion of the iliotibial band. She will continue with physical therapy and an exercise program of walking, and strength training. The patient is following up with the orthopedic spine specialist in the upcoming weeks.

## 2019-09-17 ENCOUNTER — APPOINTMENT (OUTPATIENT)
Dept: RHEUMATOLOGY | Facility: CLINIC | Age: 64
End: 2019-09-17

## 2019-10-04 ENCOUNTER — APPOINTMENT (OUTPATIENT)
Dept: ORTHOPEDIC SURGERY | Facility: CLINIC | Age: 64
End: 2019-10-04

## 2019-10-17 ENCOUNTER — CHART COPY (OUTPATIENT)
Age: 64
End: 2019-10-17

## 2019-10-21 ENCOUNTER — APPOINTMENT (OUTPATIENT)
Age: 64
End: 2019-10-21
Payer: COMMERCIAL

## 2019-10-21 VITALS
TEMPERATURE: 98.8 F | BODY MASS INDEX: 40.93 KG/M2 | SYSTOLIC BLOOD PRESSURE: 124 MMHG | HEIGHT: 63 IN | RESPIRATION RATE: 16 BRPM | HEART RATE: 101 BPM | DIASTOLIC BLOOD PRESSURE: 82 MMHG | WEIGHT: 231 LBS

## 2019-10-21 PROCEDURE — 99214 OFFICE O/P EST MOD 30 MIN: CPT

## 2019-10-21 RX ORDER — CALCITONIN SALMON 200 [IU]/1
200 SPRAY, METERED NASAL
Refills: 0 | Status: DISCONTINUED | COMMUNITY
End: 2019-10-21

## 2019-10-21 RX ORDER — AMOXICILLIN 500 MG/1
500 CAPSULE ORAL
Qty: 12 | Refills: 2 | Status: DISCONTINUED | COMMUNITY
Start: 2019-10-17 | End: 2019-10-21

## 2019-10-21 RX ORDER — DICLOFENAC SODIUM 10 MG/G
1 GEL TOPICAL
Qty: 2 | Refills: 0 | Status: DISCONTINUED | COMMUNITY
Start: 2019-05-03 | End: 2019-10-21

## 2019-10-21 RX ORDER — METHYLPREDNISOLONE 4 MG/1
4 TABLET ORAL
Qty: 1 | Refills: 0 | Status: DISCONTINUED | COMMUNITY
Start: 2019-06-13 | End: 2019-10-21

## 2019-10-21 RX ORDER — TRAMADOL HYDROCHLORIDE 50 MG/1
50 TABLET, COATED ORAL EVERY 8 HOURS
Qty: 15 | Refills: 0 | Status: DISCONTINUED | COMMUNITY
Start: 2019-06-13 | End: 2019-10-21

## 2019-10-21 NOTE — PHYSICAL EXAM
[Fully active, able to carry on all pre-disease performance without restriction] : Status 0 - Fully active, able to carry on all pre-disease performance without restriction [Obese] : obese [Normal] : normal appearance, no rash, nodules, vesicles, ulcers, erythema [de-identified] : chronic lower extremity edema

## 2019-10-21 NOTE — HISTORY OF PRESENT ILLNESS
[de-identified] : 64 F with stage II anal squamous cell carcinoma diagnosed 2015.\par \par CASE SYNOPSIS:\par 12/2014- to rectal pressure, bleeding, pain with defecation.\par 2/12/15- sigmoidoscopy: Mass in the proximal anal canal and distal rectum, occupying the anterior one third of the circumference.\par Biopsy: Squamous cell carcinoma.\par CT CAP: Enlarged perirectal lymph nodes; no distant metastases. \par 3/10/15- PET/CT: Few stop centimeter pelvic lymph nodes too small to characterize. \par Indeterminate hypermetabolic right inguinal lymph node.\par 3/16 - 5/2015: : concurrent chemotherapy/XRT with mitomycin C/5 FU.\par 8/10/15 - CT scan with stable lung nodules and resolution of perirectal lymphadenopathy.\par 9/29/17 - CT abdomen/pelvis- IZABELLA.\par 8/2018 - last colonoscopy  with Dr. Silvio Orlando- reportedly negative.\par  [FreeTextEntry1] : expectant surveillance\par \par  [de-identified] : Patient returning for followup; last seen in the office in April 2019. Denies changes in bowel movement habits and her hematologic results are within normal. She had previous episodes of acute diverticulitis, treated by GI with antibiotics. No recent flare reported. She is still complaining of lower extremity swelling. Her last CAT scan of the abdomen and pelvis was in September of 2017, showing no evidence of disease. Her last colonoscopy was a year ago with Dr. Orlando - also a negative study. Patient’s vitamin B12 level decreased, so she resumed oral B12 supplementation. Complains of short term memory loss, otherwise feeling fine.

## 2019-10-31 ENCOUNTER — TRANSCRIPTION ENCOUNTER (OUTPATIENT)
Age: 64
End: 2019-10-31

## 2019-11-06 ENCOUNTER — TRANSCRIPTION ENCOUNTER (OUTPATIENT)
Age: 64
End: 2019-11-06

## 2019-11-11 ENCOUNTER — FORM ENCOUNTER (OUTPATIENT)
Age: 64
End: 2019-11-11

## 2019-11-12 ENCOUNTER — APPOINTMENT (OUTPATIENT)
Dept: RADIOLOGY | Facility: CLINIC | Age: 64
End: 2019-11-12
Payer: COMMERCIAL

## 2019-11-12 ENCOUNTER — APPOINTMENT (OUTPATIENT)
Dept: MAMMOGRAPHY | Facility: CLINIC | Age: 64
End: 2019-11-12
Payer: COMMERCIAL

## 2019-11-12 ENCOUNTER — OUTPATIENT (OUTPATIENT)
Dept: OUTPATIENT SERVICES | Facility: HOSPITAL | Age: 64
LOS: 1 days | End: 2019-11-12
Payer: COMMERCIAL

## 2019-11-12 ENCOUNTER — APPOINTMENT (OUTPATIENT)
Dept: ULTRASOUND IMAGING | Facility: CLINIC | Age: 64
End: 2019-11-12
Payer: COMMERCIAL

## 2019-11-12 DIAGNOSIS — Z87.39 PERSONAL HISTORY OF OTHER DISEASES OF THE MUSCULOSKELETAL SYSTEM AND CONNECTIVE TISSUE: Chronic | ICD-10-CM

## 2019-11-12 DIAGNOSIS — Z90.49 ACQUIRED ABSENCE OF OTHER SPECIFIED PARTS OF DIGESTIVE TRACT: Chronic | ICD-10-CM

## 2019-11-12 DIAGNOSIS — Z98.891 HISTORY OF UTERINE SCAR FROM PREVIOUS SURGERY: Chronic | ICD-10-CM

## 2019-11-12 DIAGNOSIS — Z96.652 PRESENCE OF LEFT ARTIFICIAL KNEE JOINT: Chronic | ICD-10-CM

## 2019-11-12 DIAGNOSIS — Z87.19 PERSONAL HISTORY OF OTHER DISEASES OF THE DIGESTIVE SYSTEM: Chronic | ICD-10-CM

## 2019-11-12 DIAGNOSIS — Z98.890 OTHER SPECIFIED POSTPROCEDURAL STATES: Chronic | ICD-10-CM

## 2019-11-12 DIAGNOSIS — Z00.8 ENCOUNTER FOR OTHER GENERAL EXAMINATION: ICD-10-CM

## 2019-11-12 DIAGNOSIS — Z98.84 BARIATRIC SURGERY STATUS: Chronic | ICD-10-CM

## 2019-11-12 PROCEDURE — 76641 ULTRASOUND BREAST COMPLETE: CPT | Mod: 26,50

## 2019-11-12 PROCEDURE — 77063 BREAST TOMOSYNTHESIS BI: CPT | Mod: 26

## 2019-11-12 PROCEDURE — 77067 SCR MAMMO BI INCL CAD: CPT | Mod: 26

## 2019-11-12 PROCEDURE — 77067 SCR MAMMO BI INCL CAD: CPT

## 2019-11-12 PROCEDURE — 77080 DXA BONE DENSITY AXIAL: CPT

## 2019-11-12 PROCEDURE — 76641 ULTRASOUND BREAST COMPLETE: CPT

## 2019-11-12 PROCEDURE — 77080 DXA BONE DENSITY AXIAL: CPT | Mod: 26

## 2019-11-12 PROCEDURE — 77063 BREAST TOMOSYNTHESIS BI: CPT

## 2019-11-25 ENCOUNTER — RX RENEWAL (OUTPATIENT)
Age: 64
End: 2019-11-25

## 2019-11-26 ENCOUNTER — RX RENEWAL (OUTPATIENT)
Age: 64
End: 2019-11-26

## 2019-12-06 ENCOUNTER — APPOINTMENT (OUTPATIENT)
Dept: ORTHOPEDIC SURGERY | Facility: CLINIC | Age: 64
End: 2019-12-06
Payer: COMMERCIAL

## 2019-12-06 VITALS
HEART RATE: 83 BPM | HEIGHT: 63 IN | DIASTOLIC BLOOD PRESSURE: 72 MMHG | BODY MASS INDEX: 39.87 KG/M2 | SYSTOLIC BLOOD PRESSURE: 111 MMHG | WEIGHT: 225 LBS

## 2019-12-06 PROCEDURE — 99214 OFFICE O/P EST MOD 30 MIN: CPT

## 2019-12-06 PROCEDURE — 73562 X-RAY EXAM OF KNEE 3: CPT | Mod: LT

## 2019-12-06 RX ORDER — TIZANIDINE 2 MG/1
2 TABLET ORAL
Qty: 60 | Refills: 0 | Status: DISCONTINUED | COMMUNITY
Start: 2019-05-03 | End: 2019-12-06

## 2020-02-10 ENCOUNTER — APPOINTMENT (OUTPATIENT)
Dept: ORTHOPEDIC SURGERY | Facility: CLINIC | Age: 65
End: 2020-02-10
Payer: MEDICAID

## 2020-02-10 PROCEDURE — 99214 OFFICE O/P EST MOD 30 MIN: CPT | Mod: 25

## 2020-02-10 PROCEDURE — 20550 NJX 1 TENDON SHEATH/LIGAMENT: CPT | Mod: RT

## 2020-02-10 NOTE — PHYSICAL EXAM
[Normal Finger/nose] : finger to nose coordination [Normal] : no peripheral adenopathy appreciated [de-identified] : There is tenderness over the left basal joint with a positive grind test, no shoulder deformity. There is a positive crank test. There is swelling appreciated over the affected CMC joint which is not present on the opposite side. There is no evidence of infection or lymphadenopathy bilaterally to the level of the elbows There is no evidence of MCP hyperextension bilaterally. There is a negative Finkelstein's test There is no tenderness over the A-1 pulleys bilaterally. 5/5 stregnth bilaterally. \par \par The wrists have a symmetric and full range of motion bilaterally with no pain upon forced flexion, extension, pronation and supination. There is no tenderness over the scaphoid scapholunate region ligaments and no tenderness of the TFCC bilaterally. There is no tenderness of the pisotriquetral hamate hook. The second through fifth CMC his is stable and nontender bilaterally.\par There is a negative carpal tunnel compression test or Tinel's bilaterally.  \par \par Right middle finger:\par Skin intact no swelling no erythema no ecchymosis\par tenderness to palpation over the A1 pulley with active triggering on flexion and extension\par No other digits affected\par Full range of motion of the digits without pain\par sensation intact distally, capillary refill less than 2 seconds [de-identified] : tierar [de-identified] : 3 x-ray views of the left hand are reviewed there is mild joint space narrowing at the basal joint no other significant osseous abnormalities

## 2020-02-10 NOTE — ASSESSMENT
[FreeTextEntry1] : 64-year-old female with a right middle trigger finger as well as left thumb pain consistent with basal joint arthritis. She received an initial cortisone injection for the trigger finger today which she tolerated well. With regard to her thumb pain I recommend conservative treatment withanti-inflammatory medication as needed as well as a comfort cool brace for activity. She will followup as needed.

## 2020-02-10 NOTE — PROCEDURE
[FreeTextEntry1] : My impression is that this patient has stenosing tenosynovitis of the finger, more commonly known as a trigger finger.  I recommended that the patient undergo a trigger finger injection. The risks, benefits, and alternatives were discussed with the patient in detail. This included (but was not limited to) pain, infection, subcutaneous atrophy, skin depigmentation, etc... They agreed to undergo the steroid injection. Under informed consent and sterile conditions, 1/2 cc of 1% plain lidocaine  and 1 cc of Dexamethasone 4mg was precisely injected into the patient's right middle finger.   A sterile Band-Aid was placed.  It is my hope that this significantly alleviates the patient's symptoms.\par

## 2020-02-10 NOTE — HISTORY OF PRESENT ILLNESS
[FreeTextEntry1] : 64-year-old female presents for evaluation of pain at the base of the left thumb as well as a right middle trigger finger. Her symptoms have been present for several months she denies specific trauma.  Between the base of the thumb is gone aching in nature worse with activity and improved with rest. The pain radiates circumferentially above, as well as to the wrist. She also complains of dull pain at the A1 pulley of the right middle finger with locking of the PIP joint on flexion and extension.

## 2020-03-03 ENCOUNTER — OUTPATIENT (OUTPATIENT)
Dept: OUTPATIENT SERVICES | Facility: HOSPITAL | Age: 65
LOS: 1 days | Discharge: HOME | End: 2020-03-03

## 2020-03-03 DIAGNOSIS — Z98.890 OTHER SPECIFIED POSTPROCEDURAL STATES: Chronic | ICD-10-CM

## 2020-03-03 DIAGNOSIS — Z98.84 BARIATRIC SURGERY STATUS: Chronic | ICD-10-CM

## 2020-03-03 DIAGNOSIS — Z90.49 ACQUIRED ABSENCE OF OTHER SPECIFIED PARTS OF DIGESTIVE TRACT: Chronic | ICD-10-CM

## 2020-03-03 DIAGNOSIS — Z98.891 HISTORY OF UTERINE SCAR FROM PREVIOUS SURGERY: Chronic | ICD-10-CM

## 2020-03-03 DIAGNOSIS — Z87.39 PERSONAL HISTORY OF OTHER DISEASES OF THE MUSCULOSKELETAL SYSTEM AND CONNECTIVE TISSUE: Chronic | ICD-10-CM

## 2020-03-03 DIAGNOSIS — Z96.652 PRESENCE OF LEFT ARTIFICIAL KNEE JOINT: Chronic | ICD-10-CM

## 2020-03-03 DIAGNOSIS — E53.8 DEFICIENCY OF OTHER SPECIFIED B GROUP VITAMINS: ICD-10-CM

## 2020-03-03 DIAGNOSIS — Z87.19 PERSONAL HISTORY OF OTHER DISEASES OF THE DIGESTIVE SYSTEM: Chronic | ICD-10-CM

## 2020-03-17 RX ORDER — AMOXICILLIN 250 MG/5ML
1 SUSPENSION, RECONSTITUTED, ORAL (ML) ORAL
Qty: 0 | Refills: 0 | DISCHARGE
Start: 2020-03-17 | End: 2020-03-26

## 2020-03-19 ENCOUNTER — TRANSCRIPTION ENCOUNTER (OUTPATIENT)
Age: 65
End: 2020-03-19

## 2020-03-20 ENCOUNTER — TRANSCRIPTION ENCOUNTER (OUTPATIENT)
Age: 65
End: 2020-03-20

## 2020-03-23 ENCOUNTER — INPATIENT (INPATIENT)
Facility: HOSPITAL | Age: 65
LOS: 3 days | Discharge: HOME | DRG: 139 | End: 2020-03-27
Attending: FAMILY MEDICINE | Admitting: FAMILY MEDICINE
Payer: MEDICAID

## 2020-03-23 VITALS
RESPIRATION RATE: 19 BRPM | WEIGHT: 145.06 LBS | DIASTOLIC BLOOD PRESSURE: 84 MMHG | TEMPERATURE: 99 F | HEART RATE: 99 BPM | OXYGEN SATURATION: 94 % | SYSTOLIC BLOOD PRESSURE: 140 MMHG | HEIGHT: 63 IN

## 2020-03-23 DIAGNOSIS — Z87.19 PERSONAL HISTORY OF OTHER DISEASES OF THE DIGESTIVE SYSTEM: Chronic | ICD-10-CM

## 2020-03-23 DIAGNOSIS — Z98.890 OTHER SPECIFIED POSTPROCEDURAL STATES: Chronic | ICD-10-CM

## 2020-03-23 DIAGNOSIS — Z98.84 BARIATRIC SURGERY STATUS: Chronic | ICD-10-CM

## 2020-03-23 DIAGNOSIS — Z87.39 PERSONAL HISTORY OF OTHER DISEASES OF THE MUSCULOSKELETAL SYSTEM AND CONNECTIVE TISSUE: Chronic | ICD-10-CM

## 2020-03-23 DIAGNOSIS — Z96.652 PRESENCE OF LEFT ARTIFICIAL KNEE JOINT: Chronic | ICD-10-CM

## 2020-03-23 DIAGNOSIS — Z90.49 ACQUIRED ABSENCE OF OTHER SPECIFIED PARTS OF DIGESTIVE TRACT: Chronic | ICD-10-CM

## 2020-03-23 DIAGNOSIS — J06.9 ACUTE UPPER RESPIRATORY INFECTION, UNSPECIFIED: ICD-10-CM

## 2020-03-23 DIAGNOSIS — Z98.891 HISTORY OF UTERINE SCAR FROM PREVIOUS SURGERY: Chronic | ICD-10-CM

## 2020-03-23 LAB
ALBUMIN SERPL ELPH-MCNC: 3.3 G/DL — SIGNIFICANT CHANGE UP (ref 3.3–5)
ALP SERPL-CCNC: 98 U/L — SIGNIFICANT CHANGE UP (ref 40–120)
ALT FLD-CCNC: 24 U/L — SIGNIFICANT CHANGE UP (ref 12–78)
ANION GAP SERPL CALC-SCNC: 6 MMOL/L — SIGNIFICANT CHANGE UP (ref 5–17)
AST SERPL-CCNC: 15 U/L — SIGNIFICANT CHANGE UP (ref 15–37)
BASE EXCESS BLDV CALC-SCNC: -0.4 MMOL/L — SIGNIFICANT CHANGE UP (ref -2–2)
BASOPHILS # BLD AUTO: 0.01 K/UL — SIGNIFICANT CHANGE UP (ref 0–0.2)
BASOPHILS NFR BLD AUTO: 0.1 % — SIGNIFICANT CHANGE UP (ref 0–2)
BILIRUB SERPL-MCNC: 0.6 MG/DL — SIGNIFICANT CHANGE UP (ref 0.2–1.2)
BUN SERPL-MCNC: 15 MG/DL — SIGNIFICANT CHANGE UP (ref 7–23)
CALCIUM SERPL-MCNC: 9.3 MG/DL — SIGNIFICANT CHANGE UP (ref 8.5–10.1)
CHLORIDE SERPL-SCNC: 110 MMOL/L — HIGH (ref 96–108)
CO2 SERPL-SCNC: 25 MMOL/L — SIGNIFICANT CHANGE UP (ref 22–31)
CREAT SERPL-MCNC: 0.76 MG/DL — SIGNIFICANT CHANGE UP (ref 0.5–1.3)
CRP SERPL-MCNC: 9.7 MG/DL — HIGH (ref 0–0.4)
EOSINOPHIL # BLD AUTO: 0 K/UL — SIGNIFICANT CHANGE UP (ref 0–0.5)
EOSINOPHIL NFR BLD AUTO: 0 % — SIGNIFICANT CHANGE UP (ref 0–6)
ERYTHROCYTE [SEDIMENTATION RATE] IN BLOOD: 50 MM/HR — HIGH (ref 0–20)
GLUCOSE SERPL-MCNC: 100 MG/DL — HIGH (ref 70–99)
HCO3 BLDV-SCNC: 24 MMOL/L — SIGNIFICANT CHANGE UP (ref 21–29)
HCT VFR BLD CALC: 34.6 % — SIGNIFICANT CHANGE UP (ref 34.5–45)
HGB BLD-MCNC: 11.8 G/DL — SIGNIFICANT CHANGE UP (ref 11.5–15.5)
IMM GRANULOCYTES NFR BLD AUTO: 0.7 % — SIGNIFICANT CHANGE UP (ref 0–1.5)
LYMPHOCYTES # BLD AUTO: 0.88 K/UL — LOW (ref 1–3.3)
LYMPHOCYTES # BLD AUTO: 12.8 % — LOW (ref 13–44)
MCHC RBC-ENTMCNC: 27.8 PG — SIGNIFICANT CHANGE UP (ref 27–34)
MCHC RBC-ENTMCNC: 34.1 GM/DL — SIGNIFICANT CHANGE UP (ref 32–36)
MCV RBC AUTO: 81.6 FL — SIGNIFICANT CHANGE UP (ref 80–100)
MONOCYTES # BLD AUTO: 0.61 K/UL — SIGNIFICANT CHANGE UP (ref 0–0.9)
MONOCYTES NFR BLD AUTO: 8.9 % — SIGNIFICANT CHANGE UP (ref 2–14)
NEUTROPHILS # BLD AUTO: 5.31 K/UL — SIGNIFICANT CHANGE UP (ref 1.8–7.4)
NEUTROPHILS NFR BLD AUTO: 77.5 % — HIGH (ref 43–77)
PCO2 BLDV: 40 MMHG — SIGNIFICANT CHANGE UP (ref 35–50)
PH BLDV: 7.39 — SIGNIFICANT CHANGE UP (ref 7.35–7.45)
PLATELET # BLD AUTO: 184 K/UL — SIGNIFICANT CHANGE UP (ref 150–400)
PO2 BLDV: 105 MMHG — HIGH (ref 25–45)
POTASSIUM SERPL-MCNC: 3.7 MMOL/L — SIGNIFICANT CHANGE UP (ref 3.5–5.3)
POTASSIUM SERPL-SCNC: 3.7 MMOL/L — SIGNIFICANT CHANGE UP (ref 3.5–5.3)
PROT SERPL-MCNC: 6.9 GM/DL — SIGNIFICANT CHANGE UP (ref 6–8.3)
RBC # BLD: 4.24 M/UL — SIGNIFICANT CHANGE UP (ref 3.8–5.2)
RBC # FLD: 12.8 % — SIGNIFICANT CHANGE UP (ref 10.3–14.5)
SAO2 % BLDV: 97 % — HIGH (ref 67–88)
SODIUM SERPL-SCNC: 141 MMOL/L — SIGNIFICANT CHANGE UP (ref 135–145)
WBC # BLD: 6.86 K/UL — SIGNIFICANT CHANGE UP (ref 3.8–10.5)
WBC # FLD AUTO: 6.86 K/UL — SIGNIFICANT CHANGE UP (ref 3.8–10.5)

## 2020-03-23 PROCEDURE — 86803 HEPATITIS C AB TEST: CPT

## 2020-03-23 PROCEDURE — 93010 ELECTROCARDIOGRAM REPORT: CPT

## 2020-03-23 PROCEDURE — 93005 ELECTROCARDIOGRAM TRACING: CPT

## 2020-03-23 PROCEDURE — 71045 X-RAY EXAM CHEST 1 VIEW: CPT | Mod: 26

## 2020-03-23 PROCEDURE — 94640 AIRWAY INHALATION TREATMENT: CPT

## 2020-03-23 PROCEDURE — 36415 COLL VENOUS BLD VENIPUNCTURE: CPT

## 2020-03-23 PROCEDURE — 83036 HEMOGLOBIN GLYCOSYLATED A1C: CPT

## 2020-03-23 PROCEDURE — 84484 ASSAY OF TROPONIN QUANT: CPT

## 2020-03-23 PROCEDURE — 99223 1ST HOSP IP/OBS HIGH 75: CPT

## 2020-03-23 RX ORDER — TIOTROPIUM BROMIDE 18 UG/1
1 CAPSULE ORAL; RESPIRATORY (INHALATION) ONCE
Refills: 0 | Status: COMPLETED | OUTPATIENT
Start: 2020-03-23 | End: 2020-03-23

## 2020-03-23 RX ORDER — ALBUTEROL 90 UG/1
2 AEROSOL, METERED ORAL ONCE
Refills: 0 | Status: COMPLETED | OUTPATIENT
Start: 2020-03-23 | End: 2020-03-23

## 2020-03-23 RX ORDER — ENOXAPARIN SODIUM 100 MG/ML
40 INJECTION SUBCUTANEOUS AT BEDTIME
Refills: 0 | Status: DISCONTINUED | OUTPATIENT
Start: 2020-03-23 | End: 2020-03-27

## 2020-03-23 RX ORDER — CEFTRIAXONE 500 MG/1
1000 INJECTION, POWDER, FOR SOLUTION INTRAMUSCULAR; INTRAVENOUS ONCE
Refills: 0 | Status: DISCONTINUED | OUTPATIENT
Start: 2020-03-23 | End: 2020-03-24

## 2020-03-23 RX ORDER — CEFTRIAXONE 500 MG/1
1000 INJECTION, POWDER, FOR SOLUTION INTRAMUSCULAR; INTRAVENOUS EVERY 24 HOURS
Refills: 0 | Status: DISCONTINUED | OUTPATIENT
Start: 2020-03-23 | End: 2020-03-23

## 2020-03-23 RX ORDER — AZITHROMYCIN 500 MG/1
500 TABLET, FILM COATED ORAL DAILY
Refills: 0 | Status: DISCONTINUED | OUTPATIENT
Start: 2020-03-24 | End: 2020-03-24

## 2020-03-23 RX ADMIN — Medication 40 MILLIGRAM(S): at 13:26

## 2020-03-23 RX ADMIN — TIOTROPIUM BROMIDE 1 CAPSULE(S): 18 CAPSULE ORAL; RESPIRATORY (INHALATION) at 14:01

## 2020-03-23 RX ADMIN — ALBUTEROL 2 PUFF(S): 90 AEROSOL, METERED ORAL at 13:26

## 2020-03-23 NOTE — ED ADULT NURSE NOTE - OBJECTIVE STATEMENT
Patient comes in with increasing shortness of breath. Patient was tested for COVID-19 on Wednesday which came back +. Patient was also told that she has bilateral pneumonia. Patient states she was feeling better until 0300 this morning when she had increasing SOB. Patient currently denying chest pain.

## 2020-03-23 NOTE — H&P ADULT - NSICDXPASTSURGICALHX_GEN_ALL_CORE_FT
PAST SURGICAL HISTORY:  H/O hiatal hernia     H/O total knee replacement, left     H/O umbilical hernia repair     History of  section X2    .    History of trigger finger right    S/P cholecystectomy     S/P left knee arthroscopy     Status post gastric banding band removed and sleeve procedure done

## 2020-03-23 NOTE — ED PROVIDER NOTE - OBJECTIVE STATEMENT
65 y.o. female PMH hypothyroidism p/w shortness of breath in setting of recent Covid dx.  She went to Urgent Care 3/18 where they did CXR, diagnosed her with PNA, and started doxy, prednisone, home news albuterol nebs, and tested her for covid.  The test came back yesterday positive.  She has been using the nebulizer at home with her family.  Fevers are helped with tylenol.  No nausea or myalgias.  She describes loss of taste. 65 y.o. female PMH hypothyroidism p/w shortness of breath in setting of recent Covid dx.  She went to Urgent Care 3/18 where they did CXR, diagnosed her with PNA, and started doxy, prednisone, home news albuterol nebs, and tested her for covid.  The test came back yesterday positive.  She has been using the nebulizer at home with her family, but her shortness of breath has gotten worse this morning despite this.  Fevers are present, but helped with tylenol.  No nausea or myalgias.  She describes loss of taste.

## 2020-03-23 NOTE — ED PROVIDER NOTE - PHYSICAL EXAMINATION
Gen: NAD, alert and oriented, breathing comfortably on RA  HEENT:  MMM, sclera clear  Heart:  RRR, no M/R/G  Lung:  CTA, no wheeze or rales, poor air entry, no accessory muscle use  Abd:  ND, soft, NT  Ext:  No edema or joint swelling  Skin:  No rash or ecchymosis  Neuro:  Nonfocal

## 2020-03-23 NOTE — ED ADULT TRIAGE NOTE - CHIEF COMPLAINT QUOTE
Pt came to the ED for evaluation of increasingly worse SOB. Pt was diagnosed with COVID-19 yesterday and was instructed by attending to return to the hospital if she feels more SOB. Pt became very SOB at 3am today. Pt 94% on RA as reported by EMS with +subjective fevers.

## 2020-03-23 NOTE — H&P ADULT - NSICDXFAMILYHX_GEN_ALL_CORE_FT
FAMILY HISTORY:  Family history of Alzheimer's disease  Family history of diabetes mellitus  Family history of hypertension

## 2020-03-23 NOTE — H&P ADULT - ASSESSMENT
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  Fevers  Bilateral PNA  - s/p   - cont nc O2  - discont nebs, prednisone from home  - consider adding Hydroxychloroquine 400mg po Q 12Hx2 doses, then 12 hrs later 200 mg Q12hr x 4days  - add statin  - ID consult  - DVT prophylaxis : heparin  - IMPROVE VTE Individual Risk Assessment    RISK                                                                Points    [  ] Previous VTE                                                  3    [  ] Thrombophilia                                               2    [  ] Lower limb paralysis                                      2        (unable to hold up >15 seconds)      [  ] Current Cancer                                              2         (within 6 months)    [ X ] Immobilization > 24 hrs                                1    [  ] ICU/CCU stay > 24 hours                              1    [ X ] Age > 60                                                      1    IMPROVE VTE Score ____2_____    IMPROVE Score 0-1: Low Risk, No VTE prophylaxis required for most patients, encourage ambulation.   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 88% on RA  Fever  Bilateral PNA  GIOVANNA  - s/p 40mg Solumedrol in the ED  and albuterol MDI, cont  - cont nc O2  - discont nebs from home, also discont CPAP  - discussed with pt OFF Label use of Hydroxychloroquine,  pt would like to try it  - will  add Hydroxychloroquine 400mg po Q 12Hx2 doses, then 12 hrs later 200 mg Q12hr x 4days  - add statin  - check HgA1C, troponin  - ID consult  - DVT prophylaxis : lovenox  - IMPROVE VTE Individual Risk Assessment    RISK                                                                Points    [  ] Previous VTE                                                  3    [  ] Thrombophilia                                               2    [  ] Lower limb paralysis                                      2        (unable to hold up >15 seconds)      [  ] Current Cancer                                              2         (within 6 months)    [ X ] Immobilization > 24 hrs                                1    [  ] ICU/CCU stay > 24 hours                              1    [ X ] Age > 60                                                      1    IMPROVE VTE Score ____2_____    IMPROVE Score 0-1: Low Risk, No VTE prophylaxis required for most patients, encourage ambulation.   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 88% on RA  Fever  Bilateral PNA  GIOVANNA  - s/p 40mg Solumedrol , albuterol MDI, spiriva in the ED,  will cont albuterol MDI, spiriva and 1 dose Solumedrol in the AM  - cont nc O2  - discont nebs from home, also discont CPAP  - discussed with pt OFF Label use of Hydroxychloroquine,  pt would like to try it  - will  add Hydroxychloroquine 400mg po Q 12Hx2 doses, then 12 hrs later 200 mg Q12hr x 4days  - will add statin  - check HgA1C, troponin  - ID consult  - DVT prophylaxis : lovenox  - IMPROVE VTE Individual Risk Assessment    RISK                                                                Points    [  ] Previous VTE                                                  3    [  ] Thrombophilia                                               2    [  ] Lower limb paralysis                                      2        (unable to hold up >15 seconds)      [  ] Current Cancer                                              2         (within 6 months)    [ X ] Immobilization > 24 hrs                                1    [  ] ICU/CCU stay > 24 hours                              1    [ X ] Age > 60                                                      1    IMPROVE VTE Score ____2_____    IMPROVE Score 0-1: Low Risk, No VTE prophylaxis required for most patients, encourage ambulation.   IMPROVE Score 2-3: At risk, pharmacologic VTE prophylaxis is indicated for most patients (in the absence of a contraindication)  IMPROVE Score > or = 4: High Risk, pharmacologic VTE prophylaxis is indicated for most patients (in the absence of a contraindication)

## 2020-03-23 NOTE — ED PROVIDER NOTE - CLINICAL SUMMARY MEDICAL DECISION MAKING FREE TEXT BOX
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  Inhaler, CXR, reassess, low threshold for admit.

## 2020-03-23 NOTE — H&P ADULT - REASON FOR ADMISSION
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

## 2020-03-23 NOTE — H&P ADULT - HISTORY OF PRESENT ILLNESS
Pt is a 66 y/o. F with  PMHx hypothyroidism c/o increasing shortness of breath with activity, fever and recent PNA , COVID positive .  She was diagnosed with pneumonia on   3/18/20 after an CXR and COVID testing was sent.  Pt has been on  Doxy, prednisone, tylenol,  albuterol nebs.  Yesterday she received the positive COVID-19 test.  She has been using the nebulizer at home with her family.      Pt denies nausea or myalgias,  she reported loss of taste. Pt is a 66 y/o F w/  PMHx hypothyroidism,  Rectal ca in 2016 s/p Chemo, Obesity,  GIOVANNA on CPAP, Pna in Dec 2019 who   c/o fever,  increasing SOB ,  and recent outpatient PNA , COVID positive.  Pt was diagnosed with pneumonia on   3/18/20 after an CXR and COVID testing was sent.  Today is her 5th day of  doxycycline.  She saw Dr. Roach , three days ago and was started on prednisone, tylenol, and albuterol nebs.  Yesterday she was notified of her positive COVID-19 test.   Pt reports this morning she woke from 3am-6am with SOB and severe cough.  Her O2 sat at home was 88%,  she took her albuterol nebs and the saturation improved to 92%.  Pt reports a fever of 102F today and increasing SOB with activity and discomfort in her chest assoc with SOB and cough with yellowish sputum.  She denies nausea or myalgias or abd pain.  Pt reports a  loss of taste for the last 2 days. Pt is a 64 y/o F w/  PMHx hypothyroidism,  Rectal ca in 2016 s/p Chemo, Obesity,  GIOVANNA on CPAP, Pna in Dec 2019 who   c/o fever,  increasing SOB ,  and recent outpatient PNA , COVID positive.  Pt was diagnosed with pneumonia on   3/18/20 after an CXR and COVID testing was sent.  Initially she was given amoxicillin and it was switched to doxycycline. Today is her 5th day of  doxycycline.  She saw Dr. Roach , three days ago and was started on prednisone, tylenol, and albuterol nebs.  Yesterday she was notified of her positive COVID-19 test.   Pt reports this morning she woke from 3am-6am with SOB and severe cough.  Her O2 sat at home was 88%,  she took her albuterol nebs and the saturation improved to 92%.  Pt reports a fever of 102F today and increasing SOB with activity and discomfort in her chest assoc with SOB and cough with yellowish sputum.  She denies nausea or myalgias or abd pain.  Pt reports a  loss of taste for the last 2 days.

## 2020-03-23 NOTE — H&P ADULT - NSICDXPASTMEDICALHX_GEN_ALL_CORE_FT
PAST MEDICAL HISTORY:  Arthritis     Degenerative disc disease, lumbar     Gastroesophageal reflux disease, esophagitis presence not specified     Knee pain, bilateral left worse    Lung nodule     Obesity     Rectal cancer 2016 chemo radiation    S/P bariatric surgery sleeve gastrectomy    Sleep apnea cpap  pressure at 7    Thrombocytopenia while on chemo    Vitamin D deficiency

## 2020-03-23 NOTE — H&P ADULT - NSHPSOCIALHISTORY_GEN_ALL_CORE
Pt lives with her asthmatic daughter and son-in-law who have twin grandchildren aged 4.    Pt works as a .  She smoked for 10-15 pack years and quit >20 yrs ago.   She reports occas ETOH.  No drug use.

## 2020-03-23 NOTE — H&P ADULT - NSHPPHYSICALEXAM_GEN_ALL_CORE
ICU Vital Signs Last 24 Hrs  T(C): 37 (23 Mar 2020 11:13), Max: 37 (23 Mar 2020 11:13)  T(F): 98.6 (23 Mar 2020 11:13), Max: 98.6 (23 Mar 2020 11:13)  HR: 89 (23 Mar 2020 13:59) (89 - 99)  BP: 140/84 (23 Mar 2020 11:13) (140/84 - 140/84)  BP(mean): 99 (23 Mar 2020 11:13) (99 - 99)    RR: 19 (23 Mar 2020 11:13) (19 - 19)  SpO2: 97% (23 Mar 2020 13:59) (94% - 97%)

## 2020-03-23 NOTE — ED PROVIDER NOTE - PROGRESS NOTE DETAILS
Yong:  Patient dyspneic on exertion and hypoxic while walking to 87%. Pt with worsening SOB.  Sats 92-95%.  No significant improvement on reassessment.  Would admit for further monitoring.  Further care per inpatient treatment team.

## 2020-03-23 NOTE — ED PROVIDER NOTE - ATTENDING CONTRIBUTION TO CARE
I evaluated patient in concert with resident and agree with resident's hpi/physical exam/ros and assessment and plan.

## 2020-03-24 LAB
HBA1C BLD-MCNC: 5.4 % — SIGNIFICANT CHANGE UP (ref 4–5.6)
HCV AB S/CO SERPL IA: 0.27 S/CO — SIGNIFICANT CHANGE UP (ref 0–0.99)
HCV AB SERPL-IMP: SIGNIFICANT CHANGE UP
TROPONIN I SERPL-MCNC: <0.015 NG/ML — SIGNIFICANT CHANGE UP (ref 0.01–0.04)

## 2020-03-24 PROCEDURE — 99232 SBSQ HOSP IP/OBS MODERATE 35: CPT

## 2020-03-24 PROCEDURE — 93010 ELECTROCARDIOGRAM REPORT: CPT

## 2020-03-24 RX ORDER — LEVOTHYROXINE SODIUM 125 MCG
25 TABLET ORAL DAILY
Refills: 0 | Status: DISCONTINUED | OUTPATIENT
Start: 2020-03-24 | End: 2020-03-27

## 2020-03-24 RX ORDER — AZITHROMYCIN 500 MG/1
TABLET, FILM COATED ORAL
Refills: 0 | Status: DISCONTINUED | OUTPATIENT
Start: 2020-03-24 | End: 2020-03-24

## 2020-03-24 RX ORDER — CEFTRIAXONE 500 MG/1
1000 INJECTION, POWDER, FOR SOLUTION INTRAMUSCULAR; INTRAVENOUS EVERY 24 HOURS
Refills: 0 | Status: COMPLETED | OUTPATIENT
Start: 2020-03-25 | End: 2020-03-27

## 2020-03-24 RX ORDER — CEFTRIAXONE 500 MG/1
INJECTION, POWDER, FOR SOLUTION INTRAMUSCULAR; INTRAVENOUS
Refills: 0 | Status: COMPLETED | OUTPATIENT
Start: 2020-03-24 | End: 2020-03-27

## 2020-03-24 RX ORDER — CEFTRIAXONE 500 MG/1
1000 INJECTION, POWDER, FOR SOLUTION INTRAMUSCULAR; INTRAVENOUS ONCE
Refills: 0 | Status: COMPLETED | OUTPATIENT
Start: 2020-03-24 | End: 2020-03-24

## 2020-03-24 RX ORDER — HYDROXYCHLOROQUINE SULFATE 200 MG
200 TABLET ORAL EVERY 12 HOURS
Refills: 0 | Status: DISCONTINUED | OUTPATIENT
Start: 2020-03-24 | End: 2020-03-24

## 2020-03-24 RX ORDER — ATORVASTATIN CALCIUM 80 MG/1
20 TABLET, FILM COATED ORAL AT BEDTIME
Refills: 0 | Status: DISCONTINUED | OUTPATIENT
Start: 2020-03-24 | End: 2020-03-27

## 2020-03-24 RX ORDER — HYDROXYCHLOROQUINE SULFATE 200 MG
400 TABLET ORAL EVERY 12 HOURS
Refills: 0 | Status: COMPLETED | OUTPATIENT
Start: 2020-03-24 | End: 2020-03-24

## 2020-03-24 RX ORDER — HYDROXYCHLOROQUINE SULFATE 200 MG
TABLET ORAL
Refills: 0 | Status: DISCONTINUED | OUTPATIENT
Start: 2020-03-24 | End: 2020-03-24

## 2020-03-24 RX ORDER — ALBUTEROL 90 UG/1
2 AEROSOL, METERED ORAL EVERY 6 HOURS
Refills: 0 | Status: DISCONTINUED | OUTPATIENT
Start: 2020-03-24 | End: 2020-03-27

## 2020-03-24 RX ORDER — AZITHROMYCIN 500 MG/1
500 TABLET, FILM COATED ORAL ONCE
Refills: 0 | Status: COMPLETED | OUTPATIENT
Start: 2020-03-24 | End: 2020-03-24

## 2020-03-24 RX ORDER — AZITHROMYCIN 500 MG/1
500 TABLET, FILM COATED ORAL DAILY
Refills: 0 | Status: DISCONTINUED | OUTPATIENT
Start: 2020-03-24 | End: 2020-03-25

## 2020-03-24 RX ORDER — INFLUENZA VIRUS VACCINE 15; 15; 15; 15 UG/.5ML; UG/.5ML; UG/.5ML; UG/.5ML
0.5 SUSPENSION INTRAMUSCULAR ONCE
Refills: 0 | Status: DISCONTINUED | OUTPATIENT
Start: 2020-03-24 | End: 2020-03-27

## 2020-03-24 RX ORDER — TIOTROPIUM BROMIDE 18 UG/1
1 CAPSULE ORAL; RESPIRATORY (INHALATION) DAILY
Refills: 0 | Status: DISCONTINUED | OUTPATIENT
Start: 2020-03-24 | End: 2020-03-27

## 2020-03-24 RX ORDER — PANTOPRAZOLE SODIUM 20 MG/1
40 TABLET, DELAYED RELEASE ORAL
Refills: 0 | Status: DISCONTINUED | OUTPATIENT
Start: 2020-03-24 | End: 2020-03-27

## 2020-03-24 RX ORDER — HYDROXYCHLOROQUINE SULFATE 200 MG
200 TABLET ORAL EVERY 12 HOURS
Refills: 0 | Status: DISCONTINUED | OUTPATIENT
Start: 2020-03-24 | End: 2020-03-27

## 2020-03-24 RX ADMIN — Medication 200 MILLIGRAM(S): at 21:30

## 2020-03-24 RX ADMIN — ATORVASTATIN CALCIUM 20 MILLIGRAM(S): 80 TABLET, FILM COATED ORAL at 22:22

## 2020-03-24 RX ADMIN — ENOXAPARIN SODIUM 40 MILLIGRAM(S): 100 INJECTION SUBCUTANEOUS at 21:30

## 2020-03-24 RX ADMIN — ALBUTEROL 2 PUFF(S): 90 AEROSOL, METERED ORAL at 06:21

## 2020-03-24 RX ADMIN — Medication 400 MILLIGRAM(S): at 12:27

## 2020-03-24 RX ADMIN — Medication 40 MILLIGRAM(S): at 09:29

## 2020-03-24 RX ADMIN — PANTOPRAZOLE SODIUM 40 MILLIGRAM(S): 20 TABLET, DELAYED RELEASE ORAL at 05:25

## 2020-03-24 RX ADMIN — Medication 400 MILLIGRAM(S): at 02:26

## 2020-03-24 RX ADMIN — CEFTRIAXONE 1000 MILLIGRAM(S): 500 INJECTION, POWDER, FOR SOLUTION INTRAMUSCULAR; INTRAVENOUS at 09:29

## 2020-03-24 RX ADMIN — AZITHROMYCIN 255 MILLIGRAM(S): 500 TABLET, FILM COATED ORAL at 09:29

## 2020-03-24 RX ADMIN — Medication 25 MICROGRAM(S): at 05:25

## 2020-03-24 NOTE — PROGRESS NOTE ADULT - ASSESSMENT
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

## 2020-03-24 NOTE — CONSULT NOTE ADULT - ASSESSMENT
Pt is a 64 y/o F w/  PMHx hypothyroidism,  Rectal ca in 2016 s/p Chemo, Obesity,  GIOVANNA on CPAP, Pna in Dec 2019 admitted on 3/23 for evaluation of fever, shortness of breath; was diagnosed as outpatient with pneumonia and found to be COVID-19 positive; was started on po doxycycline but her cough progressed and she felt more short of breath as well as more weak. 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  1. Patient admitted with pneumonia which will treat as community acquired pneumonia; also with viral syndrome secondary to COVID-19   - follow up cultures   - serial cbc and monitor temperature   - reviewed prior medical records to evaluate for resistant or atypical pathogens   - iv hydration and supportive care   - oxygen and nebs as needed   - agree with ceftriaxone and zithromax  - on hydroxychloroquine  - continue statin and isolation  2. other issues; per medicine

## 2020-03-24 NOTE — PATIENT PROFILE ADULT - STATED REASON FOR ADMISSION
Was recently diagnosed with pneumonia, was tested for COVID which came back positive. Patient complained of difficulty breathing and fever.

## 2020-03-24 NOTE — CONSULT NOTE ADULT - SUBJECTIVE AND OBJECTIVE BOX
Patient is a 65y old  Female who presents with a chief complaint of COVID-19  MILLER  Hypoxia  Fevers  Bilateral PNA  GIOVANNA  Asthma (23 Mar 2020 17:52)    HPI:  Pt is a 66 y/o F w/  PMHx hypothyroidism,  Rectal ca in 2016 s/p Chemo, Obesity,  GIOVANNA on CPAP, Pna in Dec 2019 admitted on 3/23 for evaluation of fever, shortness of breath; was diagnosed as outpatient with pneumonia and found to be COVID-19 positive; was started on po doxycycline but her cough progressed and she felt more short of breath as well as more weak. Works as [employer] and no known sick contacts and no recent travel.        PMH: as above  PSH: as above  Meds: per reconciliation sheet, noted below  MEDICATIONS  (STANDING):  atorvastatin 20 milliGRAM(s) Oral at bedtime  azithromycin   Tablet 500 milliGRAM(s) Oral daily  cefTRIAXone Injectable.      enoxaparin Injectable 40 milliGRAM(s) SubCutaneous at bedtime  hydroxychloroquine   Oral   hydroxychloroquine 200 milliGRAM(s) Oral every 12 hours  influenza   Vaccine 0.5 milliLiter(s) IntraMuscular once  levothyroxine 25 MICROGram(s) Oral daily  pantoprazole    Tablet 40 milliGRAM(s) Oral before breakfast  tiotropium 18 MICROgram(s) Capsule 1 Capsule(s) Inhalation daily    MEDICATIONS  (PRN):  ALBUTerol    90 MICROgram(s) HFA Inhaler 2 Puff(s) Inhalation every 6 hours PRN Shortness of Breath and/or Wheezing  artificial  tears Solution 1 Drop(s) Both EYES two times a day PRN for dry eyes    Allergies    No Known Allergies    Intolerances      Social: no smoking, no alcohol, no illegal drugs; no recent travel, no exposure to TB  FAMILY HISTORY:  Family history of hypertension  Family history of Alzheimer's disease  Family history of diabetes mellitus     no history of premature cardiovascular disease in first degree relatives  ROS: the patient has no chills, no HA, no dizziness, no sore throat, no blurry vision, no CP, no palpitations,  no abdominal pain, no diarrhea, no N/V, no dysuria, no leg pain, no claudication, no rash, no joint aches, no rectal pain or bleeding, no night sweats  All other systems reviewed and are negative    Vital Signs Last 24 Hrs  T(C): 37 (24 Mar 2020 09:47), Max: 37.1 (23 Mar 2020 19:19)  T(F): 98.6 (24 Mar 2020 09:47), Max: 98.8 (23 Mar 2020 19:19)  HR: 80 (24 Mar 2020 09:47) (69 - 89)  BP: 150/80 (24 Mar 2020 09:47) (130/69 - 157/82)  BP(mean): --  RR: 18 (24 Mar 2020 09:47) (18 - 19)  SpO2: 95% (24 Mar 2020 09:47) (92% - 97%)  Daily     Daily     PE:    Constitutional: frail looking  HEENT: NC/AT, EOMI, PERRLA, conjunctivae clear; ears and nose atraumatic; pharynx clear  Neck: supple; thyroid not palpable  Back: no tenderness  Respiratory: respiratory effort normal; clear to auscultation with bibasilar rales  Cardiovascular: S1S2 regular, no murmurs  Abdomen: soft, not tender, not distended, positive BS; no liver or spleen organomegaly  Genitourinary: no suprapubic tenderness  Musculoskeletal: no muscle tenderness, no joint swelling or tenderness; mild peripheral edema  Neurological/ Psychiatric: AxOx3, judgement and insight normal;  moving all extremities  Skin: no rashes; no palpable lesions    Labs: all available labs reviewed                        11.8   6.86  )-----------( 184      ( 23 Mar 2020 13:23 )             34.6     03-23    141  |  110<H>  |  15  ----------------------------<  100<H>  3.7   |  25  |  0.76    Ca    9.3      23 Mar 2020 13:23    TPro  6.9  /  Alb  3.3  /  TBili  0.6  /  DBili  x   /  AST  15  /  ALT  24  /  AlkPhos  98  03-23     LIVER FUNCTIONS - ( 23 Mar 2020 13:23 )  Alb: 3.3 g/dL / Pro: 6.9 gm/dL / ALK PHOS: 98 U/L / ALT: 24 U/L / AST: 15 U/L / GGT: x             outpatient COVID-19 positive    < from: Xray Chest 1 View- PORTABLE-Urgent (03.23.20 @ 13:15) >    EXAM:  XR CHEST PORTABLE URGENT 1V                            PROCEDURE DATE:  03/23/2020          INTERPRETATION:  Clinical history: Shortness of breath    Comparison is made to priors    Vague opacity of the right upper lung is stable from March 19. There is question of developing infiltrate of the left midlung. Heart and mediastinum are stable. No pleural effusions.    Impression: Subtle infiltrates bilaterally. Underlying pneumonia including viral pneumonia not excluded      < end of copied text >        Radiology: all available radiological tests reviewed    Advanced directives addressed: full resuscitation

## 2020-03-24 NOTE — PROGRESS NOTE ADULT - SUBJECTIVE AND OBJECTIVE BOX
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    ROS:  all other review of systems are negative unless indicated above.    ALBUTerol    90 MICROgram(s) HFA Inhaler 2 Puff(s) Inhalation every 6 hours PRN  artificial  tears Solution 1 Drop(s) Both EYES two times a day PRN  atorvastatin 20 milliGRAM(s) Oral at bedtime  azithromycin   Tablet 500 milliGRAM(s) Oral daily  cefTRIAXone Injectable.      enoxaparin Injectable 40 milliGRAM(s) SubCutaneous at bedtime  hydroxychloroquine 200 milliGRAM(s) Oral every 12 hours  influenza   Vaccine 0.5 milliLiter(s) IntraMuscular once  levothyroxine 25 MICROGram(s) Oral daily  pantoprazole    Tablet 40 milliGRAM(s) Oral before breakfast  tiotropium 18 MICROgram(s) Capsule 1 Capsule(s) Inhalation daily    T(C): 36.7 (03-24-20 @ 17:52), Max: 37.1 (03-23-20 @ 19:19)  HR: 85 (03-24-20 @ 17:52) (69 - 89)  BP: 133/64 (03-24-20 @ 17:52) (130/69 - 157/82)  RR: 18 (03-24-20 @ 17:52) (18 - 19)  SpO2: 93% (03-24-20 @ 17:52) (92% - 95%)    Constitutional: NAD.   HEENT: PERRL, EOMI, MMM.  Neck: Soft and supple, No carotid bruit, No JVD  Respiratory: Breath sounds are clear bilaterally, No wheezing, rales or rhonchi  Cardiovascular: S1 and S2, regular rate and rhythm, no murmur, rub or gallop.  Gastrointestinal: Bowel Sounds present, soft, nontender, nondistended, no guarding, no rebound, no mass.  Extremities: No peripheral edema  Vascular: 2+ peripheral pulses  Neurological: A/O x , no focal deficits  Musculoskeletal: 5/5 strength b/l upper and lower extremities  Skin:  no visible rashes.                         11.8   6.86  )-----------( 184      ( 23 Mar 2020 13:23 )             34.6       03-23    141  |  110<H>  |  15  ----------------------------<  100<H>  3.7   |  25  |  0.76    Ca    9.3      23 Mar 2020 13:23    TPro  6.9  /  Alb  3.3  /  TBili  0.6  /  DBili  x   /  AST  15  /  ALT  24  /  AlkPhos  98  03-23

## 2020-03-25 DIAGNOSIS — B34.2 CORONAVIRUS INFECTION, UNSPECIFIED: ICD-10-CM

## 2020-03-25 DIAGNOSIS — Z29.9 ENCOUNTER FOR PROPHYLACTIC MEASURES, UNSPECIFIED: ICD-10-CM

## 2020-03-25 DIAGNOSIS — D69.6 THROMBOCYTOPENIA, UNSPECIFIED: ICD-10-CM

## 2020-03-25 DIAGNOSIS — E55.9 VITAMIN D DEFICIENCY, UNSPECIFIED: ICD-10-CM

## 2020-03-25 DIAGNOSIS — J18.9 PNEUMONIA, UNSPECIFIED ORGANISM: ICD-10-CM

## 2020-03-25 PROCEDURE — 99233 SBSQ HOSP IP/OBS HIGH 50: CPT

## 2020-03-25 RX ORDER — ACETAMINOPHEN 500 MG
650 TABLET ORAL EVERY 6 HOURS
Refills: 0 | Status: DISCONTINUED | OUTPATIENT
Start: 2020-03-25 | End: 2020-03-27

## 2020-03-25 RX ORDER — AZITHROMYCIN 500 MG/1
250 TABLET, FILM COATED ORAL DAILY
Refills: 0 | Status: COMPLETED | OUTPATIENT
Start: 2020-03-25 | End: 2020-03-27

## 2020-03-25 RX ADMIN — ENOXAPARIN SODIUM 40 MILLIGRAM(S): 100 INJECTION SUBCUTANEOUS at 22:08

## 2020-03-25 RX ADMIN — ATORVASTATIN CALCIUM 20 MILLIGRAM(S): 80 TABLET, FILM COATED ORAL at 22:07

## 2020-03-25 RX ADMIN — Medication 25 MICROGRAM(S): at 06:28

## 2020-03-25 RX ADMIN — CEFTRIAXONE 1000 MILLIGRAM(S): 500 INJECTION, POWDER, FOR SOLUTION INTRAMUSCULAR; INTRAVENOUS at 11:01

## 2020-03-25 RX ADMIN — Medication 200 MILLIGRAM(S): at 22:08

## 2020-03-25 RX ADMIN — PANTOPRAZOLE SODIUM 40 MILLIGRAM(S): 20 TABLET, DELAYED RELEASE ORAL at 06:29

## 2020-03-25 RX ADMIN — AZITHROMYCIN 250 MILLIGRAM(S): 500 TABLET, FILM COATED ORAL at 11:01

## 2020-03-25 RX ADMIN — Medication 200 MILLIGRAM(S): at 11:01

## 2020-03-25 RX ADMIN — TIOTROPIUM BROMIDE 1 CAPSULE(S): 18 CAPSULE ORAL; RESPIRATORY (INHALATION) at 15:55

## 2020-03-25 NOTE — PROGRESS NOTE ADULT - ATTENDING COMMENTS
Patient seen and examined with Family Medicine Residents Dr Brian Rivas and Dr. Hackett on the Family Medicine Teaching Service.  Agree with history, physical, labs and plan which were reviewed in detail after a face to face encounter with the patient.      I was physically present for the key portions of the evaluation and management (E/M) service provided.  I agree with the above history, physical, and plan which I have reviewed and edited where appropriate.

## 2020-03-25 NOTE — PROGRESS NOTE ADULT - ASSESSMENT
Pt is a 66 y/o F w/  PMHx hypothyroidism,  Rectal ca in 2016 s/p Chemo, Obesity,  GIOVANNA on CPAP, Pna in Dec 2019 who   c/o fever,  increasing SOB ,  and recent outpatient PNA , COVID positive.

## 2020-03-26 ENCOUNTER — TRANSCRIPTION ENCOUNTER (OUTPATIENT)
Age: 65
End: 2020-03-26

## 2020-03-26 PROCEDURE — 99233 SBSQ HOSP IP/OBS HIGH 50: CPT

## 2020-03-26 RX ADMIN — Medication 200 MILLIGRAM(S): at 10:31

## 2020-03-26 RX ADMIN — AZITHROMYCIN 250 MILLIGRAM(S): 500 TABLET, FILM COATED ORAL at 10:31

## 2020-03-26 RX ADMIN — Medication 200 MILLIGRAM(S): at 21:48

## 2020-03-26 RX ADMIN — Medication 100 MILLIGRAM(S): at 10:31

## 2020-03-26 RX ADMIN — CEFTRIAXONE 1000 MILLIGRAM(S): 500 INJECTION, POWDER, FOR SOLUTION INTRAMUSCULAR; INTRAVENOUS at 10:31

## 2020-03-26 RX ADMIN — PANTOPRAZOLE SODIUM 40 MILLIGRAM(S): 20 TABLET, DELAYED RELEASE ORAL at 10:31

## 2020-03-26 RX ADMIN — ENOXAPARIN SODIUM 40 MILLIGRAM(S): 100 INJECTION SUBCUTANEOUS at 21:48

## 2020-03-26 RX ADMIN — Medication 25 MICROGRAM(S): at 07:13

## 2020-03-26 RX ADMIN — ATORVASTATIN CALCIUM 20 MILLIGRAM(S): 80 TABLET, FILM COATED ORAL at 21:48

## 2020-03-26 NOTE — PROGRESS NOTE ADULT - SUBJECTIVE AND OBJECTIVE BOX
CHIEF COMPLAINT:    SUBJECTIVE:   HPI:  Pt is a 64 y/o F w/  PMHx hypothyroidism,  Rectal ca in 2016 s/p Chemo, Obesity,  GIOVANNA on CPAP, Pna in Dec 2019 who   c/o fever,  increasing SOB ,  and recent outpatient PNA , COVID positive.  Pt was diagnosed with pneumonia on   3/18/20 after an CXR and COVID testing was sent.  Initially she was given amoxicillin and it was switched to doxycycline. Pt completed 5 days of doxycycline.  She saw Dr. Roach , three days prior to admission and was started on prednisone, tylenol, and albuterol nebs. The day prior to admission she was notified of her positive COVID-19 test.   Pt reports the morning of day of admission, she woke from 3am-6am with SOB and severe cough.  Her O2 sat at home was 88%,  she took her albuterol nebs and the saturation improved to 92%.  Pt reports a fever of 102F and increasing SOB with activity and discomfort in her chest assoc with SOB and cough with yellowish sputum.  She denies nausea or myalgias or abd pain.      3/26: Pt states that the cough and SOB is better. Pt has been afebrile. May consider discharging the patient tomorrow    REVIEW OF SYSTEMS:  CONSTITUTIONAL: No weakness, fevers or chills  EYES/ENT: No visual changes;  No vertigo or throat pain   NECK: No pain or stiffness  RESPIRATORY: + cough improved,no wheezing,no hemoptysis; No shortness of breath  CARDIOVASCULAR: No chest pain or palpitations  GASTROINTESTINAL: No abdominal or epigastric pain. No nausea, vomiting, or hematemesis; No diarrhea or constipation. No melena or hematochezia.  GENITOURINARY: No dysuria, frequency or hematuria  NEUROLOGICAL: No numbness or weakness  SKIN: No itching, burning, rashes, or lesions   All other review of systems is negative unless indicated above    Vital Signs Last 24 Hrs  T(C): 36.2 (26 Mar 2020 08:36), Max: 37 (25 Mar 2020 15:53)  T(F): 97.2 (26 Mar 2020 08:36), Max: 98.6 (25 Mar 2020 15:53)  HR: 68 (26 Mar 2020 08:36) (68 - 71)  BP: 113/89 (26 Mar 2020 08:36) (113/89 - 118/76)  BP(mean): --  RR: 18 (26 Mar 2020 08:36) (18 - 19)  SpO2: 96% (26 Mar 2020 08:36) (96% - 97%)    I&O's Summary      CAPILLARY BLOOD GLUCOSE          PHYSICAL EXAM:  Constitutional: NAD, awake and alert, well-developed  HEENT: PERR, EOMI, Normal Hearing, MMM  Neck: Soft and supple, No LAD, No JVD  Respiratory: Breath sounds are clear bilaterally, No wheezing, rales or rhonchi  Cardiovascular: S1 and S2, regular rate and rhythm, no Murmurs, gallops or rubs  Gastrointestinal: Bowel Sounds present, soft, nontender, nondistended, no guarding, no rebound  Extremities: No peripheral edema  Vascular: 2+ peripheral pulses  Neurological: A/O x 3, no focal deficits  Musculoskeletal: 5/5 strength b/l upper and lower extremities  Skin: No rashes    MEDICATIONS:  MEDICATIONS  (STANDING):  atorvastatin 20 milliGRAM(s) Oral at bedtime  azithromycin   Tablet 250 milliGRAM(s) Oral daily  cefTRIAXone Injectable. 1000 milliGRAM(s) IV Push every 24 hours  cefTRIAXone Injectable.      enoxaparin Injectable 40 milliGRAM(s) SubCutaneous at bedtime  hydroxychloroquine 200 milliGRAM(s) Oral every 12 hours  influenza   Vaccine 0.5 milliLiter(s) IntraMuscular once  levothyroxine 25 MICROGram(s) Oral daily  pantoprazole    Tablet 40 milliGRAM(s) Oral before breakfast  tiotropium 18 MICROgram(s) Capsule 1 Capsule(s) Inhalation daily      LABS: All Labs Reviewed:                Blood Culture:     RADIOLOGY/EKG:  < from: Xray Chest 1 View- PORTABLE-Urgent (03.23.20 @ 13:15) >  Impression: Subtle infiltrates bilaterally. Underlying pneumonia including viral pneumonia not excluded    < end of copied text >  DVT PPX:  lovenox 40  ADVANCED DIRECTIVE:    DISPOSITION:

## 2020-03-26 NOTE — DISCHARGE NOTE PROVIDER - HOSPITAL COURSE
Pt is a 64 y/o F with a history of hypothyroidism,  Rectal cancer in 2016 s/p Chemotherapy, Obesity,  Obstructive sleep apnea who came to the emergency room for fever,  increasing shortness of breath ,  and recent treatment for pneumonia outpatient and completed a course of doxycycline. The patient was tested for COVID and it came back positive. The patient was having increased shortness of breath with a drop in he O2 saturation at home to 88% and decided to come to the hospital. While in the hospital the patient was started on hydroxychloroquine, ceftriaxone, and azithromycin. The patient has been afebrile and medically stable for discharge and will complete the 5 day course of hydroxychloroquine.

## 2020-03-26 NOTE — DISCHARGE NOTE PROVIDER - CARE PROVIDER_API CALL
Irving Roach  [address] [place] [address]  Phone: [phone]  Fax: (   )    -  Follow Up Time: 2 weeks

## 2020-03-26 NOTE — DISCHARGE NOTE PROVIDER - PROVIDER TOKENS
FREE:[LAST:[Doc],FIRST:[Irving],PHONE:[phone]],FAX:[(   [phone]    -],ADDRESS:[[address] [place] [address]],FOLLOWUP:[2 weeks]]

## 2020-03-26 NOTE — DISCHARGE NOTE PROVIDER - NSDCCPCAREPLAN_GEN_ALL_CORE_FT
PRINCIPAL DISCHARGE DIAGNOSIS  Diagnosis: Coronavirus infection  Assessment and Plan of Treatment: you was started on hydroxychloroquine for management of the coronavirus. You must complete a total of 5 days worth of the hydroxychloroquine. Should you start having blurry vision, chest pain, worsening shortness of breath then seek medical attention immediately.  Follow up with your primary care physician in 1 week      SECONDARY DISCHARGE DIAGNOSES  Diagnosis: Hypothyroidism  Assessment and Plan of Treatment: continue home medication dose of levothyroxine and follow up with your primary care physician for further management    Diagnosis: Thrombocytopenia  Assessment and Plan of Treatment: Thrombocytopenia  This is likely from the coronavirus. Follow up with your primary care physician to recheck your blood work.    Diagnosis: Community acquired pneumonia  Assessment and Plan of Treatment: Community acquired pneumonia  You completed a course of doxycycline outpatient.   You have not had a fever and are not showing any further signs of infection.   If you have a fever and/or increase shortness of breath then call your primary care physician.

## 2020-03-26 NOTE — DISCHARGE NOTE PROVIDER - NSDCMRMEDTOKEN_GEN_ALL_CORE_FT
albuterol 2.5 mg/3 mL (0.083%) inhalation solution: Use as directed  colestipol 1 g oral tablet: 4 tab(s) orally once a day  from other meds by at least 2 hours  levothyroxine 25 mcg (0.025 mg) oral tablet: 1 tab(s) orally once a day  pantoprazole 40 mg oral delayed release tablet: 1 tab(s) orally once a day  Refresh Optive ophthalmic solution: 1 drop(s) to each affected eye 2 times a day, As Needed - for dry eyes

## 2020-03-26 NOTE — PROGRESS NOTE ADULT - ASSESSMENT
Pt is a 64 y/o F w/  PMHx hypothyroidism,  Rectal ca in 2016 s/p Chemo, Obesity,  GIOVANNA on CPAP, Pna in Dec 2019 who   c/o fever,  increasing SOB ,  and recent outpatient PNA , COVID positive.

## 2020-03-27 ENCOUNTER — TRANSCRIPTION ENCOUNTER (OUTPATIENT)
Age: 65
End: 2020-03-27

## 2020-03-27 VITALS
RESPIRATION RATE: 19 BRPM | SYSTOLIC BLOOD PRESSURE: 142 MMHG | DIASTOLIC BLOOD PRESSURE: 77 MMHG | OXYGEN SATURATION: 98 % | TEMPERATURE: 97 F | HEART RATE: 83 BPM

## 2020-03-27 DIAGNOSIS — E03.9 HYPOTHYROIDISM, UNSPECIFIED: ICD-10-CM

## 2020-03-27 PROCEDURE — 99239 HOSP IP/OBS DSCHRG MGMT >30: CPT

## 2020-03-27 RX ORDER — HYDROXYCHLOROQUINE SULFATE 200 MG
1 TABLET ORAL
Qty: 2 | Refills: 0
Start: 2020-03-27 | End: 2020-03-27

## 2020-03-27 RX ORDER — AZITHROMYCIN 500 MG/1
1 TABLET, FILM COATED ORAL
Qty: 1 | Refills: 0
Start: 2020-03-27 | End: 2020-03-27

## 2020-03-27 RX ADMIN — AZITHROMYCIN 250 MILLIGRAM(S): 500 TABLET, FILM COATED ORAL at 10:34

## 2020-03-27 RX ADMIN — CEFTRIAXONE 1000 MILLIGRAM(S): 500 INJECTION, POWDER, FOR SOLUTION INTRAMUSCULAR; INTRAVENOUS at 10:34

## 2020-03-27 RX ADMIN — Medication 25 MICROGRAM(S): at 06:48

## 2020-03-27 RX ADMIN — PANTOPRAZOLE SODIUM 40 MILLIGRAM(S): 20 TABLET, DELAYED RELEASE ORAL at 10:35

## 2020-03-27 RX ADMIN — Medication 200 MILLIGRAM(S): at 10:34

## 2020-03-27 NOTE — PROGRESS NOTE ADULT - REASON FOR ADMISSION
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

## 2020-03-27 NOTE — PROGRESS NOTE ADULT - SUBJECTIVE AND OBJECTIVE BOX
CHIEF COMPLAINT:    SUBJECTIVE:   HPI:  Pt is a 64 y/o F w/  PMHx hypothyroidism,  Rectal ca in 2016 s/p Chemo, Obesity,  GIOVANNA on CPAP, Pna in Dec 2019 who   c/o fever,  increasing SOB ,  and recent outpatient PNA , COVID positive.  Pt was diagnosed with pneumonia on   3/18/20 after an CXR and COVID testing was sent.  Initially she was given amoxicillin and it was switched to doxycycline. Pt completed 5 days of doxycycline.  She saw Dr. Roach , three days prior to admission and was started on prednisone, tylenol, and albuterol nebs. The day prior to admission she was notified of her positive COVID-19 test.   Pt reports the morning of day of admission, she woke from 3am-6am with SOB and severe cough.  Her O2 sat at home was 88%,  she took her albuterol nebs and the saturation improved to 92%.  Pt reports a fever of 102F and increasing SOB with activity and discomfort in her chest assoc with SOB and cough with yellowish sputum.  She denies nausea or myalgias or abd pain.      3/27: pt seen and examined at bedside. pt states that the Shortness of breath is better and the cough has improved. Took the nasal cannula off in the room and the patient was at 95% on room air. Pt has no complaints and is ready to go home.    REVIEW OF SYSTEMS:  CONSTITUTIONAL: No weakness, fevers or chills  EYES/ENT: No visual changes;  No vertigo or throat pain   NECK: No pain or stiffness  RESPIRATORY: + cough, no wheezing, hemoptysis; No shortness of breath  CARDIOVASCULAR: No chest pain or palpitations  GASTROINTESTINAL: No abdominal or epigastric pain. No nausea, vomiting, or hematemesis; No diarrhea or constipation. No melena or hematochezia.  GENITOURINARY: No dysuria, frequency or hematuria  NEUROLOGICAL: No numbness or weakness  SKIN: No itching, burning, rashes, or lesions   All other review of systems is negative unless indicated above    Vital Signs Last 24 Hrs  T(C): 36.6 (26 Mar 2020 23:53), Max: 36.6 (26 Mar 2020 23:53)  T(F): 97.9 (26 Mar 2020 23:53), Max: 97.9 (26 Mar 2020 23:53)  HR: 78 (26 Mar 2020 23:53) (68 - 78)  BP: 122/62 (26 Mar 2020 23:53) (113/89 - 125/73)  BP(mean): --  RR: 19 (26 Mar 2020 23:53) (18 - 19)  SpO2: 100% (26 Mar 2020 23:53) (96% - 100%)    I&O's Summary      CAPILLARY BLOOD GLUCOSE          PHYSICAL EXAM:  Constitutional: NAD, awake and alert, well-developed  HEENT: PERR, EOMI, Normal Hearing, MMM  Neck: Soft and supple, No LAD  Respiratory: Breath sounds are clear bilaterally, No wheezing, rales or rhonchi  Cardiovascular: S1 and S2, regular rate and rhythm, no Murmurs, gallops or rubs  Gastrointestinal: Bowel Sounds present, soft, nontender, nondistended, no guarding, no rebound  Extremities: No peripheral edema  Vascular: 2+ peripheral pulses  Neurological: A/O x 3, no focal deficits  Musculoskeletal: 5/5 strength b/l upper and lower extremities  Skin: No rashes    MEDICATIONS:  MEDICATIONS  (STANDING):  atorvastatin 20 milliGRAM(s) Oral at bedtime  azithromycin   Tablet 250 milliGRAM(s) Oral daily  cefTRIAXone Injectable. 1000 milliGRAM(s) IV Push every 24 hours  cefTRIAXone Injectable.      enoxaparin Injectable 40 milliGRAM(s) SubCutaneous at bedtime  hydroxychloroquine 200 milliGRAM(s) Oral every 12 hours  influenza   Vaccine 0.5 milliLiter(s) IntraMuscular once  levothyroxine 25 MICROGram(s) Oral daily  pantoprazole    Tablet 40 milliGRAM(s) Oral before breakfast  tiotropium 18 MICROgram(s) Capsule 1 Capsule(s) Inhalation daily      LABS: All Labs Reviewed:                Blood Culture:     RADIOLOGY/EKG:  < from: Xray Chest 1 View- PORTABLE-Urgent (03.23.20 @ 13:15) >  Impression: Subtle infiltrates bilaterally. Underlying pneumonia including viral pneumonia not excluded    < end of copied text >  DVT PPX:  lovenox 40  ADVANCED DIRECTIVE:    DISPOSITION:

## 2020-03-27 NOTE — PROGRESS NOTE ADULT - ASSESSMENT
Pt is a 64 y/o F w/  PMHx hypothyroidism,  Rectal ca in 2016 s/p Chemo, Obesity,  GIOVANNA on CPAP, Pna in Dec 2019 admitted on 3/23 for evaluation of fever, shortness of breath; was diagnosed as outpatient with pneumonia and found to be COVID-19 positive; was started on po doxycycline but her cough progressed and she felt more short of breath as well as more weak. 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  1. Patient admitted with pneumonia which will treat as community acquired pneumonia; also with viral syndrome secondary to COVID-19   - follow up cultures   - serial cbc and monitor temperature   - reviewed prior medical records to evaluate for resistant or atypical pathogens   - iv hydration and supportive care   - oxygen and nebs as needed   - agree with ceftriaxone and zithromax, started on 3/23, to end today  - on hydroxychloroquine, to complete on 3/28  - okay from id standpoint to discharge home  2. other issues; per medicine

## 2020-03-27 NOTE — PROGRESS NOTE ADULT - PROBLEM SELECTOR PLAN 2
s/p doxycycline outpatient  continue azithromycin for 5 days total and ceftriaxone for 5 days total  chest xray as above
s/p doxycycline outpatient  will discontinue ceftriaxone and azithromycin upon discharge  chest xray as above
s/p doxycycline outpatient  continue azithromycin for 5 days total and ceftriaxone for 5 days total  chest xray as above

## 2020-03-27 NOTE — DISCHARGE NOTE NURSING/CASE MANAGEMENT/SOCIAL WORK - PATIENT PORTAL LINK FT
You can access the FollowMyHealth Patient Portal offered by [facility] by registering at the following website: [email or web address]. By joining [facility]’s FollowMyHealth portal, you will also be able to view your health information using other applications (apps) compatible with our system.

## 2020-03-27 NOTE — PROGRESS NOTE ADULT - PROBLEM SELECTOR PLAN 1
COVID 19+ outpatient  chest xray as above  continue hydroxychloroquine for a total of 5 days  ID consult appreciated  tylenol prn for fever, proventil for SOB  continue atorvastatin 20mg   robitussin prn for cough, tessalon perle for cough  - s/p 40mg Solumedrol , albuterol MDI, spiriva in the ED,  will cont albuterol MDI, spiriva  -trop neg
COVID 19+ outpatient  chest xray as above  continue hydroxychloroquine for a total of 5 days  ID consult appreciated  tylenol prn for fever, proventil for SOB  continue atorvastatin 20mg   robitussin prn for cough, tessalon perle for cough  - s/p 40mg Solumedrol , albuterol MDI, spiriva in the ED,  will cont albuterol MDI, spiriva  -trop neg
COVID 19+ outpatient  chest xray as above  continue hydroxychloroquine for a total of 5 days  ID consult appreciated  tylenol prn for fever, proventil for SOB  continue atorvastatin 20mg   robitussin prn for cough  - s/p 40mg Solumedrol , albuterol MDI, spiriva in the ED,  will cont albuterol MDI, spiriva  -trop neg

## 2020-03-27 NOTE — PROGRESS NOTE ADULT - SUBJECTIVE AND OBJECTIVE BOX
Date of service: 03-27-20 @ 10:21    Patient is ambulating in room, afebrile; oxygen sat is 95% on room air        ROS: no fever or chills; denies dizziness, no HA, no SOB, no abdominal pain, no diarrhea or constipation; no dysuria, no urinary frequency, no legs pain, no rashes    MEDICATIONS  (STANDING):  atorvastatin 20 milliGRAM(s) Oral at bedtime  azithromycin   Tablet 250 milliGRAM(s) Oral daily  cefTRIAXone Injectable. 1000 milliGRAM(s) IV Push every 24 hours  cefTRIAXone Injectable.      enoxaparin Injectable 40 milliGRAM(s) SubCutaneous at bedtime  hydroxychloroquine 200 milliGRAM(s) Oral every 12 hours  influenza   Vaccine 0.5 milliLiter(s) IntraMuscular once  levothyroxine 25 MICROGram(s) Oral daily  pantoprazole    Tablet 40 milliGRAM(s) Oral before breakfast  tiotropium 18 MICROgram(s) Capsule 1 Capsule(s) Inhalation daily    MEDICATIONS  (PRN):  acetaminophen   Tablet .. 650 milliGRAM(s) Oral every 6 hours PRN Temp greater or equal to 38C (100.4F)  ALBUTerol    90 MICROgram(s) HFA Inhaler 2 Puff(s) Inhalation every 6 hours PRN Shortness of Breath and/or Wheezing  artificial  tears Solution 1 Drop(s) Both EYES two times a day PRN for dry eyes  benzonatate 100 milliGRAM(s) Oral every 8 hours PRN Cough  guaiFENesin   Syrup  (Sugar-Free) 200 milliGRAM(s) Oral every 6 hours PRN Cough      Vital Signs Last 24 Hrs  T(C): 36.6 (26 Mar 2020 23:53), Max: 36.6 (26 Mar 2020 23:53)  T(F): 97.9 (26 Mar 2020 23:53), Max: 97.9 (26 Mar 2020 23:53)  HR: 78 (26 Mar 2020 23:53) (71 - 78)  BP: 122/62 (26 Mar 2020 23:53) (122/62 - 125/73)  BP(mean): --  RR: 19 (26 Mar 2020 23:53) (19 - 19)  SpO2: 100% (26 Mar 2020 23:53) (100% - 100%)    Physical Exam:          Constitutional: frail looking  HEENT: NC/AT, EOMI, PERRLA, conjunctivae clear; ears and nose atraumatic; pharynx clear  Neck: supple; thyroid not palpable  Back: no tenderness  Respiratory: respiratory effort normal; clear to auscultation with bibasilar rales  Cardiovascular: S1S2 regular, no murmurs  Abdomen: soft, not tender, not distended, positive BS; no liver or spleen organomegaly  Genitourinary: no suprapubic tenderness  Musculoskeletal: no muscle tenderness, no joint swelling or tenderness; mild peripheral edema  Neurological/ Psychiatric: AxOx3, judgement and insight normal;  moving all extremities  Skin: no rashes; no palpable lesions    Labs: all available labs reviewed            outpatient COVID-19 positive    < from: Xray Chest 1 View- PORTABLE-Urgent (03.23.20 @ 13:15) >    EXAM:  XR CHEST PORTABLE URGENT 1V                            PROCEDURE DATE:  03/23/2020          INTERPRETATION:  Clinical history: Shortness of breath    Comparison is made to priors    Vague opacity of the right upper lung is stable from March 19. There is question of developing infiltrate of the left midlung. Heart and mediastinum are stable. No pleural effusions.    Impression: Subtle infiltrates bilaterally. Underlying pneumonia including viral pneumonia not excluded      < end of copied text >        Radiology: all available radiological tests reviewed    Advanced directives addressed: full resuscitation

## 2020-03-28 NOTE — RESULTS/DATA
Attempted to call pt to discuss her concerns, left VM to call clinic.  
[FreeTextEntry1] : Reviewed today;s BW results with patient

## 2020-04-01 ENCOUNTER — OUTPATIENT (OUTPATIENT)
Dept: OUTPATIENT SERVICES | Facility: HOSPITAL | Age: 65
LOS: 1 days | End: 2020-04-01
Payer: MEDICAID

## 2020-04-01 DIAGNOSIS — E03.9 HYPOTHYROIDISM, UNSPECIFIED: ICD-10-CM

## 2020-04-01 DIAGNOSIS — Z98.891 HISTORY OF UTERINE SCAR FROM PREVIOUS SURGERY: Chronic | ICD-10-CM

## 2020-04-01 DIAGNOSIS — Z87.39 PERSONAL HISTORY OF OTHER DISEASES OF THE MUSCULOSKELETAL SYSTEM AND CONNECTIVE TISSUE: Chronic | ICD-10-CM

## 2020-04-01 DIAGNOSIS — G47.33 OBSTRUCTIVE SLEEP APNEA (ADULT) (PEDIATRIC): ICD-10-CM

## 2020-04-01 DIAGNOSIS — B97.29 OTHER CORONAVIRUS AS THE CAUSE OF DISEASES CLASSIFIED ELSEWHERE: ICD-10-CM

## 2020-04-01 DIAGNOSIS — Z98.84 BARIATRIC SURGERY STATUS: ICD-10-CM

## 2020-04-01 DIAGNOSIS — M19.90 UNSPECIFIED OSTEOARTHRITIS, UNSPECIFIED SITE: ICD-10-CM

## 2020-04-01 DIAGNOSIS — Z98.890 OTHER SPECIFIED POSTPROCEDURAL STATES: Chronic | ICD-10-CM

## 2020-04-01 DIAGNOSIS — Z96.652 PRESENCE OF LEFT ARTIFICIAL KNEE JOINT: ICD-10-CM

## 2020-04-01 DIAGNOSIS — Z79.52 LONG TERM (CURRENT) USE OF SYSTEMIC STEROIDS: ICD-10-CM

## 2020-04-01 DIAGNOSIS — Z96.652 PRESENCE OF LEFT ARTIFICIAL KNEE JOINT: Chronic | ICD-10-CM

## 2020-04-01 DIAGNOSIS — J12.89 OTHER VIRAL PNEUMONIA: ICD-10-CM

## 2020-04-01 DIAGNOSIS — D69.6 THROMBOCYTOPENIA, UNSPECIFIED: ICD-10-CM

## 2020-04-01 DIAGNOSIS — Z87.891 PERSONAL HISTORY OF NICOTINE DEPENDENCE: ICD-10-CM

## 2020-04-01 DIAGNOSIS — Z87.19 PERSONAL HISTORY OF OTHER DISEASES OF THE DIGESTIVE SYSTEM: Chronic | ICD-10-CM

## 2020-04-01 DIAGNOSIS — R09.02 HYPOXEMIA: ICD-10-CM

## 2020-04-01 DIAGNOSIS — Z87.01 PERSONAL HISTORY OF PNEUMONIA (RECURRENT): ICD-10-CM

## 2020-04-01 DIAGNOSIS — Z98.84 BARIATRIC SURGERY STATUS: Chronic | ICD-10-CM

## 2020-04-01 DIAGNOSIS — Z92.21 PERSONAL HISTORY OF ANTINEOPLASTIC CHEMOTHERAPY: ICD-10-CM

## 2020-04-01 DIAGNOSIS — Z90.49 ACQUIRED ABSENCE OF OTHER SPECIFIED PARTS OF DIGESTIVE TRACT: Chronic | ICD-10-CM

## 2020-04-01 DIAGNOSIS — J45.909 UNSPECIFIED ASTHMA, UNCOMPLICATED: ICD-10-CM

## 2020-04-01 DIAGNOSIS — Z79.51 LONG TERM (CURRENT) USE OF INHALED STEROIDS: ICD-10-CM

## 2020-04-01 DIAGNOSIS — Z85.048 PERSONAL HISTORY OF OTHER MALIGNANT NEOPLASM OF RECTUM, RECTOSIGMOID JUNCTION, AND ANUS: ICD-10-CM

## 2020-04-01 DIAGNOSIS — M51.36 OTHER INTERVERTEBRAL DISC DEGENERATION, LUMBAR REGION: ICD-10-CM

## 2020-04-01 PROCEDURE — G9001: CPT

## 2020-04-13 ENCOUNTER — OUTPATIENT (OUTPATIENT)
Dept: OUTPATIENT SERVICES | Facility: HOSPITAL | Age: 65
LOS: 1 days | Discharge: HOME | End: 2020-04-13

## 2020-04-13 DIAGNOSIS — E53.8 DEFICIENCY OF OTHER SPECIFIED B GROUP VITAMINS: ICD-10-CM

## 2020-04-13 DIAGNOSIS — Z98.891 HISTORY OF UTERINE SCAR FROM PREVIOUS SURGERY: Chronic | ICD-10-CM

## 2020-04-13 DIAGNOSIS — Z87.39 PERSONAL HISTORY OF OTHER DISEASES OF THE MUSCULOSKELETAL SYSTEM AND CONNECTIVE TISSUE: Chronic | ICD-10-CM

## 2020-04-13 DIAGNOSIS — Z90.49 ACQUIRED ABSENCE OF OTHER SPECIFIED PARTS OF DIGESTIVE TRACT: Chronic | ICD-10-CM

## 2020-04-13 DIAGNOSIS — Z87.19 PERSONAL HISTORY OF OTHER DISEASES OF THE DIGESTIVE SYSTEM: Chronic | ICD-10-CM

## 2020-04-13 DIAGNOSIS — Z96.652 PRESENCE OF LEFT ARTIFICIAL KNEE JOINT: Chronic | ICD-10-CM

## 2020-04-13 DIAGNOSIS — Z98.84 BARIATRIC SURGERY STATUS: Chronic | ICD-10-CM

## 2020-04-13 DIAGNOSIS — Z98.890 OTHER SPECIFIED POSTPROCEDURAL STATES: Chronic | ICD-10-CM

## 2020-04-17 DIAGNOSIS — Z71.89 OTHER SPECIFIED COUNSELING: ICD-10-CM

## 2020-04-27 ENCOUNTER — OUTPATIENT (OUTPATIENT)
Dept: OUTPATIENT SERVICES | Facility: HOSPITAL | Age: 65
LOS: 1 days | End: 2020-04-27
Payer: MEDICAID

## 2020-04-27 ENCOUNTER — APPOINTMENT (OUTPATIENT)
Dept: RADIOLOGY | Facility: CLINIC | Age: 65
End: 2020-04-27
Payer: MEDICAID

## 2020-04-27 DIAGNOSIS — Z98.890 OTHER SPECIFIED POSTPROCEDURAL STATES: Chronic | ICD-10-CM

## 2020-04-27 DIAGNOSIS — Z90.49 ACQUIRED ABSENCE OF OTHER SPECIFIED PARTS OF DIGESTIVE TRACT: Chronic | ICD-10-CM

## 2020-04-27 DIAGNOSIS — Z96.652 PRESENCE OF LEFT ARTIFICIAL KNEE JOINT: Chronic | ICD-10-CM

## 2020-04-27 DIAGNOSIS — Z87.19 PERSONAL HISTORY OF OTHER DISEASES OF THE DIGESTIVE SYSTEM: Chronic | ICD-10-CM

## 2020-04-27 DIAGNOSIS — Z98.84 BARIATRIC SURGERY STATUS: Chronic | ICD-10-CM

## 2020-04-27 DIAGNOSIS — Z98.891 HISTORY OF UTERINE SCAR FROM PREVIOUS SURGERY: Chronic | ICD-10-CM

## 2020-04-27 DIAGNOSIS — J18.9 PNEUMONIA, UNSPECIFIED ORGANISM: ICD-10-CM

## 2020-04-27 DIAGNOSIS — Z87.39 PERSONAL HISTORY OF OTHER DISEASES OF THE MUSCULOSKELETAL SYSTEM AND CONNECTIVE TISSUE: Chronic | ICD-10-CM

## 2020-04-27 PROCEDURE — 71046 X-RAY EXAM CHEST 2 VIEWS: CPT

## 2020-04-27 PROCEDURE — 71046 X-RAY EXAM CHEST 2 VIEWS: CPT | Mod: 26

## 2020-05-12 ENCOUNTER — OUTPATIENT (OUTPATIENT)
Dept: OUTPATIENT SERVICES | Facility: HOSPITAL | Age: 65
LOS: 1 days | Discharge: HOME | End: 2020-05-12

## 2020-05-12 DIAGNOSIS — Z87.39 PERSONAL HISTORY OF OTHER DISEASES OF THE MUSCULOSKELETAL SYSTEM AND CONNECTIVE TISSUE: Chronic | ICD-10-CM

## 2020-05-12 DIAGNOSIS — Z98.890 OTHER SPECIFIED POSTPROCEDURAL STATES: Chronic | ICD-10-CM

## 2020-05-12 DIAGNOSIS — Z98.84 BARIATRIC SURGERY STATUS: Chronic | ICD-10-CM

## 2020-05-12 DIAGNOSIS — E53.8 DEFICIENCY OF OTHER SPECIFIED B GROUP VITAMINS: ICD-10-CM

## 2020-05-12 DIAGNOSIS — Z96.652 PRESENCE OF LEFT ARTIFICIAL KNEE JOINT: Chronic | ICD-10-CM

## 2020-05-12 DIAGNOSIS — Z87.19 PERSONAL HISTORY OF OTHER DISEASES OF THE DIGESTIVE SYSTEM: Chronic | ICD-10-CM

## 2020-05-12 DIAGNOSIS — Z98.891 HISTORY OF UTERINE SCAR FROM PREVIOUS SURGERY: Chronic | ICD-10-CM

## 2020-05-12 DIAGNOSIS — Z90.49 ACQUIRED ABSENCE OF OTHER SPECIFIED PARTS OF DIGESTIVE TRACT: Chronic | ICD-10-CM

## 2020-05-18 ENCOUNTER — TRANSCRIPTION ENCOUNTER (OUTPATIENT)
Age: 65
End: 2020-05-18

## 2020-05-21 ENCOUNTER — INPATIENT (INPATIENT)
Facility: HOSPITAL | Age: 65
LOS: 0 days | Discharge: HOME | DRG: 227 | End: 2020-05-21
Attending: SURGERY | Admitting: SURGERY
Payer: MEDICAID

## 2020-05-21 ENCOUNTER — TRANSCRIPTION ENCOUNTER (OUTPATIENT)
Age: 65
End: 2020-05-21

## 2020-05-21 VITALS
TEMPERATURE: 98 F | RESPIRATION RATE: 19 BRPM | OXYGEN SATURATION: 95 % | DIASTOLIC BLOOD PRESSURE: 64 MMHG | HEART RATE: 71 BPM | SYSTOLIC BLOOD PRESSURE: 137 MMHG

## 2020-05-21 VITALS — WEIGHT: 237 LBS | HEIGHT: 63 IN

## 2020-05-21 DIAGNOSIS — Z90.49 ACQUIRED ABSENCE OF OTHER SPECIFIED PARTS OF DIGESTIVE TRACT: Chronic | ICD-10-CM

## 2020-05-21 DIAGNOSIS — Z96.652 PRESENCE OF LEFT ARTIFICIAL KNEE JOINT: Chronic | ICD-10-CM

## 2020-05-21 DIAGNOSIS — Z98.890 OTHER SPECIFIED POSTPROCEDURAL STATES: Chronic | ICD-10-CM

## 2020-05-21 DIAGNOSIS — K46.9 UNSPECIFIED ABDOMINAL HERNIA WITHOUT OBSTRUCTION OR GANGRENE: ICD-10-CM

## 2020-05-21 DIAGNOSIS — Z87.39 PERSONAL HISTORY OF OTHER DISEASES OF THE MUSCULOSKELETAL SYSTEM AND CONNECTIVE TISSUE: Chronic | ICD-10-CM

## 2020-05-21 DIAGNOSIS — Z87.19 PERSONAL HISTORY OF OTHER DISEASES OF THE DIGESTIVE SYSTEM: Chronic | ICD-10-CM

## 2020-05-21 DIAGNOSIS — Z98.891 HISTORY OF UTERINE SCAR FROM PREVIOUS SURGERY: Chronic | ICD-10-CM

## 2020-05-21 DIAGNOSIS — Z98.84 BARIATRIC SURGERY STATUS: Chronic | ICD-10-CM

## 2020-05-21 LAB
ALBUMIN SERPL ELPH-MCNC: 3.7 G/DL — SIGNIFICANT CHANGE UP (ref 3.3–5)
ALP SERPL-CCNC: 80 U/L — SIGNIFICANT CHANGE UP (ref 40–120)
ALT FLD-CCNC: 23 U/L — SIGNIFICANT CHANGE UP (ref 12–78)
ANION GAP SERPL CALC-SCNC: 4 MMOL/L — LOW (ref 5–17)
APPEARANCE UR: CLEAR — SIGNIFICANT CHANGE UP
APTT BLD: 32.9 SEC — SIGNIFICANT CHANGE UP (ref 27.5–36.3)
AST SERPL-CCNC: 13 U/L — LOW (ref 15–37)
BASOPHILS # BLD AUTO: 0.05 K/UL — SIGNIFICANT CHANGE UP (ref 0–0.2)
BASOPHILS NFR BLD AUTO: 1.2 % — SIGNIFICANT CHANGE UP (ref 0–2)
BILIRUB SERPL-MCNC: 1 MG/DL — SIGNIFICANT CHANGE UP (ref 0.2–1.2)
BILIRUB UR-MCNC: NEGATIVE — SIGNIFICANT CHANGE UP
BUN SERPL-MCNC: 12 MG/DL — SIGNIFICANT CHANGE UP (ref 7–23)
CALCIUM SERPL-MCNC: 9.4 MG/DL — SIGNIFICANT CHANGE UP (ref 8.5–10.1)
CHLORIDE SERPL-SCNC: 110 MMOL/L — HIGH (ref 96–108)
CO2 SERPL-SCNC: 28 MMOL/L — SIGNIFICANT CHANGE UP (ref 22–31)
COLOR SPEC: YELLOW — SIGNIFICANT CHANGE UP
CREAT SERPL-MCNC: 0.72 MG/DL — SIGNIFICANT CHANGE UP (ref 0.5–1.3)
DIFF PNL FLD: NEGATIVE — SIGNIFICANT CHANGE UP
EOSINOPHIL # BLD AUTO: 0.16 K/UL — SIGNIFICANT CHANGE UP (ref 0–0.5)
EOSINOPHIL NFR BLD AUTO: 3.7 % — SIGNIFICANT CHANGE UP (ref 0–6)
GLUCOSE SERPL-MCNC: 94 MG/DL — SIGNIFICANT CHANGE UP (ref 70–99)
GLUCOSE UR QL: NEGATIVE MG/DL — SIGNIFICANT CHANGE UP
HCT VFR BLD CALC: 37.2 % — SIGNIFICANT CHANGE UP (ref 34.5–45)
HGB BLD-MCNC: 12.5 G/DL — SIGNIFICANT CHANGE UP (ref 11.5–15.5)
IMM GRANULOCYTES NFR BLD AUTO: 0.5 % — SIGNIFICANT CHANGE UP (ref 0–1.5)
INR BLD: 1.03 RATIO — SIGNIFICANT CHANGE UP (ref 0.88–1.16)
KETONES UR-MCNC: NEGATIVE — SIGNIFICANT CHANGE UP
LACTATE SERPL-SCNC: 0.8 MMOL/L — SIGNIFICANT CHANGE UP (ref 0.7–2)
LEUKOCYTE ESTERASE UR-ACNC: ABNORMAL
LYMPHOCYTES # BLD AUTO: 0.92 K/UL — LOW (ref 1–3.3)
LYMPHOCYTES # BLD AUTO: 21.5 % — SIGNIFICANT CHANGE UP (ref 13–44)
MCHC RBC-ENTMCNC: 28.7 PG — SIGNIFICANT CHANGE UP (ref 27–34)
MCHC RBC-ENTMCNC: 33.6 GM/DL — SIGNIFICANT CHANGE UP (ref 32–36)
MCV RBC AUTO: 85.3 FL — SIGNIFICANT CHANGE UP (ref 80–100)
MONOCYTES # BLD AUTO: 0.32 K/UL — SIGNIFICANT CHANGE UP (ref 0–0.9)
MONOCYTES NFR BLD AUTO: 7.5 % — SIGNIFICANT CHANGE UP (ref 2–14)
NEUTROPHILS # BLD AUTO: 2.81 K/UL — SIGNIFICANT CHANGE UP (ref 1.8–7.4)
NEUTROPHILS NFR BLD AUTO: 65.6 % — SIGNIFICANT CHANGE UP (ref 43–77)
NITRITE UR-MCNC: NEGATIVE — SIGNIFICANT CHANGE UP
PH UR: 5 — SIGNIFICANT CHANGE UP (ref 5–8)
PLATELET # BLD AUTO: 184 K/UL — SIGNIFICANT CHANGE UP (ref 150–400)
POTASSIUM SERPL-MCNC: 4.4 MMOL/L — SIGNIFICANT CHANGE UP (ref 3.5–5.3)
POTASSIUM SERPL-SCNC: 4.4 MMOL/L — SIGNIFICANT CHANGE UP (ref 3.5–5.3)
PROT SERPL-MCNC: 6.8 GM/DL — SIGNIFICANT CHANGE UP (ref 6–8.3)
PROT UR-MCNC: NEGATIVE MG/DL — SIGNIFICANT CHANGE UP
PROTHROM AB SERPL-ACNC: 11.5 SEC — SIGNIFICANT CHANGE UP (ref 10–12.9)
RBC # BLD: 4.36 M/UL — SIGNIFICANT CHANGE UP (ref 3.8–5.2)
RBC # FLD: 13.5 % — SIGNIFICANT CHANGE UP (ref 10.3–14.5)
SARS-COV-2 RNA SPEC QL NAA+PROBE: SIGNIFICANT CHANGE UP
SODIUM SERPL-SCNC: 142 MMOL/L — SIGNIFICANT CHANGE UP (ref 135–145)
SP GR SPEC: 1.01 — SIGNIFICANT CHANGE UP (ref 1.01–1.02)
UROBILINOGEN FLD QL: NEGATIVE MG/DL — SIGNIFICANT CHANGE UP
WBC # BLD: 4.28 K/UL — SIGNIFICANT CHANGE UP (ref 3.8–10.5)
WBC # FLD AUTO: 4.28 K/UL — SIGNIFICANT CHANGE UP (ref 3.8–10.5)

## 2020-05-21 PROCEDURE — 74177 CT ABD & PELVIS W/CONTRAST: CPT | Mod: 26

## 2020-05-21 PROCEDURE — 81001 URINALYSIS AUTO W/SCOPE: CPT

## 2020-05-21 PROCEDURE — 87635 SARS-COV-2 COVID-19 AMP PRB: CPT

## 2020-05-21 PROCEDURE — 93010 ELECTROCARDIOGRAM REPORT: CPT

## 2020-05-21 RX ORDER — OXYCODONE HYDROCHLORIDE 5 MG/1
5 TABLET ORAL ONCE
Refills: 0 | Status: DISCONTINUED | OUTPATIENT
Start: 2020-05-21 | End: 2020-05-21

## 2020-05-21 RX ORDER — SODIUM CHLORIDE 9 MG/ML
1000 INJECTION INTRAMUSCULAR; INTRAVENOUS; SUBCUTANEOUS ONCE
Refills: 0 | Status: COMPLETED | OUTPATIENT
Start: 2020-05-21 | End: 2020-05-21

## 2020-05-21 RX ORDER — ONDANSETRON 8 MG/1
4 TABLET, FILM COATED ORAL ONCE
Refills: 0 | Status: DISCONTINUED | OUTPATIENT
Start: 2020-05-21 | End: 2020-05-21

## 2020-05-21 RX ORDER — SODIUM CHLORIDE 9 MG/ML
1000 INJECTION INTRAMUSCULAR; INTRAVENOUS; SUBCUTANEOUS
Refills: 0 | Status: DISCONTINUED | OUTPATIENT
Start: 2020-05-21 | End: 2020-05-21

## 2020-05-21 RX ORDER — HYDROMORPHONE HYDROCHLORIDE 2 MG/ML
0.5 INJECTION INTRAMUSCULAR; INTRAVENOUS; SUBCUTANEOUS
Refills: 0 | Status: DISCONTINUED | OUTPATIENT
Start: 2020-05-21 | End: 2020-05-21

## 2020-05-21 RX ORDER — SODIUM CHLORIDE 9 MG/ML
1000 INJECTION, SOLUTION INTRAVENOUS
Refills: 0 | Status: DISCONTINUED | OUTPATIENT
Start: 2020-05-21 | End: 2020-05-21

## 2020-05-21 RX ORDER — MORPHINE SULFATE 50 MG/1
4 CAPSULE, EXTENDED RELEASE ORAL ONCE
Refills: 0 | Status: DISCONTINUED | OUTPATIENT
Start: 2020-05-21 | End: 2020-05-21

## 2020-05-21 RX ORDER — COLESTIPOL HCL 5 G
4 GRANULES (GRAM) ORAL
Qty: 0 | Refills: 0 | DISCHARGE

## 2020-05-21 RX ORDER — FENTANYL CITRATE 50 UG/ML
50 INJECTION INTRAVENOUS
Refills: 0 | Status: DISCONTINUED | OUTPATIENT
Start: 2020-05-21 | End: 2020-05-21

## 2020-05-21 RX ORDER — ONDANSETRON 8 MG/1
4 TABLET, FILM COATED ORAL ONCE
Refills: 0 | Status: COMPLETED | OUTPATIENT
Start: 2020-05-21 | End: 2020-05-21

## 2020-05-21 RX ADMIN — ONDANSETRON 4 MILLIGRAM(S): 8 TABLET, FILM COATED ORAL at 15:47

## 2020-05-21 RX ADMIN — OXYCODONE HYDROCHLORIDE 5 MILLIGRAM(S): 5 TABLET ORAL at 20:20

## 2020-05-21 RX ADMIN — MORPHINE SULFATE 4 MILLIGRAM(S): 50 CAPSULE, EXTENDED RELEASE ORAL at 15:47

## 2020-05-21 RX ADMIN — Medication 0.5 MILLIGRAM(S): at 15:47

## 2020-05-21 RX ADMIN — SODIUM CHLORIDE 1000 MILLILITER(S): 9 INJECTION INTRAMUSCULAR; INTRAVENOUS; SUBCUTANEOUS at 16:48

## 2020-05-21 RX ADMIN — SODIUM CHLORIDE 1000 MILLILITER(S): 9 INJECTION INTRAMUSCULAR; INTRAVENOUS; SUBCUTANEOUS at 13:02

## 2020-05-21 NOTE — H&P ADULT - NSHPLABSRESULTS_GEN_ALL_CORE
CBC Full  -  ( 21 May 2020 12:48 )  WBC Count : 4.28 K/uL  RBC Count : 4.36 M/uL  Hemoglobin : 12.5 g/dL  Hematocrit : 37.2 %  Platelet Count - Automated : 184 K/uL  Mean Cell Volume : 85.3 fl  Mean Cell Hemoglobin : 28.7 pg  Mean Cell Hemoglobin Concentration : 33.6 gm/dL  Auto Neutrophil # : 2.81 K/uL  Auto Lymphocyte # : 0.92 K/uL  Auto Monocyte # : 0.32 K/uL  Auto Eosinophil # : 0.16 K/uL  Auto Basophil # : 0.05 K/uL  Auto Neutrophil % : 65.6 %  Auto Lymphocyte % : 21.5 %  Auto Monocyte % : 7.5 %  Auto Eosinophil % : 3.7 %  Auto Basophil % : 1.2 %

## 2020-05-21 NOTE — H&P ADULT - HISTORY OF PRESENT ILLNESS
Pt is a 66 y/o F w/  PMHx hypothyroidism,  Rectal ca in 2016 s/p Chemo, Obesity,  GIOVANNA on CPAP, Pna in Dec 2019 who presented with 1 day history of abdominal pain in the epigastric area. Denies any nausea or vomiting. NO chest pain or sob.

## 2020-05-21 NOTE — DISCHARGE NOTE PROVIDER - CARE PROVIDER_API CALL
Keegan Cadet  SURGERY  [address] [place] [address]  Phone: [phone]  Fax: [phone]  Follow Up Time:

## 2020-05-21 NOTE — ED PROVIDER NOTE - NS ED ROS FT
Constitutional: No fever or chills  Eyes: No visual changes  HEENT: No throat pain  CV: No chest pain  Resp: No SOB no cough  GI: No nausea or vomiting. +abd pain.   : No dysuria  MSK: No musculoskeletal pain  Skin: No rash  Neuro: No headache

## 2020-05-21 NOTE — ED ADULT NURSE NOTE - OBJECTIVE STATEMENT
Patient comes in with epigastric abd pain that started yesterday. Patient was sent by PCP for evaluation. Patient states pain is 6/10, non radiating. Patient states pain started after eating lobster yesterday. Denies N/V/D, chest pain, sob.

## 2020-05-21 NOTE — ED PROVIDER NOTE - PROGRESS NOTE DETAILS
Patient seen and evaluated.  Case d/w her PMD, Dr. Kirby, who is concerned for incarcerated hernia.  She has had 1 day of epigastric abd pain with bulge in the area.  PSHx of sleeve gastrectomy, hernia repair, cholecystectomy, repair of diaphragm hernia.  Case d/w her bariatric surgeon Dr. Cadet.  Will check labs and CT and call him back when results are available -Pilo Collier PA-C Patient to be admitted to Dr. Wang for surgery on her hernias found on CT.  Surgical resident at bedside evaluating -Pilo Collier PA-C Patient now to be admitted to [facility] as there was a miscommunication about the care for the patient.  Failed attempt to reduce hernia in the ER by resident -Pilo Collier PA-C

## 2020-05-21 NOTE — ED PROVIDER NOTE - CLINICAL SUMMARY MEDICAL DECISION MAKING FREE TEXT BOX
66 y/o female with a PMHx of arthritis, b/l knee pain, DDD, GERD, lung nodule, obesity, rectal cancer, sleep apnea, thrombocytopenia, vitamin D deficiency, s/p bariatric surgery, s/p cholecystectomy, s/p sleeve gastrectomy presents to the ED c/o abd pain starting yesterday. Pain is central, 6/10, constant and nonradiating. Denies n/v. Pt passing gas from below. Exam with palpable ventral hernia. No overlying erythema or crepitus. Concern for hernia. No signs of SBO,  incarceration or strangulation. Will obtain CT, consult Dr. Cadet, reassess.

## 2020-05-21 NOTE — ED PROVIDER NOTE - OBJECTIVE STATEMENT
66 y/o female with a PMHx of arthritis, b/l knee pain, DDD, GERD, lung nodule, obesity, rectal cancer, sleep apnea, thrombocytopenia, vitamin D deficiency, s/p bariatric surgery, s/p cholecystectomy, s/p sleeve gastrectomy by Dr. Cadet presents to the ED c/o abd pain starting yesterday. Pain is central, 6/10, constant and nonradiating. Denies n/v. Pt passing gas from below. No other complaints at this time. 66 y/o female with a PMHx of arthritis, b/l knee pain, DDD, GERD, lung nodule, obesity, rectal cancer, sleep apnea, thrombocytopenia, vitamin D deficiency, s/p bariatric surgery, s/p cholecystectomy, s/p sleeve gastrectomy by Dr. Cadet presents to the ED c/o abd pain starting yesterday. Pain is central, 6/10, constant and nonradiating. Denies n/v. Pt passing gas from below. No other complaints at this time. didn't take anything for symptoms.

## 2020-05-21 NOTE — ED PROVIDER NOTE - NS_ ATTENDINGSCRIBEDETAILS _ED_A_ED_FT
I, Keegan Cisneros MD,  performed the initial face to face bedside interview with this patient regarding history of present illness, review of symptoms and relevant past medical, social and family history.  I completed an independent physical examination.  I was the initial provider who evaluated this patient.  The history, relevant review of systems, past medical and surgical history, medical decision making, and physical examination was documented by the scribe in my presence and I attest to the accuracy of the documentation.

## 2020-05-21 NOTE — ED ADULT TRIAGE NOTE - CHIEF COMPLAINT QUOTE
sent in from dr. lee office for above umbilical pain/possible hernia, need consult with dr. corrales, pt has history of umbilical hernia HX: was positive covid in 03/2020, positive antibiodies, anal cancer remission

## 2020-05-21 NOTE — H&P ADULT - NSHPPHYSICALEXAM_GEN_ALL_CORE
ICU Vital Signs Last 24 Hrs  T(C): 37.1 (21 May 2020 12:10), Max: 37.1 (21 May 2020 12:10)  T(F): 98.7 (21 May 2020 12:10), Max: 98.7 (21 May 2020 12:10)  HR: 71 (21 May 2020 12:10) (71 - 71)  BP: 145/86 (21 May 2020 12:10) (145/86 - 145/86)  BP(mean): 103 (21 May 2020 12:10) (103 - 103)  ABP: --  ABP(mean): --  RR: 18 (21 May 2020 12:10) (18 - 18)  SpO2: 100% (21 May 2020 12:10) (100% - 100%)    gen : aoo x3  pulm: equal air entry bilaterally  cv: [id number]  abdomen : soft, nondistended, tender to palpation in the epigastric area, non reducible hernia

## 2020-05-21 NOTE — ED ADULT NURSE REASSESSMENT NOTE - NS ED NURSE REASSESS COMMENT FT1
Pt went to OR without any complications. Belongings sent over to security. Safety and comfort measures in place.

## 2020-05-21 NOTE — ASU DISCHARGE PLAN (ADULT/PEDIATRIC) - CARE PROVIDER_API CALL
Keegan Cadet  SURGERY  [address] [place] [address]  Phone: [phone]  Fax: [phone]  Follow Up Time:

## 2020-05-21 NOTE — ED PROVIDER NOTE - PHYSICAL EXAMINATION
Constitutional: NAD AAOx3  Eyes: PERRLA EOMI  Head: Normocephalic atraumatic  Mouth: MMM  Cardiac: regular rate   Resp: Lungs CTAB  GI: Abd s/nt/nd. Palpable ventral hernia. No overlying erythema or crepitus.   Neuro: CN2-12 intact  Skin: No rashes Constitutional: mild distressAAOx3  Eyes: PERRLA EOMI  Head: Normocephalic atraumatic  Mouth: MMM  Cardiac: regular rate   Resp: Lungs CTAB  GI: Abd s/nd. Palpable ventral hernia. No overlying erythema or crepitus. + ttp over hernia no rebound or guarding  Neuro: CN2-12 intact  Skin: No rashes

## 2020-05-21 NOTE — DISCHARGE NOTE PROVIDER - NSDCMRMEDTOKEN_GEN_ALL_CORE_FT
colestipol 1 g oral tablet: 4 tab(s) orally once a day (at bedtime)  levothyroxine 25 mcg (0.025 mg) oral tablet: 1 tab(s) orally once a day  pantoprazole 40 mg oral delayed release tablet: 1 tab(s) orally once a day

## 2020-05-21 NOTE — H&P ADULT - ASSESSMENT
Pt is  admitted w/  CcbcOVID-19  MILLER  Hypoxia to 88% on RA  Fever  Bilateral PNA  GIOVANNA  - s/p 40mg Solumedrol , albuterol MDI, spiriva in the ED,  will cont albuterol MDI, spiriva and 1 dose Solumedrol in the AM  - cont nc O2  - discont nebs from home, also discont CPAP  - discussed with pt OFF Label use of Hydroxychloroquine,  pt would like to try it  - will  add Hydroxychloroquine 400mg po Q 12Hx2 doses, then 12 hrs later 200 mg Q12hr x 4days  - will add statin  - check HgA1C, troponin  - ID consult  - DVT prophylaxis : lovenox  - IMPROVE VTE Individual Risk Assessment    RISK                                                                Points    [  ] Previous VTE                                                  3    [  ] Thrombophilia                                               2    [  ] Lower limb paralysis                                      2        (unable to hold up >15 seconds)      [  ] Current Cancer                                              2         (within 6 months)    [ X ] Immobilization > 24 hrs                                1    [  ] ICU/CCU stay > 24 hours                              1    [ X ] Age > 60                                                      1    IMPROVE VTE Score ____2_____    IMPROVE Score 0-1: Low Risk, No VTE prophylaxis required for most patients, encourage ambulation.   IMPROVE Score 2-3: At risk, pharmacologic VTE prophylaxis is indicated for most patients (in the absence of a contraindication)  IMPROVE Score > or = 4: High Risk, pharmacologic VTE prophylaxis is indicated for most patients (in the absence of a contraindication) pt is admitted for incarcerated epigastric hernia   - npo  - preop labs  - will be booked for OR today for repair of incarcerted hernia   - pain control  - ivf      plan discussed with

## 2020-06-05 DIAGNOSIS — K43.6 OTHER AND UNSPECIFIED VENTRAL HERNIA WITH OBSTRUCTION, WITHOUT GANGRENE: ICD-10-CM

## 2020-06-05 DIAGNOSIS — K21.9 GASTRO-ESOPHAGEAL REFLUX DISEASE WITHOUT ESOPHAGITIS: ICD-10-CM

## 2020-06-05 DIAGNOSIS — M51.36 OTHER INTERVERTEBRAL DISC DEGENERATION, LUMBAR REGION: ICD-10-CM

## 2020-06-05 DIAGNOSIS — Z85.048 PERSONAL HISTORY OF OTHER MALIGNANT NEOPLASM OF RECTUM, RECTOSIGMOID JUNCTION, AND ANUS: ICD-10-CM

## 2020-06-05 DIAGNOSIS — Z98.84 BARIATRIC SURGERY STATUS: ICD-10-CM

## 2020-06-05 DIAGNOSIS — G47.30 SLEEP APNEA, UNSPECIFIED: ICD-10-CM

## 2020-06-05 DIAGNOSIS — E66.9 OBESITY, UNSPECIFIED: ICD-10-CM

## 2020-06-22 ENCOUNTER — APPOINTMENT (OUTPATIENT)
Age: 65
End: 2020-06-22

## 2020-06-26 ENCOUNTER — RESULT REVIEW (OUTPATIENT)
Age: 65
End: 2020-06-26

## 2020-06-26 ENCOUNTER — APPOINTMENT (OUTPATIENT)
Dept: ORTHOPEDIC SURGERY | Facility: CLINIC | Age: 65
End: 2020-06-26
Payer: MEDICAID

## 2020-06-26 ENCOUNTER — APPOINTMENT (OUTPATIENT)
Dept: CT IMAGING | Facility: CLINIC | Age: 65
End: 2020-06-26
Payer: MEDICAID

## 2020-06-26 ENCOUNTER — OUTPATIENT (OUTPATIENT)
Dept: OUTPATIENT SERVICES | Facility: HOSPITAL | Age: 65
LOS: 1 days | End: 2020-06-26
Payer: MEDICAID

## 2020-06-26 VITALS
HEART RATE: 90 BPM | HEIGHT: 63 IN | WEIGHT: 225 LBS | BODY MASS INDEX: 39.87 KG/M2 | SYSTOLIC BLOOD PRESSURE: 130 MMHG | DIASTOLIC BLOOD PRESSURE: 86 MMHG

## 2020-06-26 VITALS — TEMPERATURE: 96.8 F

## 2020-06-26 DIAGNOSIS — Z98.84 BARIATRIC SURGERY STATUS: Chronic | ICD-10-CM

## 2020-06-26 DIAGNOSIS — M17.11 UNILATERAL PRIMARY OSTEOARTHRITIS, RIGHT KNEE: ICD-10-CM

## 2020-06-26 DIAGNOSIS — Z98.890 OTHER SPECIFIED POSTPROCEDURAL STATES: Chronic | ICD-10-CM

## 2020-06-26 DIAGNOSIS — Z87.39 PERSONAL HISTORY OF OTHER DISEASES OF THE MUSCULOSKELETAL SYSTEM AND CONNECTIVE TISSUE: Chronic | ICD-10-CM

## 2020-06-26 DIAGNOSIS — M25.561 PAIN IN RIGHT KNEE: ICD-10-CM

## 2020-06-26 DIAGNOSIS — Z87.19 PERSONAL HISTORY OF OTHER DISEASES OF THE DIGESTIVE SYSTEM: Chronic | ICD-10-CM

## 2020-06-26 DIAGNOSIS — Z98.891 HISTORY OF UTERINE SCAR FROM PREVIOUS SURGERY: Chronic | ICD-10-CM

## 2020-06-26 DIAGNOSIS — Z96.652 PRESENCE OF LEFT ARTIFICIAL KNEE JOINT: Chronic | ICD-10-CM

## 2020-06-26 DIAGNOSIS — Z90.49 ACQUIRED ABSENCE OF OTHER SPECIFIED PARTS OF DIGESTIVE TRACT: Chronic | ICD-10-CM

## 2020-06-26 PROCEDURE — 73700 CT LOWER EXTREMITY W/O DYE: CPT | Mod: 26,RT

## 2020-06-26 PROCEDURE — 76376 3D RENDER W/INTRP POSTPROCES: CPT

## 2020-06-26 PROCEDURE — 99214 OFFICE O/P EST MOD 30 MIN: CPT

## 2020-06-26 PROCEDURE — 73700 CT LOWER EXTREMITY W/O DYE: CPT

## 2020-06-26 PROCEDURE — 76376 3D RENDER W/INTRP POSTPROCES: CPT | Mod: 26

## 2020-06-26 PROCEDURE — 73562 X-RAY EXAM OF KNEE 3: CPT | Mod: RT

## 2020-06-26 NOTE — HISTORY OF PRESENT ILLNESS
[de-identified] : Patient presents today for initial evaluation of right knee pain. She reports she was sitting in a chair home when she slipped out of the chair. This injury occurred about a week ago. She reports since then she's had right lateral knee pain. She reports a good motion. She reports of diffuse discomfort which is primarily anterior and lateral knee. The pain does radiate down the right lateral leg. She has resorted to using a cane again. She is unable to take anti-inflammatories due to stomach reasons. She's been taking Tylenol as needed. She does report discomfort when she stands for extended periods of time. She reports that her previous left knee replacement is doing well. He was not injured during this fall out of a chair. She reports the anterior inferior knee pain she had previously is nearly resolved. No other related complaints\par \par Review of Systems-\par Constitutional: No fever or chills. \par Cardiovascular: No orthopnea or chest pain\par Pulmonary: No shortness of breath. \par GI: No nausea or vomiting or abdominal pain.\par Musculoskeletal: see HPI \par Psychiatric: No anxiety and depression.

## 2020-06-26 NOTE — PHYSICAL EXAM
[de-identified] : The patient appears well nourished and in no apparent distress. The patient is alert and oriented to person, place, and time. Affect and mood appear normal. The head is normocephalic and atraumatic. Skin shows normal turgor with no evidence of eczema or psoriasis. No respiratory distress noted. Sensation grossly intact. MUSCULOSKELETAL:   SEE BELOW\par \par Right knee exam demonstrates no signs of acute trauma. No erythema or ecchymosis. No surgical scars. Alignment is normal. No effusions. Increased habitus. Normal alignment. Passive range of motion is zero to approximately 95° which is limited by body habitus. There is marked medial knee tenderness.  some lateral knee over the tibial plateau. No fibula head tenderness. No calf discomfort. There is some posterior knee discomfort. There is mild laxity with medial/lateral stresses. No anterior or posterior instability. Mild spider veins. No ulcerations. No venous stasis. Normal sensation to light touch [de-identified] : X-rays of the right knee were reviewed. Marked degenerative changes of the medial space narrowing is appreciated. Advanced degenerative changes of the patellofemoral space. No retained hardware. There is concern for possible subtle non-displaced lateral depressed tibial plateau fracture.

## 2020-06-26 NOTE — DISCUSSION/SUMMARY
[de-identified] : X-rays reviewed rotation. There is concern of a possible nondisplaced depressed lateral tibial plateau fracture of the right knee. This was conveyed to the patient. The patient is to be referred for an urgent right knee noncontrast CT scan to evaluate for this injury pattern. The patient is referred to the imaging center today. She is also recommended to begin use of a walker. She will reduce her activities accordingly. Given the patient's leg width, bracing will not be possible. Further treatment will be based on the results of the CT scan.

## 2020-06-26 NOTE — ADDENDUM
[FreeTextEntry1] : Patient obtain a CT scan of her right knee that shows a minimally depressed lateral tibial plateau fracture.  I contacted the patient and discussed the finding with her.  Currently she will minimize weightbearing use a walker.  We will obtain a repeat x-rays in few weeks.  The patient has a fracture on the superimposed severe degenerative joint disease in her right knee.  Patient leg habitus prevented from the use of a hinged or a locked knee brace.  Patient fully understand the limb explanation.  She will comply with my instruction and will follow-up with us in 3 weeks.

## 2020-07-11 ENCOUNTER — OUTPATIENT (OUTPATIENT)
Dept: OUTPATIENT SERVICES | Facility: HOSPITAL | Age: 65
LOS: 1 days | Discharge: HOME | End: 2020-07-11

## 2020-07-11 DIAGNOSIS — Z90.49 ACQUIRED ABSENCE OF OTHER SPECIFIED PARTS OF DIGESTIVE TRACT: Chronic | ICD-10-CM

## 2020-07-11 DIAGNOSIS — Z98.890 OTHER SPECIFIED POSTPROCEDURAL STATES: Chronic | ICD-10-CM

## 2020-07-11 DIAGNOSIS — Z98.84 BARIATRIC SURGERY STATUS: Chronic | ICD-10-CM

## 2020-07-11 DIAGNOSIS — E53.8 DEFICIENCY OF OTHER SPECIFIED B GROUP VITAMINS: ICD-10-CM

## 2020-07-11 DIAGNOSIS — Z87.19 PERSONAL HISTORY OF OTHER DISEASES OF THE DIGESTIVE SYSTEM: Chronic | ICD-10-CM

## 2020-07-11 DIAGNOSIS — Z98.891 HISTORY OF UTERINE SCAR FROM PREVIOUS SURGERY: Chronic | ICD-10-CM

## 2020-07-11 DIAGNOSIS — Z87.39 PERSONAL HISTORY OF OTHER DISEASES OF THE MUSCULOSKELETAL SYSTEM AND CONNECTIVE TISSUE: Chronic | ICD-10-CM

## 2020-07-11 DIAGNOSIS — Z96.652 PRESENCE OF LEFT ARTIFICIAL KNEE JOINT: Chronic | ICD-10-CM

## 2020-07-17 ENCOUNTER — APPOINTMENT (OUTPATIENT)
Dept: ORTHOPEDIC SURGERY | Facility: CLINIC | Age: 65
End: 2020-07-17
Payer: MEDICAID

## 2020-07-17 ENCOUNTER — APPOINTMENT (OUTPATIENT)
Age: 65
End: 2020-07-17

## 2020-07-17 VITALS — TEMPERATURE: 97.5 F

## 2020-07-17 PROCEDURE — 73562 X-RAY EXAM OF KNEE 3: CPT | Mod: RT

## 2020-07-17 PROCEDURE — 99214 OFFICE O/P EST MOD 30 MIN: CPT

## 2020-07-17 NOTE — PHYSICAL EXAM
[de-identified] : The patient appears well nourished and in no apparent distress. The patient is alert and oriented to person, place, and time. Affect and mood appear normal. The head is normocephalic and atraumatic. Skin shows normal turgor with no evidence of eczema or psoriasis. No respiratory distress noted. Sensation grossly intact. MUSCULOSKELETAL:   SEE BELOW\par \par Right knee exam (unchanged) demonstrates no signs of acute trauma. No erythema or ecchymosis. No surgical scars. Aperture is normal. No effusions. Increased habitus. Normal alignment. Passive range of motion is zero to approximately 95° which is limited by body habitus. There is marked medial knee tenderness. No specific lateral knee or fibula head tenderness. No calf discomfort. There is some posterior knee discomfort. There is mild laxity with medial/lateral stresses. No anterior or posterior instability. Mild spider veins. No ulcerations. No venous stasis. Normal sensation to light touch [de-identified] : X-ray of the right knee was reviewed compared to the previous x-ray. There is no advancement in the lateral compartment tibial plateau fracture disease diagnosed.

## 2020-07-17 NOTE — HISTORY OF PRESENT ILLNESS
[de-identified] : Patient presents today for evaluation of her right knee tibial plateau fracture. She's been managed nonoperatively. She reports that her pain has started to improve. She continues to limit her activities. She continues to him that the assistance of a cane. Overall she is progressing. No new injuries are reported.\par \par Review of Systems-\par Constitutional: No fever or chills. \par Cardiovascular: No orthopnea or chest pain\par Pulmonary: No shortness of breath. \par GI: No nausea or vomiting or abdominal pain.\par Musculoskeletal: see HPI \par Psychiatric: No anxiety and depression.

## 2020-07-17 NOTE — DISCUSSION/SUMMARY
[de-identified] : Today's x-rays were shown to the patient. The fracture pattern is stable at this time. She will continue to restrict her activities for the next month. She'll be seen back for repeat evaluation at that time. If her pain should increase, she will reduce activities and take over-the-counter pain medicine. She's encouraged continued use of the cane. The patient demonstrated understanding of the treatment plan. All questions are answered to the patient's satisfaction.

## 2020-07-27 ENCOUNTER — APPOINTMENT (OUTPATIENT)
Dept: INTERNAL MEDICINE | Facility: CLINIC | Age: 65
End: 2020-07-27
Payer: MEDICAID

## 2020-07-27 ENCOUNTER — APPOINTMENT (OUTPATIENT)
Dept: INTERNAL MEDICINE | Facility: HOSPITAL | Age: 65
End: 2020-07-27

## 2020-07-27 ENCOUNTER — APPOINTMENT (OUTPATIENT)
Dept: INTERNAL MEDICINE | Facility: CLINIC | Age: 65
End: 2020-07-27

## 2020-07-27 VITALS
OXYGEN SATURATION: 96 % | RESPIRATION RATE: 16 BRPM | SYSTOLIC BLOOD PRESSURE: 134 MMHG | HEART RATE: 83 BPM | HEIGHT: 63 IN | BODY MASS INDEX: 39.69 KG/M2 | DIASTOLIC BLOOD PRESSURE: 82 MMHG | WEIGHT: 224 LBS | TEMPERATURE: 98.9 F

## 2020-07-27 DIAGNOSIS — G47.33 OBSTRUCTIVE SLEEP APNEA (ADULT) (PEDIATRIC): ICD-10-CM

## 2020-07-27 PROCEDURE — 99213 OFFICE O/P EST LOW 20 MIN: CPT

## 2020-07-27 RX ORDER — METHYLPREDNISOLONE 4 MG/1
4 TABLET ORAL
Qty: 1 | Refills: 0 | Status: DISCONTINUED | COMMUNITY
Start: 2020-02-10 | End: 2020-07-27

## 2020-07-27 NOTE — PHYSICAL EXAM
[No Acute Distress] : no acute distress [Normal Oropharynx] : normal oropharynx [No Neck Mass] : no neck mass [Normal Appearance] : normal appearance [Normal S1, S2] : normal s1, s2 [Normal Rate/Rhythm] : normal rate/rhythm [No Murmurs] : no murmurs [No Resp Distress] : no resp distress [Clear to Auscultation Bilaterally] : clear to auscultation bilaterally [Benign] : benign [No Abnormalities] : no abnormalities [Normal Gait] : normal gait [No Cyanosis] : no cyanosis [No Clubbing] : no clubbing [Normal Color/ Pigmentation] : normal color/ pigmentation [FROM] : FROM [No Edema] : no edema [Oriented x3] : oriented x3 [No Focal Deficits] : no focal deficits [Normal Affect] : normal affect

## 2020-07-27 NOTE — ASSESSMENT
[FreeTextEntry1] : Ms. Blankenship presents for a followup evaluation. She has a history of obstructive sleep apnea. Patient will continue on CPAP 10 cm of pressure. Followup in 6 months.

## 2020-07-27 NOTE — HISTORY OF PRESENT ILLNESS
[TextBox_4] : Ms. Blankenship presents for followup evaluation. She has a history of obstructive sleep apnea. She continues on CPAP at 10 cm of pressure. Patient denies any symptoms of daytime tiredness and difficulty with cognition during the day. She has been compliant with CPAP therapy.

## 2020-08-14 ENCOUNTER — APPOINTMENT (OUTPATIENT)
Dept: ORTHOPEDIC SURGERY | Facility: CLINIC | Age: 65
End: 2020-08-14
Payer: MEDICAID

## 2020-08-14 VITALS — BODY MASS INDEX: 38.62 KG/M2 | WEIGHT: 218 LBS | HEIGHT: 63 IN

## 2020-08-14 VITALS — TEMPERATURE: 97.5 F

## 2020-08-14 DIAGNOSIS — S82.143A DISPLACED BICONDYLAR FRACTURE OF UNSPECIFIED TIBIA, INITIAL ENCOUNTER FOR CLOSED FRACTURE: ICD-10-CM

## 2020-08-14 PROCEDURE — 73562 X-RAY EXAM OF KNEE 3: CPT | Mod: RT

## 2020-08-14 PROCEDURE — 99214 OFFICE O/P EST MOD 30 MIN: CPT

## 2020-08-14 NOTE — REASON FOR VISIT
[Follow-Up Visit] : a follow-up visit for [Knee Pain] : knee pain [FreeTextEntry2] : Right knee injured 6/16/2020.

## 2020-08-14 NOTE — HISTORY OF PRESENT ILLNESS
[de-identified] : Patient presents today for evaluation of her right knee tibial plateau fracture and a left knee replacement May 2018. She's been managed nonoperatively for the tibial plateau fracture. She reports that her pain has improved from last visit. She is working. She continues to use that the assistance of a cane. Overall she is progressing. No new injuries are reported.\par  \par Review of Systems-\par Constitutional: No fever or chills. \par Cardiovascular: No orthopnea or chest pain\par Pulmonary: No shortness of breath. \par GI: No nausea or vomiting or abdominal pain.\par Musculoskeletal: see HPI \par Psychiatric: No anxiety and depression.

## 2020-08-14 NOTE — DISCUSSION/SUMMARY
[de-identified] : Patient was demonstrated the x-rays. She is improved from last visit. As for the right knee tibial plateau fracture, it appears to be healed. She notes that she has moderate to severe arthritis of the knee. She'll continue to follow. If the pain worsens, she will return back to us as discussed injections versus knee replacement. As for the patient's left knee, she does have some anterior knee tenderness. She is otherwise doing well. She'll be seen back for routine followup as scheduled. No other further interventions are necessary today.

## 2020-08-14 NOTE — PHYSICAL EXAM
[de-identified] : The patient appears well nourished and in no apparent distress. The patient is alert and oriented to person, place, and time. Affect and mood appear normal. The head is normocephalic and atraumatic. Skin shows normal turgor with no evidence of eczema or psoriasis. No respiratory distress noted. Sensation grossly intact. MUSCULOSKELETAL:   SEE BELOW\par \par Right knee exam (unchanged) demonstrates no signs of acute trauma. No erythema or ecchymosis. No surgical scars.  No effusions. Increased habitus. Normal alignment. Passive range of motion is zero to approximately 95° which is limited by body habitus. There is minimal medial knee tenderness. No specific lateral knee or fibula head tenderness. No calf discomfort. There is some posterior knee discomfort. There is mild laxity with medial/lateral stresses. No anterior or posterior instability. Mild spider veins. No ulcerations. No venous stasis. Normal sensation to light touch.\par \par Left knee exam demonstrates normal alignment. Increased body habitus. There is mild anterior knee tenderness. No crepitus. No calf tenderness. Passive range of motion is 0-95° of flexion. No instability. [de-identified] : X-rays of the left knee revealed good implants are in good position. Normal line. No fractures.\par \par X-rays of the right knee were reviewed. No further progression of the tibial plateau fracture.

## 2020-08-17 ENCOUNTER — TRANSCRIPTION ENCOUNTER (OUTPATIENT)
Age: 65
End: 2020-08-17

## 2020-08-18 ENCOUNTER — APPOINTMENT (OUTPATIENT)
Dept: CT IMAGING | Facility: CLINIC | Age: 65
End: 2020-08-18
Payer: MEDICAID

## 2020-08-18 ENCOUNTER — OUTPATIENT (OUTPATIENT)
Dept: OUTPATIENT SERVICES | Facility: HOSPITAL | Age: 65
LOS: 1 days | End: 2020-08-18
Payer: MEDICAID

## 2020-08-18 DIAGNOSIS — Z98.890 OTHER SPECIFIED POSTPROCEDURAL STATES: Chronic | ICD-10-CM

## 2020-08-18 DIAGNOSIS — Z90.49 ACQUIRED ABSENCE OF OTHER SPECIFIED PARTS OF DIGESTIVE TRACT: Chronic | ICD-10-CM

## 2020-08-18 DIAGNOSIS — Z00.8 ENCOUNTER FOR OTHER GENERAL EXAMINATION: ICD-10-CM

## 2020-08-18 DIAGNOSIS — Z98.84 BARIATRIC SURGERY STATUS: Chronic | ICD-10-CM

## 2020-08-18 DIAGNOSIS — Z87.19 PERSONAL HISTORY OF OTHER DISEASES OF THE DIGESTIVE SYSTEM: Chronic | ICD-10-CM

## 2020-08-18 DIAGNOSIS — Z96.652 PRESENCE OF LEFT ARTIFICIAL KNEE JOINT: Chronic | ICD-10-CM

## 2020-08-18 DIAGNOSIS — Z87.39 PERSONAL HISTORY OF OTHER DISEASES OF THE MUSCULOSKELETAL SYSTEM AND CONNECTIVE TISSUE: Chronic | ICD-10-CM

## 2020-08-18 DIAGNOSIS — Z98.891 HISTORY OF UTERINE SCAR FROM PREVIOUS SURGERY: Chronic | ICD-10-CM

## 2020-08-18 PROCEDURE — 74177 CT ABD & PELVIS W/CONTRAST: CPT | Mod: 26

## 2020-08-18 PROCEDURE — 74177 CT ABD & PELVIS W/CONTRAST: CPT

## 2020-09-16 ENCOUNTER — APPOINTMENT (OUTPATIENT)
Dept: ORTHOPEDIC SURGERY | Facility: CLINIC | Age: 65
End: 2020-09-16
Payer: MEDICAID

## 2020-09-16 PROCEDURE — 76000 FLUOROSCOPY <1 HR PHYS/QHP: CPT | Mod: 59,LT

## 2020-09-16 PROCEDURE — 20600 DRAIN/INJ JOINT/BURSA W/O US: CPT | Mod: LT

## 2020-09-16 PROCEDURE — 20550 NJX 1 TENDON SHEATH/LIGAMENT: CPT | Mod: 59,LT

## 2020-09-16 PROCEDURE — 99213 OFFICE O/P EST LOW 20 MIN: CPT | Mod: 25

## 2020-09-16 NOTE — HISTORY OF PRESENT ILLNESS
[FreeTextEntry1] : 2/10/20: 64-year-old female presents for evaluation of pain at the base of the left thumb as well as a right middle trigger finger. Her symptoms have been present for several months she denies specific trauma.  Between the base of the thumb is gone aching in nature worse with activity and improved with rest. The pain radiates circumferentially above, as well as to the wrist. She also complains of dull pain at the A1 pulley of the right middle finger with locking of the PIP joint on flexion and extension.\par \par 09/16/2020: The patient returns to the office today for a repeat evaluation of her right middle trigger finger and her left thumb arthritis. Her right middle trigger finger symptoms did not improve much after her last cortisone injection. She continues to have moderate dull and achy pain to the left thumb base. She presents today for further treatment.

## 2020-09-16 NOTE — CONSULT LETTER
[Consult Closing:] : Thank you very much for allowing me to participate in the care of this patient.  If you have any questions, please do not hesitate to contact me. [Consult Letter:] : I had the pleasure of evaluating your patient, [unfilled]. [Please see my note below.] : Please see my note below. [Sincerely,] : Sincerely, [FreeTextEntry3] : Dr. La Villalta\par Orthopaedic Surgery\par Hand & Upper Extremity.\par

## 2020-09-16 NOTE — REASON FOR VISIT
[Follow-Up Visit] : a follow-up visit for [Hand Pain] : hand pain [FreeTextEntry2] : Bilateral hand pain.

## 2020-09-16 NOTE — PHYSICAL EXAM
[Normal Finger/nose] : finger to nose coordination [Normal] : no peripheral adenopathy appreciated [de-identified] : There is tenderness over the left basal joint with a positive grind test, no shoulder deformity. There is a positive crank test. There is swelling appreciated over the affected CMC joint which is not present on the opposite side. There is no evidence of infection or lymphadenopathy bilaterally to the level of the elbows There is no evidence of MCP hyperextension bilaterally. There is a negative Finkelstein's test There is no tenderness over the A-1 pulleys bilaterally. 5/5 stregnth bilaterally. \par \par The wrists have a symmetric and full range of motion bilaterally with no pain upon forced flexion, extension, pronation and supination. There is no tenderness over the scaphoid scapholunate region ligaments and no tenderness of the TFCC bilaterally. There is no tenderness of the pisotriquetral hamate hook. The second through fifth CMC his is stable and nontender bilaterally.\par There is a negative carpal tunnel compression test or Tinel's bilaterally.  \par \par Right middle finger:\par Skin intact no swelling no erythema no ecchymosis\par tenderness to palpation over the A1 pulley with active triggering on flexion and extension\par No other digits affected\par Full range of motion of the digits without pain\par sensation intact distally, capillary refill less than 2 seconds [de-identified] : tierar

## 2020-09-16 NOTE — ASSESSMENT
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 The nature of these conditions were described at length with the patient she verbalized understanding. Today a cortisone injection was administered to the A1 pulley of the right middle finger. The patient tolerated the procedure well. Also a cortisone injection was administered to the left basal joint under fluoroscopic guidance. The patient tolerated that procedure well. She will continue with activity as tolerated and return to the office as needed. I would like to start the patient on an anti-inflammatory, however she was recently a gastric bypass patient, so we will speak with her surgeon to see if anti-inflammatory therapy would be contraindicated. The patient is in agreement with this plan.

## 2020-09-16 NOTE — PROCEDURE
[FreeTextEntry1] : My impression is that this patient has stenosing tenosynovitis of the finger, more commonly known as a trigger finger.  I recommended that the patient undergo a trigger finger injection. The risks, benefits, and alternatives were discussed with the patient in detail. This included (but was not limited to) pain, infection, subcutaneous atrophy, skin depigmentation, etc... They agreed to undergo the steroid injection. Under informed consent and sterile conditions, 1/2 cc of 1% plain lidocaine  and 1 cc of Dexamethasone 4mg was precisely injected into the patient's right middle finger.   A sterile Band-Aid was placed.  It is my hope that this significantly alleviates the patient's symptoms.\par \par Injection: Thumb Left\par \par There was a discussion with the patient regarding this procedure and all questions/concerns were answered.  All of the risks, benefits and alternatives were discussed at length.  These include but are not limited to bleeding, infection, and allergic reaction.  Alcohol was used to clean, sterilize and prep the skin at the injection site on the dorsal aspect of the wrist.  A 27G needle was used to inject 0.6cc of 1% lidocaine to the injection site.  A 25G needle was used to inject 1cc of 4mg/mL dexamethasone into the wrist under fluoroscopic guidance.  A sterile bandage was applied to the site of the injection.  The patient tolerated the procedure well and there were no complications.\par

## 2020-09-25 NOTE — ASU PATIENT PROFILE, ADULT - NS PRO AD ANY ON CHART
Yes
Orientation to room/Side rails x 2 or 4 up, assess large gaps, such that a patient could get extremity or other body part entrapped, use additional safety procedures/Bed in low position, brakes on

## 2020-10-07 ENCOUNTER — OUTPATIENT (OUTPATIENT)
Dept: OUTPATIENT SERVICES | Facility: HOSPITAL | Age: 65
LOS: 1 days | Discharge: HOME | End: 2020-10-07

## 2020-10-07 DIAGNOSIS — E53.8 DEFICIENCY OF OTHER SPECIFIED B GROUP VITAMINS: ICD-10-CM

## 2020-10-07 DIAGNOSIS — Z87.19 PERSONAL HISTORY OF OTHER DISEASES OF THE DIGESTIVE SYSTEM: Chronic | ICD-10-CM

## 2020-10-07 DIAGNOSIS — Z90.49 ACQUIRED ABSENCE OF OTHER SPECIFIED PARTS OF DIGESTIVE TRACT: Chronic | ICD-10-CM

## 2020-10-07 DIAGNOSIS — Z98.890 OTHER SPECIFIED POSTPROCEDURAL STATES: Chronic | ICD-10-CM

## 2020-10-07 DIAGNOSIS — Z96.652 PRESENCE OF LEFT ARTIFICIAL KNEE JOINT: Chronic | ICD-10-CM

## 2020-10-07 DIAGNOSIS — Z87.39 PERSONAL HISTORY OF OTHER DISEASES OF THE MUSCULOSKELETAL SYSTEM AND CONNECTIVE TISSUE: Chronic | ICD-10-CM

## 2020-10-07 DIAGNOSIS — Z98.891 HISTORY OF UTERINE SCAR FROM PREVIOUS SURGERY: Chronic | ICD-10-CM

## 2020-10-07 DIAGNOSIS — Z98.84 BARIATRIC SURGERY STATUS: Chronic | ICD-10-CM

## 2020-10-12 ENCOUNTER — APPOINTMENT (OUTPATIENT)
Age: 65
End: 2020-10-12

## 2020-10-19 ENCOUNTER — RX RENEWAL (OUTPATIENT)
Age: 65
End: 2020-10-19

## 2020-10-28 DIAGNOSIS — Z96.652 AFTERCARE FOLLOWING JOINT REPLACEMENT SURGERY: ICD-10-CM

## 2020-10-28 DIAGNOSIS — Z87.39 PERSONAL HISTORY OF OTHER DISEASES OF THE MUSCULOSKELETAL SYSTEM AND CONNECTIVE TISSUE: ICD-10-CM

## 2020-10-28 DIAGNOSIS — R33.9 RETENTION OF URINE, UNSPECIFIED: ICD-10-CM

## 2020-10-28 DIAGNOSIS — S43.401A UNSPECIFIED SPRAIN OF RIGHT SHOULDER JOINT, INITIAL ENCOUNTER: ICD-10-CM

## 2020-10-28 DIAGNOSIS — Z20.828 CONTACT WITH AND (SUSPECTED) EXPOSURE TO OTHER VIRAL COMMUNICABLE DISEASES: ICD-10-CM

## 2020-10-28 DIAGNOSIS — M54.12 RADICULOPATHY, CERVICAL REGION: ICD-10-CM

## 2020-10-28 DIAGNOSIS — Z87.2 PERSONAL HISTORY OF DISEASES OF THE SKIN AND SUBCUTANEOUS TISSUE: ICD-10-CM

## 2020-10-28 DIAGNOSIS — M25.551 PAIN IN RIGHT HIP: ICD-10-CM

## 2020-10-28 DIAGNOSIS — M25.562 PAIN IN LEFT KNEE: ICD-10-CM

## 2020-10-28 DIAGNOSIS — I78.1 NEVUS, NON-NEOPLASTIC: ICD-10-CM

## 2020-10-28 DIAGNOSIS — Z47.1 AFTERCARE FOLLOWING JOINT REPLACEMENT SURGERY: ICD-10-CM

## 2020-10-28 DIAGNOSIS — Z91.81 HISTORY OF FALLING: ICD-10-CM

## 2020-10-28 DIAGNOSIS — Z86.2 PERSONAL HISTORY OF DISEASES OF THE BLOOD AND BLOOD-FORMING ORGANS AND CERTAIN DISORDERS INVOLVING THE IMMUNE MECHANISM: ICD-10-CM

## 2020-10-28 DIAGNOSIS — M70.62 TROCHANTERIC BURSITIS, LEFT HIP: ICD-10-CM

## 2020-10-28 DIAGNOSIS — Z86.19 PERSONAL HISTORY OF OTHER INFECTIOUS AND PARASITIC DISEASES: ICD-10-CM

## 2020-10-28 DIAGNOSIS — M17.12 UNILATERAL PRIMARY OSTEOARTHRITIS, LEFT KNEE: ICD-10-CM

## 2020-10-28 DIAGNOSIS — M54.6 PAIN IN THORACIC SPINE: ICD-10-CM

## 2020-10-29 ENCOUNTER — RESULT REVIEW (OUTPATIENT)
Age: 65
End: 2020-10-29

## 2020-10-29 ENCOUNTER — APPOINTMENT (OUTPATIENT)
Age: 65
End: 2020-10-29
Payer: MEDICAID

## 2020-10-29 VITALS
BODY MASS INDEX: 40.57 KG/M2 | SYSTOLIC BLOOD PRESSURE: 142 MMHG | HEART RATE: 83 BPM | WEIGHT: 229 LBS | HEIGHT: 63 IN | RESPIRATION RATE: 16 BRPM | TEMPERATURE: 97.7 F | DIASTOLIC BLOOD PRESSURE: 84 MMHG

## 2020-10-29 LAB
BASOPHILS # BLD AUTO: 0.03 K/UL — SIGNIFICANT CHANGE UP (ref 0–0.2)
BASOPHILS NFR BLD AUTO: 0.6 % — SIGNIFICANT CHANGE UP (ref 0–2)
EOSINOPHIL # BLD AUTO: 0.11 K/UL — SIGNIFICANT CHANGE UP (ref 0–0.5)
EOSINOPHIL NFR BLD AUTO: 2.4 % — SIGNIFICANT CHANGE UP (ref 0–6)
HCT VFR BLD CALC: 36.9 % — SIGNIFICANT CHANGE UP (ref 34.5–45)
HGB BLD-MCNC: 12.3 G/DL — SIGNIFICANT CHANGE UP (ref 11.5–15.5)
IMM GRANULOCYTES NFR BLD AUTO: 0.4 % — SIGNIFICANT CHANGE UP (ref 0–1.5)
LYMPHOCYTES # BLD AUTO: 0.95 K/UL — LOW (ref 1–3.3)
LYMPHOCYTES # BLD AUTO: 20.6 % — SIGNIFICANT CHANGE UP (ref 13–44)
MCHC RBC-ENTMCNC: 28.1 PG — SIGNIFICANT CHANGE UP (ref 27–34)
MCHC RBC-ENTMCNC: 33.3 GM/DL — SIGNIFICANT CHANGE UP (ref 32–36)
MCV RBC AUTO: 84.2 FL — SIGNIFICANT CHANGE UP (ref 80–100)
MONOCYTES # BLD AUTO: 0.44 K/UL — SIGNIFICANT CHANGE UP (ref 0–0.9)
MONOCYTES NFR BLD AUTO: 9.5 % — SIGNIFICANT CHANGE UP (ref 2–14)
NEUTROPHILS # BLD AUTO: 3.07 K/UL — SIGNIFICANT CHANGE UP (ref 1.8–7.4)
NEUTROPHILS NFR BLD AUTO: 66.5 % — SIGNIFICANT CHANGE UP (ref 43–77)
NRBC # BLD: 0 /100 WBCS — SIGNIFICANT CHANGE UP (ref 0–0)
PLATELET # BLD AUTO: 172 K/UL — SIGNIFICANT CHANGE UP (ref 150–400)
RBC # BLD: 4.38 M/UL — SIGNIFICANT CHANGE UP (ref 3.8–5.2)
RBC # FLD: 13.4 % — SIGNIFICANT CHANGE UP (ref 10.3–14.5)
WBC # BLD: 4.62 K/UL — SIGNIFICANT CHANGE UP (ref 3.8–10.5)
WBC # FLD AUTO: 4.62 K/UL — SIGNIFICANT CHANGE UP (ref 3.8–10.5)

## 2020-10-29 PROCEDURE — 99214 OFFICE O/P EST MOD 30 MIN: CPT

## 2020-10-29 RX ORDER — PREDNISONE 5 MG/1
5 TABLET ORAL
Qty: 30 | Refills: 0 | Status: DISCONTINUED | COMMUNITY
Start: 2020-03-20 | End: 2020-10-29

## 2020-10-29 RX ORDER — PREDNISONE 20 MG/1
20 TABLET ORAL DAILY
Qty: 18 | Refills: 0 | Status: DISCONTINUED | COMMUNITY
Start: 2020-09-16 | End: 2020-10-29

## 2020-10-29 RX ORDER — COLESTIPOL HYDROCHLORIDE 1 G/1
1 TABLET, FILM COATED ORAL DAILY
Refills: 0 | Status: ACTIVE | COMMUNITY
Start: 2019-11-27

## 2020-10-29 RX ORDER — CHOLESTYRAMINE 4 G/9G
4 POWDER, FOR SUSPENSION ORAL
Refills: 0 | Status: DISCONTINUED | COMMUNITY
End: 2020-10-29

## 2020-10-29 RX ORDER — DOXYCYCLINE HYCLATE 100 MG/1
100 CAPSULE ORAL
Qty: 20 | Refills: 0 | Status: DISCONTINUED | COMMUNITY
Start: 2020-03-19 | End: 2020-10-29

## 2020-10-29 RX ORDER — LEVOTHYROXINE SODIUM 0.03 MG/1
25 TABLET ORAL DAILY
Refills: 0 | Status: ACTIVE | COMMUNITY

## 2020-10-29 RX ORDER — PREDNISONE 20 MG/1
20 TABLET ORAL DAILY
Qty: 18 | Refills: 0 | Status: DISCONTINUED | COMMUNITY
Start: 2020-09-24 | End: 2020-10-29

## 2020-10-29 NOTE — PHYSICAL EXAM
[Fully active, able to carry on all pre-disease performance without restriction] : Status 0 - Fully active, able to carry on all pre-disease performance without restriction [Obese] : obese [Normal] : normal appearance, no rash, nodules, vesicles, ulcers, erythema [de-identified] : chronic lower extremity edema [de-identified] : scars post robotic surgery: incarcerated incisional hernia repair (5/21/20)

## 2020-10-29 NOTE — ASSESSMENT
[FreeTextEntry1] : Ms. MAGANA 's questions were answered to her satisfaction. She expressed her understanding and willingness to comply with the above recommendations, and  will return to the office in 1 year.\par

## 2020-10-29 NOTE — HISTORY OF PRESENT ILLNESS
[de-identified] : 65 F with stage II anal squamous cell carcinoma diagnosed 2015.\par \par CASE SYNOPSIS:\par 12/2014- to rectal pressure, bleeding, pain with defecation.\par 2/12/15- sigmoidoscopy: Mass in the proximal anal canal and distal rectum, occupying the anterior one third of the circumference.\par Biopsy: Squamous cell carcinoma.\par CT CAP: Enlarged perirectal lymph nodes; no distant metastases. \par 3/10/15- PET/CT: Few stop centimeter pelvic lymph nodes too small to characterize. \par Indeterminate hypermetabolic right inguinal lymph node.\par 3/16 - 5/2015: : concurrent chemotherapy/XRT with mitomycin C/5 FU.\par 8/10/15 - CT scan with stable lung nodules and resolution of perirectal lymphadenopathy.\par 9/29/17 - CT abdomen/pelvis- IZABELLA.\par 8/2018 - last colonoscopy  with Dr. Silvio Orlando- reportedly negative.\par  [FreeTextEntry1] : expectant surveillance\par \par  [de-identified] : Ms. MAGANA is returning for follow up; last seen in October 2019. Patient reports no new symptoms. Last CT C/A/P performed in 2017- IZABELLA . Appears stable clinically, non-toxic. Still complaining of left thumb arthritic pain. Hematologic picture improved, normalized.On May 21, 2020 she had hernia repair ( incarcerated incisional hernia), and sustained a fall injuring RLE; 9 weeks off RLE. Compliant with medication; list reviewed. Reports no recent infections, fevers, no changes in weight; appetite preserved. Continues to work as hairdresser. Has follow up with orthopedics and gynecologist.  Due for colonoscopy April 2022.

## 2020-10-30 LAB
FERRITIN SERPL-MCNC: 116 NG/ML — SIGNIFICANT CHANGE UP (ref 15–150)
FOLATE SERPL-MCNC: 9.6 NG/ML — SIGNIFICANT CHANGE UP
VIT B12 SERPL-MCNC: 275 PG/ML — SIGNIFICANT CHANGE UP (ref 232–1245)

## 2020-11-07 ENCOUNTER — TRANSCRIPTION ENCOUNTER (OUTPATIENT)
Age: 65
End: 2020-11-07

## 2020-11-10 ENCOUNTER — APPOINTMENT (OUTPATIENT)
Dept: NEUROLOGY | Facility: CLINIC | Age: 65
End: 2020-11-10
Payer: MEDICAID

## 2020-11-10 VITALS
HEIGHT: 63 IN | WEIGHT: 225 LBS | TEMPERATURE: 97.5 F | SYSTOLIC BLOOD PRESSURE: 128 MMHG | HEART RATE: 80 BPM | BODY MASS INDEX: 39.87 KG/M2 | DIASTOLIC BLOOD PRESSURE: 80 MMHG

## 2020-11-10 DIAGNOSIS — M47.812 SPONDYLOSIS W/OUT MYELOPATHY OR RADICULOPATHY, CERVICAL REGION: ICD-10-CM

## 2020-11-10 PROCEDURE — 99072 ADDL SUPL MATRL&STAF TM PHE: CPT

## 2020-11-10 PROCEDURE — 99204 OFFICE O/P NEW MOD 45 MIN: CPT

## 2020-11-10 NOTE — PHYSICAL EXAM
[General Appearance - Alert] : alert [General Appearance - In No Acute Distress] : in no acute distress [Oriented To Time, Place, And Person] : oriented to person, place, and time [Impaired Insight] : insight and judgment were intact [Affect] : the affect was normal [Person] : oriented to person [Place] : oriented to place [Time] : oriented to time [Short Term Intact] : short term memory impaired [Concentration Intact] : normal concentrating ability [Visual Intact] : visual attention was ~T not ~L decreased [Naming Objects] : no difficulty naming common objects [Repeating Phrases] : no difficulty repeating a phrase [Writing A Sentence] : no difficulty writing a sentence [Fluency] : fluency intact [Comprehension] : comprehension intact [Reading] : reading intact [Past History] : adequate knowledge of personal past history [Cranial Nerves Optic (II)] : visual acuity intact bilaterally,  visual fields full to confrontation, pupils equal round and reactive to light [Cranial Nerves Oculomotor (III)] : extraocular motion intact [Cranial Nerves Trigeminal (V)] : facial sensation intact symmetrically [Cranial Nerves Facial (VII)] : face symmetrical [Cranial Nerves Vestibulocochlear (VIII)] : hearing was intact bilaterally [Cranial Nerves Glossopharyngeal (IX)] : tongue and palate midline [Cranial Nerves Accessory (XI - Cranial And Spinal)] : head turning and shoulder shrug symmetric [Cranial Nerves Hypoglossal (XII)] : there was no tongue deviation with protrusion [Motor Strength] : muscle strength was normal in all four extremities [No Muscle Atrophy] : normal bulk in all four extremities [Sensation Tactile Decrease] : light touch was intact [Limited Balance] : the patient's balance was impaired [Past-pointing] : there was no past-pointing [Tremor] : no tremor present [1+] : Patella left 1+ [0] : Ankle jerk left 0 [Plantar Reflex Right Only] : normal on the right [Plantar Reflex Left Only] : normal on the left [FreeTextEntry6] : Rt hand  4/5  [FreeTextEntry8] : Antalgic gait [Sclera] : the sclera and conjunctiva were normal [PERRL With Normal Accommodation] : pupils were equal in size, round, reactive to light, with normal accommodation [Extraocular Movements] : extraocular movements were intact [Outer Ear] : the ears and nose were normal in appearance [Oropharynx] : the oropharynx was normal [Neck Appearance] : the appearance of the neck was normal [Neck Cervical Mass (___cm)] : no neck mass was observed [Jugular Venous Distention Increased] : there was no jugular-venous distention [Thyroid Diffuse Enlargement] : the thyroid was not enlarged [Thyroid Nodule] : there were no palpable thyroid nodules [Auscultation Breath Sounds / Voice Sounds] : lungs were clear to auscultation bilaterally [Heart Rate And Rhythm] : heart rate was normal and rhythm regular [Heart Sounds] : normal S1 and S2 [Heart Sounds Gallop] : no gallops [Murmurs] : no murmurs [Heart Sounds Pericardial Friction Rub] : no pericardial rub [Full Pulse] : the pedal pulses are present [Edema] : there was no peripheral edema [Bowel Sounds] : normal bowel sounds [Abdomen Soft] : soft [Abdomen Tenderness] : non-tender [Abdomen Mass (___ Cm)] : no abdominal mass palpated [No CVA Tenderness] : no ~M costovertebral angle tenderness [No Spinal Tenderness] : no spinal tenderness [Nail Clubbing] : no clubbing  or cyanosis of the fingernails [Musculoskeletal - Swelling] : no joint swelling seen [Motor Tone] : muscle strength and tone were normal [FreeTextEntry1] : Antalgic gait [Skin Color & Pigmentation] : normal skin color and pigmentation [Skin Turgor] : normal skin turgor [] : no rash

## 2020-11-10 NOTE — REVIEW OF SYSTEMS
[Feeling Tired] : feeling tired [Numbness] : numbness [Tingling] : tingling [Migraine Headache] : migraine headaches [Tension Headache] : tension-type headaches [Difficulty Walking] : difficulty walking [Sleep Disturbances] : sleep disturbances [Eyesight Problems] : eyesight problems [Arthralgias] : arthralgias [Joint Pain] : joint pain [Joint Stiffness] : joint stiffness [Negative] : Heme/Lymph

## 2020-11-10 NOTE — DISCUSSION/SUMMARY
[FreeTextEntry1] : Patient with a history of chronic migraines with recent exacerbation of headaches mixed with tension headaches with cervicogenic pain.\par Rule out underlying structural pathology..\par Recommend patient have MRI of the brain with and without contrast.\par Lab work to rule out vasculitic pathology.\par Recommend patient to try with physical therapy for myofascial release.\par Keep a headache log.\par Return for followup evaluation in 3-4 weeks.\par Continue B12 supplements are low normal B12 levels.\par Discuss with the patient and patient education provided.\par Followup with you further medical problems.

## 2020-11-10 NOTE — REASON FOR VISIT
[Consultation] : a consultation visit [FreeTextEntry1] : Evaluation for Pt with New onset Headaches for last 2 months  with Cervical pain

## 2020-11-10 NOTE — HISTORY OF PRESENT ILLNESS
[FreeTextEntry1] : She is 65-year-old patient with a history of rectal CA, chronic cervical pain, chronic low back pain, neuropathy, osteoarthritis and DJD involving both knees, seen in the past for back problems in 2016 coming here for evaluation of recent onset of headaches for the last few weeks not responding to conventional over-the-counter medications. Seen by you and treated with steroids with some response and restarted again also headaches starts in the back of the neck radiates upwards into the head without any focal symptoms.\par Most the headaches are bilateral pressure like headaches sometimes throbbing pulsatile headaches. No visual disturbance or focal weakness paresthesias numbness. History of chronic migraines in the past.\par History of sleep apnea for which she uses CPAP treatments. History of neuropathy from chemotherapy. Denies of any radicular symptoms involving neck.

## 2020-11-11 ENCOUNTER — NON-APPOINTMENT (OUTPATIENT)
Age: 65
End: 2020-11-11

## 2020-11-11 DIAGNOSIS — Z12.31 ENCOUNTER FOR SCREENING MAMMOGRAM FOR MALIGNANT NEOPLASM OF BREAST: ICD-10-CM

## 2020-11-16 ENCOUNTER — APPOINTMENT (OUTPATIENT)
Dept: ORTHOPEDIC SURGERY | Facility: CLINIC | Age: 65
End: 2020-11-16
Payer: MEDICAID

## 2020-11-16 PROCEDURE — 20600 DRAIN/INJ JOINT/BURSA W/O US: CPT | Mod: FA

## 2020-11-16 PROCEDURE — 99213 OFFICE O/P EST LOW 20 MIN: CPT | Mod: 25

## 2020-11-16 NOTE — PROCEDURE
[FreeTextEntry1] : Injection: Thumb Left\par \par There was a discussion with the patient regarding this procedure and all questions/concerns were answered.  All of the risks, benefits and alternatives were discussed at length.  These include but are not limited to bleeding, infection, and allergic reaction.  Alcohol was used to clean, sterilize and prep the skin at the injection site on the dorsal aspect of the wrist.  A 27G needle was used to inject 0.6cc of 1% lidocaine to the injection site.  A 25G needle was used to inject 1cc of 4mg/mL dexamethasone into the wrist under fluoroscopic guidance.  A sterile bandage was applied to the site of the injection.  The patient tolerated the procedure well and there were no complications.\par

## 2020-11-16 NOTE — HISTORY OF PRESENT ILLNESS
[FreeTextEntry1] : 2/10/20: 64-year-old female presents for evaluation of pain at the base of the left thumb as well as a right middle trigger finger. Her symptoms have been present for several months she denies specific trauma.  Between the base of the thumb is gone aching in nature worse with activity and improved with rest. The pain radiates circumferentially above, as well as to the wrist. She also complains of dull pain at the A1 pulley of the right middle finger with locking of the PIP joint on flexion and extension.\par \par 09/16/2020: The patient returns to the office today for a repeat evaluation of her right middle trigger finger and her left thumb arthritis. Her right middle trigger finger symptoms did not improve much after her last cortisone injection. She continues to have moderate dull and achy pain to the left thumb base. She presents today for further treatment.\par \par 11/16/2020: The patient returns to the office with continued left thumb pain from her first CMC arthritis. She also has continued triggering of the right middle finger which has not improved after a cortisone injection at her last visit. She is interested in a cortisone injection of the left thumb and also interested in a surgical release of the right middle finger trigger finger.

## 2020-11-16 NOTE — ASSESSMENT
[FreeTextEntry1] : The patient in nature finger of the right middle finger and CMC arthritis of the left thumb. Today a cortisone injection was administered to the left thumb under fluoroscopic guidance and the patient tolerated the procedure well. She will be booked for surgical release of the right middle finger sometime in January. She will continue activity as tolerated until that time. The patient is in agreement with this plan.

## 2020-11-16 NOTE — PHYSICAL EXAM
[Normal Finger/nose] : finger to nose coordination [Normal] : no peripheral adenopathy appreciated [de-identified] : There is tenderness over the left basal joint with a positive grind test, no shoulder deformity. There is a positive crank test. There is swelling appreciated over the affected CMC joint which is not present on the opposite side. There is no evidence of infection or lymphadenopathy bilaterally to the level of the elbows There is no evidence of MCP hyperextension bilaterally. There is a negative Finkelstein's test There is no tenderness over the A-1 pulleys bilaterally. 5/5 stregnth bilaterally. \par \par The wrists have a symmetric and full range of motion bilaterally with no pain upon forced flexion, extension, pronation and supination. There is no tenderness over the scaphoid scapholunate region ligaments and no tenderness of the TFCC bilaterally. There is no tenderness of the pisotriquetral hamate hook. The second through fifth CMC his is stable and nontender bilaterally.\par There is a negative carpal tunnel compression test or Tinel's bilaterally.  \par \par Right middle finger:\par Skin intact no swelling no erythema no ecchymosis\par tenderness to palpation over the A1 pulley with active triggering on flexion and extension\par No other digits affected\par Full range of motion of the digits without pain\par sensation intact distally, capillary refill less than 2 seconds [de-identified] : tierar

## 2020-11-25 ENCOUNTER — OUTPATIENT (OUTPATIENT)
Dept: OUTPATIENT SERVICES | Facility: HOSPITAL | Age: 65
LOS: 1 days | End: 2020-11-25
Payer: MEDICAID

## 2020-11-25 ENCOUNTER — RESULT REVIEW (OUTPATIENT)
Age: 65
End: 2020-11-25

## 2020-11-25 ENCOUNTER — APPOINTMENT (OUTPATIENT)
Dept: MRI IMAGING | Facility: CLINIC | Age: 65
End: 2020-11-25
Payer: MEDICAID

## 2020-11-25 DIAGNOSIS — Z98.891 HISTORY OF UTERINE SCAR FROM PREVIOUS SURGERY: Chronic | ICD-10-CM

## 2020-11-25 DIAGNOSIS — Z98.84 BARIATRIC SURGERY STATUS: Chronic | ICD-10-CM

## 2020-11-25 DIAGNOSIS — Z90.49 ACQUIRED ABSENCE OF OTHER SPECIFIED PARTS OF DIGESTIVE TRACT: Chronic | ICD-10-CM

## 2020-11-25 DIAGNOSIS — Z98.890 OTHER SPECIFIED POSTPROCEDURAL STATES: Chronic | ICD-10-CM

## 2020-11-25 DIAGNOSIS — G44.229 CHRONIC TENSION-TYPE HEADACHE, NOT INTRACTABLE: ICD-10-CM

## 2020-11-25 DIAGNOSIS — Z87.19 PERSONAL HISTORY OF OTHER DISEASES OF THE DIGESTIVE SYSTEM: Chronic | ICD-10-CM

## 2020-11-25 DIAGNOSIS — Z87.39 PERSONAL HISTORY OF OTHER DISEASES OF THE MUSCULOSKELETAL SYSTEM AND CONNECTIVE TISSUE: Chronic | ICD-10-CM

## 2020-11-25 DIAGNOSIS — Z96.652 PRESENCE OF LEFT ARTIFICIAL KNEE JOINT: Chronic | ICD-10-CM

## 2020-11-25 PROCEDURE — A9585: CPT

## 2020-11-25 PROCEDURE — 70553 MRI BRAIN STEM W/O & W/DYE: CPT

## 2020-11-25 PROCEDURE — 70553 MRI BRAIN STEM W/O & W/DYE: CPT | Mod: 26

## 2020-12-14 ENCOUNTER — APPOINTMENT (OUTPATIENT)
Dept: NEUROLOGY | Facility: CLINIC | Age: 65
End: 2020-12-14
Payer: MEDICAID

## 2020-12-14 VITALS
WEIGHT: 224 LBS | DIASTOLIC BLOOD PRESSURE: 82 MMHG | SYSTOLIC BLOOD PRESSURE: 128 MMHG | HEIGHT: 63 IN | BODY MASS INDEX: 39.69 KG/M2 | HEART RATE: 78 BPM | TEMPERATURE: 97.8 F

## 2020-12-14 DIAGNOSIS — M54.12 RADICULOPATHY, CERVICAL REGION: ICD-10-CM

## 2020-12-14 DIAGNOSIS — R51.9 HEADACHE, UNSPECIFIED: ICD-10-CM

## 2020-12-14 PROCEDURE — 99214 OFFICE O/P EST MOD 30 MIN: CPT

## 2020-12-14 PROCEDURE — 99072 ADDL SUPL MATRL&STAF TM PHE: CPT

## 2020-12-14 RX ORDER — KETOCONAZOLE 20 MG/G
2 CREAM TOPICAL
Qty: 60 | Refills: 0 | Status: ACTIVE | COMMUNITY
Start: 2020-12-07

## 2020-12-14 NOTE — HISTORY OF PRESENT ILLNESS
[FreeTextEntry1] : She is 65-year-old patient coming here for followup evaluation for recent onset headaches with cervical pain. Headaches improved since last visit. MRI scan did reveal incidental small meningioma and developmental venous anomaly no acute pathology and reported.\par Patient informed of the results. No other new complaints. Unable to go to physical therapy due to insurance reasons.\par Overall headaches improved since last visit.

## 2020-12-14 NOTE — REASON FOR VISIT
[Follow-Up: _____] : a [unfilled] follow-up visit [FreeTextEntry1] : Follow up for Headaches and MRI results

## 2020-12-14 NOTE — DISCUSSION/SUMMARY
[FreeTextEntry1] : Age and chronic migraine headaches with recent exacerbation with nonfocal examination with persistent cervical pain.\par MRI scan showed incidental meningioma and developed mental venous anomaly.\par No acute intracranial pathology.\par Recommend continue exercises on a side step he has tolerated.\par At this time does not want any daily medications.\par Continue follow up evaluation as needed.\par Recommend keeping a headache log.\par Continue tizanidine 2 mg p.r.n. as needed\par Followup with you for other medical problems.\par Return for reevaluation in 6 months or as needed.\par Patient education provided regarding headaches and triggers.

## 2020-12-18 ENCOUNTER — APPOINTMENT (OUTPATIENT)
Dept: ORTHOPEDIC SURGERY | Facility: CLINIC | Age: 65
End: 2020-12-18

## 2020-12-21 PROBLEM — Z01.419 ENCOUNTER FOR ANNUAL ROUTINE GYNECOLOGICAL EXAMINATION: Status: RESOLVED | Noted: 2018-04-03 | Resolved: 2020-12-21

## 2021-01-06 ENCOUNTER — RESULT REVIEW (OUTPATIENT)
Age: 66
End: 2021-01-06

## 2021-01-06 ENCOUNTER — APPOINTMENT (OUTPATIENT)
Dept: ULTRASOUND IMAGING | Facility: CLINIC | Age: 66
End: 2021-01-06
Payer: MEDICAID

## 2021-01-06 ENCOUNTER — OUTPATIENT (OUTPATIENT)
Dept: OUTPATIENT SERVICES | Facility: HOSPITAL | Age: 66
LOS: 1 days | End: 2021-01-06
Payer: MEDICAID

## 2021-01-06 ENCOUNTER — APPOINTMENT (OUTPATIENT)
Dept: MAMMOGRAPHY | Facility: CLINIC | Age: 66
End: 2021-01-06
Payer: MEDICAID

## 2021-01-06 DIAGNOSIS — Z00.8 ENCOUNTER FOR OTHER GENERAL EXAMINATION: ICD-10-CM

## 2021-01-06 DIAGNOSIS — Z87.39 PERSONAL HISTORY OF OTHER DISEASES OF THE MUSCULOSKELETAL SYSTEM AND CONNECTIVE TISSUE: Chronic | ICD-10-CM

## 2021-01-06 DIAGNOSIS — Z98.890 OTHER SPECIFIED POSTPROCEDURAL STATES: Chronic | ICD-10-CM

## 2021-01-06 DIAGNOSIS — Z98.84 BARIATRIC SURGERY STATUS: Chronic | ICD-10-CM

## 2021-01-06 DIAGNOSIS — Z12.31 ENCOUNTER FOR SCREENING MAMMOGRAM FOR MALIGNANT NEOPLASM OF BREAST: ICD-10-CM

## 2021-01-06 DIAGNOSIS — Z90.49 ACQUIRED ABSENCE OF OTHER SPECIFIED PARTS OF DIGESTIVE TRACT: Chronic | ICD-10-CM

## 2021-01-06 DIAGNOSIS — Z87.19 PERSONAL HISTORY OF OTHER DISEASES OF THE DIGESTIVE SYSTEM: Chronic | ICD-10-CM

## 2021-01-06 DIAGNOSIS — Z98.891 HISTORY OF UTERINE SCAR FROM PREVIOUS SURGERY: Chronic | ICD-10-CM

## 2021-01-06 DIAGNOSIS — Z96.652 PRESENCE OF LEFT ARTIFICIAL KNEE JOINT: Chronic | ICD-10-CM

## 2021-01-06 PROCEDURE — 77063 BREAST TOMOSYNTHESIS BI: CPT | Mod: 26

## 2021-01-06 PROCEDURE — 77067 SCR MAMMO BI INCL CAD: CPT

## 2021-01-06 PROCEDURE — 77067 SCR MAMMO BI INCL CAD: CPT | Mod: 26

## 2021-01-06 PROCEDURE — 77063 BREAST TOMOSYNTHESIS BI: CPT

## 2021-01-06 PROCEDURE — 76641 ULTRASOUND BREAST COMPLETE: CPT | Mod: 26,50

## 2021-01-06 PROCEDURE — 76641 ULTRASOUND BREAST COMPLETE: CPT

## 2021-01-14 DIAGNOSIS — G89.18 OTHER ACUTE POSTPROCEDURAL PAIN: ICD-10-CM

## 2021-01-15 ENCOUNTER — APPOINTMENT (OUTPATIENT)
Dept: ORTHOPEDIC SURGERY | Facility: AMBULATORY MEDICAL SERVICES | Age: 66
End: 2021-01-15
Payer: MEDICAID

## 2021-01-15 PROCEDURE — 20605 DRAIN/INJ JOINT/BURSA W/O US: CPT | Mod: LT

## 2021-01-15 PROCEDURE — 26055 INCISE FINGER TENDON SHEATH: CPT | Mod: F7

## 2021-01-22 ENCOUNTER — APPOINTMENT (OUTPATIENT)
Dept: ORTHOPEDIC SURGERY | Facility: CLINIC | Age: 66
End: 2021-01-22
Payer: MEDICAID

## 2021-01-22 VITALS
WEIGHT: 224 LBS | HEIGHT: 63 IN | SYSTOLIC BLOOD PRESSURE: 129 MMHG | DIASTOLIC BLOOD PRESSURE: 83 MMHG | BODY MASS INDEX: 39.69 KG/M2 | HEART RATE: 78 BPM

## 2021-01-22 DIAGNOSIS — M18.12 UNILATERAL PRIMARY OSTEOARTHRITIS OF FIRST CARPOMETACARPAL JOINT, LEFT HAND: ICD-10-CM

## 2021-01-22 PROCEDURE — 99024 POSTOP FOLLOW-UP VISIT: CPT

## 2021-01-22 NOTE — HISTORY OF PRESENT ILLNESS
[Clean/Dry/Intact] : clean, dry and intact [Neuro Intact] : an unremarkable neurological exam [Vascular Intact] : ~T peripheral vascular exam normal [Doing Well] : is doing well [Excellent Pain Control] : has excellent pain control [No Sign of Infection] : is showing no signs of infection [Erythema] : not erythematous [Discharge] : absent of discharge [Dehiscence] : not dehisced [de-identified] : Procedure: Right middle trigger finger release\par DOS: 1/15/21 [de-identified] : Patient returns s/p right middle trigger finger release one week post operative, doing well.  she continues to have some pain at the base of herleft thumb despite her recent cortisone injection. The triggering of her right middle finger has resolvedand she is pleased with her progress. [de-identified] : right hand: \par no triggering of the right middle finger, finger range of motion nearly fully intact without pain [de-identified] : no imaging [de-identified] : Patient is one week s/p right middle trigger finger release, doing well. She has been recommended for slow return to activities as tolerated over the next couple weeks, and will return PRN\par

## 2021-01-26 ENCOUNTER — APPOINTMENT (OUTPATIENT)
Dept: OBGYN | Facility: CLINIC | Age: 66
End: 2021-01-26
Payer: MEDICAID

## 2021-01-26 VITALS
DIASTOLIC BLOOD PRESSURE: 76 MMHG | BODY MASS INDEX: 40.75 KG/M2 | HEIGHT: 63 IN | SYSTOLIC BLOOD PRESSURE: 118 MMHG | WEIGHT: 230 LBS

## 2021-01-26 DIAGNOSIS — Z01.419 ENCOUNTER FOR GYNECOLOGICAL EXAMINATION (GENERAL) (ROUTINE) W/OUT ABNORMAL FINDINGS: ICD-10-CM

## 2021-01-26 PROCEDURE — 99072 ADDL SUPL MATRL&STAF TM PHE: CPT

## 2021-01-26 PROCEDURE — 99396 PREV VISIT EST AGE 40-64: CPT

## 2021-01-27 PROBLEM — Z01.419 ENCOUNTER FOR ANNUAL ROUTINE GYNECOLOGICAL EXAMINATION: Status: RESOLVED | Noted: 2021-01-27 | Resolved: 2021-02-10

## 2021-01-27 NOTE — PHYSICAL EXAM
[Awake] : awake [Acute Distress] : no acute distress [Mass] : no breast mass [Nipple Discharge] : no nipple discharge [Axillary LAD] : no axillary lymphadenopathy [Tender] : non tender [No Bleeding] : there was no active vaginal bleeding [Pap Obtained] : a Pap smear was performed [Appropriately responsive] : appropriately responsive [Alert] : alert [No Acute Distress] : no acute distress [No Lymphadenopathy] : no lymphadenopathy [Soft] : soft [Non-tender] : non-tender [Non-distended] : non-distended [No HSM] : No HSM [No Lesions] : no lesions [No Mass] : no mass [Oriented x3] : oriented x3 [Examination Of The Breasts] : a normal appearance [No Masses] : no breast masses were palpable [Labia Majora] : normal [Labia Minora] : normal [Cystocele] : a cystocele [Normal] : normal [Uterine Adnexae] : normal [FreeTextEntry6] : limited exam due to body habitus

## 2021-01-29 ENCOUNTER — APPOINTMENT (OUTPATIENT)
Dept: ORTHOPEDIC SURGERY | Facility: CLINIC | Age: 66
End: 2021-01-29
Payer: MEDICAID

## 2021-01-29 VITALS — TEMPERATURE: 96.2 F

## 2021-01-29 VITALS
WEIGHT: 230 LBS | HEART RATE: 77 BPM | HEIGHT: 63 IN | SYSTOLIC BLOOD PRESSURE: 142 MMHG | BODY MASS INDEX: 40.75 KG/M2 | DIASTOLIC BLOOD PRESSURE: 93 MMHG

## 2021-01-29 PROCEDURE — 99214 OFFICE O/P EST MOD 30 MIN: CPT

## 2021-01-29 PROCEDURE — 73562 X-RAY EXAM OF KNEE 3: CPT | Mod: LT

## 2021-01-29 PROCEDURE — 99072 ADDL SUPL MATRL&STAF TM PHE: CPT

## 2021-02-07 ENCOUNTER — APPOINTMENT (OUTPATIENT)
Dept: MRI IMAGING | Facility: CLINIC | Age: 66
End: 2021-02-07
Payer: MEDICARE

## 2021-02-07 ENCOUNTER — OUTPATIENT (OUTPATIENT)
Dept: OUTPATIENT SERVICES | Facility: HOSPITAL | Age: 66
LOS: 1 days | End: 2021-02-07
Payer: MEDICARE

## 2021-02-07 DIAGNOSIS — Z87.19 PERSONAL HISTORY OF OTHER DISEASES OF THE DIGESTIVE SYSTEM: Chronic | ICD-10-CM

## 2021-02-07 DIAGNOSIS — Z98.891 HISTORY OF UTERINE SCAR FROM PREVIOUS SURGERY: Chronic | ICD-10-CM

## 2021-02-07 DIAGNOSIS — Z87.39 PERSONAL HISTORY OF OTHER DISEASES OF THE MUSCULOSKELETAL SYSTEM AND CONNECTIVE TISSUE: Chronic | ICD-10-CM

## 2021-02-07 DIAGNOSIS — Z98.890 OTHER SPECIFIED POSTPROCEDURAL STATES: Chronic | ICD-10-CM

## 2021-02-07 DIAGNOSIS — Z96.652 PRESENCE OF LEFT ARTIFICIAL KNEE JOINT: ICD-10-CM

## 2021-02-07 DIAGNOSIS — Z90.49 ACQUIRED ABSENCE OF OTHER SPECIFIED PARTS OF DIGESTIVE TRACT: Chronic | ICD-10-CM

## 2021-02-07 DIAGNOSIS — Z96.652 PRESENCE OF LEFT ARTIFICIAL KNEE JOINT: Chronic | ICD-10-CM

## 2021-02-07 DIAGNOSIS — Z98.84 BARIATRIC SURGERY STATUS: Chronic | ICD-10-CM

## 2021-02-07 PROCEDURE — 73721 MRI JNT OF LWR EXTRE W/O DYE: CPT

## 2021-02-07 PROCEDURE — 73721 MRI JNT OF LWR EXTRE W/O DYE: CPT | Mod: 26,LT,MH

## 2021-02-22 ENCOUNTER — APPOINTMENT (OUTPATIENT)
Dept: INTERNAL MEDICINE | Facility: CLINIC | Age: 66
End: 2021-02-22

## 2021-02-23 ENCOUNTER — APPOINTMENT (OUTPATIENT)
Dept: ORTHOPEDIC SURGERY | Facility: CLINIC | Age: 66
End: 2021-02-23
Payer: MEDICARE

## 2021-02-23 VITALS
BODY MASS INDEX: 40.75 KG/M2 | HEART RATE: 76 BPM | SYSTOLIC BLOOD PRESSURE: 149 MMHG | HEIGHT: 63 IN | WEIGHT: 230 LBS | DIASTOLIC BLOOD PRESSURE: 94 MMHG

## 2021-02-23 DIAGNOSIS — M65.331 TRIGGER FINGER, RIGHT MIDDLE FINGER: ICD-10-CM

## 2021-02-23 PROCEDURE — 99024 POSTOP FOLLOW-UP VISIT: CPT

## 2021-02-23 NOTE — HISTORY OF PRESENT ILLNESS
[Clean/Dry/Intact] : clean, dry and intact [Neuro Intact] : an unremarkable neurological exam [Vascular Intact] : ~T peripheral vascular exam normal [Doing Well] : is doing well [Excellent Pain Control] : has excellent pain control [No Sign of Infection] : is showing no signs of infection [Healed] : healed [Erythema] : not erythematous [Discharge] : absent of discharge [Dehiscence] : not dehisced [de-identified] : Procedure: Right middle trigger finger release\par DOS: 1/15/21 [de-identified] : Patient returns s/p right middle trigger finger release five weeks post operative. She reports continued swelling and pain to the right middle finger, with radiation to the PIP and DIP joints, mostly with gripping activities.  She also notes continued stiffness as well. She returns today to make sure this is normal.  [de-identified] : right hand: \par no triggering of the right middle finger, finger range of motion nearly fully intact with mild pain.  [de-identified] : no imaging [de-identified] : Patient is five weeks s/p right middle trigger finger release, doing well. she has been reassured her symptoms are within normal limits. She has been recommended for scar massage for the soft tissue discomfort, along with range of motion exercises to improve ROM and strength. She has been recommended to begin a course of hand therapy, but the patient would like to hold off at this time. She will follow up as needed.

## 2021-02-26 ENCOUNTER — APPOINTMENT (OUTPATIENT)
Dept: ORTHOPEDIC SURGERY | Facility: CLINIC | Age: 66
End: 2021-02-26
Payer: MEDICARE

## 2021-02-26 VITALS
HEART RATE: 83 BPM | HEIGHT: 63 IN | DIASTOLIC BLOOD PRESSURE: 80 MMHG | WEIGHT: 230 LBS | SYSTOLIC BLOOD PRESSURE: 126 MMHG | BODY MASS INDEX: 40.75 KG/M2 | TEMPERATURE: 98.1 F

## 2021-02-26 PROCEDURE — 99215 OFFICE O/P EST HI 40 MIN: CPT

## 2021-03-10 ENCOUNTER — APPOINTMENT (OUTPATIENT)
Dept: NUCLEAR MEDICINE | Facility: CLINIC | Age: 66
End: 2021-03-10
Payer: MEDICARE

## 2021-03-10 ENCOUNTER — OUTPATIENT (OUTPATIENT)
Dept: OUTPATIENT SERVICES | Facility: HOSPITAL | Age: 66
LOS: 1 days | End: 2021-03-10

## 2021-03-10 ENCOUNTER — RESULT REVIEW (OUTPATIENT)
Age: 66
End: 2021-03-10

## 2021-03-10 DIAGNOSIS — Z98.84 BARIATRIC SURGERY STATUS: Chronic | ICD-10-CM

## 2021-03-10 DIAGNOSIS — Z90.49 ACQUIRED ABSENCE OF OTHER SPECIFIED PARTS OF DIGESTIVE TRACT: Chronic | ICD-10-CM

## 2021-03-10 DIAGNOSIS — Z98.890 OTHER SPECIFIED POSTPROCEDURAL STATES: Chronic | ICD-10-CM

## 2021-03-10 DIAGNOSIS — Z87.19 PERSONAL HISTORY OF OTHER DISEASES OF THE DIGESTIVE SYSTEM: Chronic | ICD-10-CM

## 2021-03-10 DIAGNOSIS — Z87.39 PERSONAL HISTORY OF OTHER DISEASES OF THE MUSCULOSKELETAL SYSTEM AND CONNECTIVE TISSUE: Chronic | ICD-10-CM

## 2021-03-10 DIAGNOSIS — Z96.652 PRESENCE OF LEFT ARTIFICIAL KNEE JOINT: ICD-10-CM

## 2021-03-10 DIAGNOSIS — Z98.891 HISTORY OF UTERINE SCAR FROM PREVIOUS SURGERY: Chronic | ICD-10-CM

## 2021-03-10 DIAGNOSIS — Z96.652 PRESENCE OF LEFT ARTIFICIAL KNEE JOINT: Chronic | ICD-10-CM

## 2021-03-10 PROCEDURE — 78830 RP LOCLZJ TUM SPECT W/CT 1: CPT | Mod: 26

## 2021-03-10 PROCEDURE — 78315 BONE IMAGING 3 PHASE: CPT | Mod: 26

## 2021-03-13 ENCOUNTER — TRANSCRIPTION ENCOUNTER (OUTPATIENT)
Age: 66
End: 2021-03-13

## 2021-03-26 ENCOUNTER — APPOINTMENT (OUTPATIENT)
Dept: ORTHOPEDIC SURGERY | Facility: CLINIC | Age: 66
End: 2021-03-26
Payer: MEDICARE

## 2021-03-26 VITALS
SYSTOLIC BLOOD PRESSURE: 140 MMHG | HEART RATE: 81 BPM | DIASTOLIC BLOOD PRESSURE: 87 MMHG | TEMPERATURE: 97.6 F | HEIGHT: 63 IN | BODY MASS INDEX: 40.75 KG/M2 | WEIGHT: 230 LBS

## 2021-03-26 LAB
ANION GAP SERPL CALC-SCNC: 11 MMOL/L
BASOPHILS # BLD AUTO: 0.03 K/UL
BASOPHILS NFR BLD AUTO: 0.6 %
BUN SERPL-MCNC: 13 MG/DL
CALCIUM SERPL-MCNC: 10.1 MG/DL
CHLORIDE SERPL-SCNC: 107 MMOL/L
CO2 SERPL-SCNC: 26 MMOL/L
CREAT SERPL-MCNC: 0.71 MG/DL
CRP SERPL-MCNC: 10 MG/L
EOSINOPHIL # BLD AUTO: 0.15 K/UL
EOSINOPHIL NFR BLD AUTO: 3 %
ERYTHROCYTE [SEDIMENTATION RATE] IN BLOOD BY WESTERGREN METHOD: 8 MM/HR
GLUCOSE SERPL-MCNC: 99 MG/DL
HCT VFR BLD CALC: 38.3 %
HGB BLD-MCNC: 12.6 G/DL
IMM GRANULOCYTES NFR BLD AUTO: 0.4 %
LYMPHOCYTES # BLD AUTO: 1.26 K/UL
LYMPHOCYTES NFR BLD AUTO: 25.4 %
MAN DIFF?: NORMAL
MCHC RBC-ENTMCNC: 28.3 PG
MCHC RBC-ENTMCNC: 32.9 GM/DL
MCV RBC AUTO: 85.9 FL
MONOCYTES # BLD AUTO: 0.35 K/UL
MONOCYTES NFR BLD AUTO: 7.1 %
NEUTROPHILS # BLD AUTO: 3.15 K/UL
NEUTROPHILS NFR BLD AUTO: 63.5 %
PLATELET # BLD AUTO: 194 K/UL
POTASSIUM SERPL-SCNC: 4.1 MMOL/L
RBC # BLD: 4.46 M/UL
RBC # FLD: 13.3 %
SODIUM SERPL-SCNC: 145 MMOL/L
WBC # FLD AUTO: 4.96 K/UL

## 2021-03-26 PROCEDURE — 99215 OFFICE O/P EST HI 40 MIN: CPT

## 2021-04-12 ENCOUNTER — APPOINTMENT (OUTPATIENT)
Dept: ORTHOPEDIC SURGERY | Facility: CLINIC | Age: 66
End: 2021-04-12
Payer: MEDICARE

## 2021-04-12 VITALS
WEIGHT: 230 LBS | SYSTOLIC BLOOD PRESSURE: 119 MMHG | HEART RATE: 77 BPM | TEMPERATURE: 97.5 F | DIASTOLIC BLOOD PRESSURE: 75 MMHG | HEIGHT: 63 IN | BODY MASS INDEX: 40.75 KG/M2

## 2021-04-12 DIAGNOSIS — M47.816 SPONDYLOSIS W/OUT MYELOPATHY OR RADICULOPATHY, LUMBAR REGION: ICD-10-CM

## 2021-04-12 PROCEDURE — 99214 OFFICE O/P EST MOD 30 MIN: CPT

## 2021-04-12 PROCEDURE — 72110 X-RAY EXAM L-2 SPINE 4/>VWS: CPT

## 2021-04-19 ENCOUNTER — OUTPATIENT (OUTPATIENT)
Dept: OUTPATIENT SERVICES | Facility: HOSPITAL | Age: 66
LOS: 1 days | End: 2021-04-19
Payer: MEDICARE

## 2021-04-19 ENCOUNTER — APPOINTMENT (OUTPATIENT)
Dept: MRI IMAGING | Facility: CLINIC | Age: 66
End: 2021-04-19
Payer: MEDICARE

## 2021-04-19 DIAGNOSIS — Z98.891 HISTORY OF UTERINE SCAR FROM PREVIOUS SURGERY: Chronic | ICD-10-CM

## 2021-04-19 DIAGNOSIS — Z98.890 OTHER SPECIFIED POSTPROCEDURAL STATES: Chronic | ICD-10-CM

## 2021-04-19 DIAGNOSIS — Z90.49 ACQUIRED ABSENCE OF OTHER SPECIFIED PARTS OF DIGESTIVE TRACT: Chronic | ICD-10-CM

## 2021-04-19 DIAGNOSIS — M47.816 SPONDYLOSIS WITHOUT MYELOPATHY OR RADICULOPATHY, LUMBAR REGION: ICD-10-CM

## 2021-04-19 DIAGNOSIS — Z87.39 PERSONAL HISTORY OF OTHER DISEASES OF THE MUSCULOSKELETAL SYSTEM AND CONNECTIVE TISSUE: Chronic | ICD-10-CM

## 2021-04-19 DIAGNOSIS — Z96.652 PRESENCE OF LEFT ARTIFICIAL KNEE JOINT: Chronic | ICD-10-CM

## 2021-04-19 DIAGNOSIS — Z87.19 PERSONAL HISTORY OF OTHER DISEASES OF THE DIGESTIVE SYSTEM: Chronic | ICD-10-CM

## 2021-04-19 DIAGNOSIS — Z98.84 BARIATRIC SURGERY STATUS: Chronic | ICD-10-CM

## 2021-04-19 PROCEDURE — G1004: CPT

## 2021-04-19 PROCEDURE — 72148 MRI LUMBAR SPINE W/O DYE: CPT

## 2021-04-19 PROCEDURE — 72148 MRI LUMBAR SPINE W/O DYE: CPT | Mod: 26,ME

## 2021-05-03 ENCOUNTER — APPOINTMENT (OUTPATIENT)
Dept: ORTHOPEDIC SURGERY | Facility: CLINIC | Age: 66
End: 2021-05-03

## 2021-05-10 DIAGNOSIS — E66.9 OBESITY, UNSPECIFIED: ICD-10-CM

## 2021-05-10 DIAGNOSIS — Z85.048 PERSONAL HISTORY OF OTHER MALIGNANT NEOPLASM OF RECTUM, RECTOSIGMOID JUNCTION, AND ANUS: ICD-10-CM

## 2021-05-10 DIAGNOSIS — K58.9 IRRITABLE BOWEL SYNDROME W/OUT DIARRHEA: ICD-10-CM

## 2021-06-14 ENCOUNTER — APPOINTMENT (OUTPATIENT)
Dept: NEUROLOGY | Facility: CLINIC | Age: 66
End: 2021-06-14

## 2021-08-17 NOTE — H&P ADULT - NSHPSOCIALHISTORY_GEN_ALL_CORE
Pt lives with her asthmatic daughter and son-in-law who have twin grandchildren aged 4.    Pt works as a .  She smoked for 10-15 pack years and quit >20 yrs ago.   She reports occas ETOH.  No drug use.
Name band;

## 2021-08-18 ENCOUNTER — OUTPATIENT (OUTPATIENT)
Dept: OUTPATIENT SERVICES | Facility: HOSPITAL | Age: 66
LOS: 1 days | End: 2021-08-18
Payer: MEDICARE

## 2021-08-18 ENCOUNTER — APPOINTMENT (OUTPATIENT)
Dept: CT IMAGING | Facility: CLINIC | Age: 66
End: 2021-08-18
Payer: MEDICARE

## 2021-08-18 DIAGNOSIS — Z98.891 HISTORY OF UTERINE SCAR FROM PREVIOUS SURGERY: Chronic | ICD-10-CM

## 2021-08-18 DIAGNOSIS — Z00.8 ENCOUNTER FOR OTHER GENERAL EXAMINATION: ICD-10-CM

## 2021-08-18 DIAGNOSIS — Z87.19 PERSONAL HISTORY OF OTHER DISEASES OF THE DIGESTIVE SYSTEM: Chronic | ICD-10-CM

## 2021-08-18 DIAGNOSIS — Z90.49 ACQUIRED ABSENCE OF OTHER SPECIFIED PARTS OF DIGESTIVE TRACT: Chronic | ICD-10-CM

## 2021-08-18 DIAGNOSIS — Z98.890 OTHER SPECIFIED POSTPROCEDURAL STATES: Chronic | ICD-10-CM

## 2021-08-18 DIAGNOSIS — Z96.652 PRESENCE OF LEFT ARTIFICIAL KNEE JOINT: Chronic | ICD-10-CM

## 2021-08-18 DIAGNOSIS — Z87.39 PERSONAL HISTORY OF OTHER DISEASES OF THE MUSCULOSKELETAL SYSTEM AND CONNECTIVE TISSUE: Chronic | ICD-10-CM

## 2021-08-18 DIAGNOSIS — Z98.84 BARIATRIC SURGERY STATUS: Chronic | ICD-10-CM

## 2021-08-18 PROCEDURE — 82565 ASSAY OF CREATININE: CPT

## 2021-08-18 PROCEDURE — 74177 CT ABD & PELVIS W/CONTRAST: CPT | Mod: 26,MH

## 2021-08-18 PROCEDURE — 74177 CT ABD & PELVIS W/CONTRAST: CPT | Mod: MH

## 2021-10-12 ENCOUNTER — APPOINTMENT (OUTPATIENT)
Dept: DISASTER EMERGENCY | Facility: CLINIC | Age: 66
End: 2021-10-12

## 2021-10-12 DIAGNOSIS — Z01.818 ENCOUNTER FOR OTHER PREPROCEDURAL EXAMINATION: ICD-10-CM

## 2021-10-29 ENCOUNTER — OUTPATIENT (OUTPATIENT)
Dept: OUTPATIENT SERVICES | Facility: HOSPITAL | Age: 66
LOS: 1 days | Discharge: HOME | End: 2021-10-29

## 2021-10-29 DIAGNOSIS — E53.8 DEFICIENCY OF OTHER SPECIFIED B GROUP VITAMINS: ICD-10-CM

## 2021-10-29 DIAGNOSIS — Z90.49 ACQUIRED ABSENCE OF OTHER SPECIFIED PARTS OF DIGESTIVE TRACT: Chronic | ICD-10-CM

## 2021-10-29 DIAGNOSIS — Z87.19 PERSONAL HISTORY OF OTHER DISEASES OF THE DIGESTIVE SYSTEM: Chronic | ICD-10-CM

## 2021-10-29 DIAGNOSIS — Z96.652 PRESENCE OF LEFT ARTIFICIAL KNEE JOINT: Chronic | ICD-10-CM

## 2021-10-29 DIAGNOSIS — Z87.39 PERSONAL HISTORY OF OTHER DISEASES OF THE MUSCULOSKELETAL SYSTEM AND CONNECTIVE TISSUE: Chronic | ICD-10-CM

## 2021-10-29 DIAGNOSIS — Z98.891 HISTORY OF UTERINE SCAR FROM PREVIOUS SURGERY: Chronic | ICD-10-CM

## 2021-10-29 DIAGNOSIS — Z98.890 OTHER SPECIFIED POSTPROCEDURAL STATES: Chronic | ICD-10-CM

## 2021-10-29 DIAGNOSIS — Z98.84 BARIATRIC SURGERY STATUS: Chronic | ICD-10-CM

## 2021-11-01 ENCOUNTER — RESULT REVIEW (OUTPATIENT)
Age: 66
End: 2021-11-01

## 2021-11-01 ENCOUNTER — APPOINTMENT (OUTPATIENT)
Age: 66
End: 2021-11-01

## 2021-11-01 ENCOUNTER — APPOINTMENT (OUTPATIENT)
Dept: HEMATOLOGY ONCOLOGY | Facility: CLINIC | Age: 66
End: 2021-11-01
Payer: MEDICARE

## 2021-11-01 VITALS
DIASTOLIC BLOOD PRESSURE: 85 MMHG | SYSTOLIC BLOOD PRESSURE: 172 MMHG | WEIGHT: 223 LBS | TEMPERATURE: 98.3 F | HEART RATE: 78 BPM

## 2021-11-01 DIAGNOSIS — C21.0 MALIGNANT NEOPLASM OF ANUS, UNSPECIFIED: ICD-10-CM

## 2021-11-01 DIAGNOSIS — Z85.048 PERSONAL HISTORY OF OTHER MALIGNANT NEOPLASM OF RECTUM, RECTOSIGMOID JUNCTION, AND ANUS: ICD-10-CM

## 2021-11-01 LAB
ALBUMIN SERPL ELPH-MCNC: 4.6 G/DL — SIGNIFICANT CHANGE UP (ref 3.3–5)
ALP SERPL-CCNC: 104 U/L — SIGNIFICANT CHANGE UP (ref 40–120)
ALT FLD-CCNC: 12 U/L — SIGNIFICANT CHANGE UP (ref 10–45)
ANION GAP SERPL CALC-SCNC: 15 MMOL/L — SIGNIFICANT CHANGE UP (ref 5–17)
AST SERPL-CCNC: 12 U/L — SIGNIFICANT CHANGE UP (ref 10–40)
BASOPHILS # BLD AUTO: 0.06 K/UL — SIGNIFICANT CHANGE UP (ref 0–0.2)
BASOPHILS NFR BLD AUTO: 0.8 % — SIGNIFICANT CHANGE UP (ref 0–2)
BILIRUB SERPL-MCNC: 0.8 MG/DL — SIGNIFICANT CHANGE UP (ref 0.2–1.2)
BUN SERPL-MCNC: 15 MG/DL — SIGNIFICANT CHANGE UP (ref 7–23)
CALCIUM SERPL-MCNC: 9.8 MG/DL — SIGNIFICANT CHANGE UP (ref 8.4–10.5)
CHLORIDE SERPL-SCNC: 102 MMOL/L — SIGNIFICANT CHANGE UP (ref 96–108)
CO2 SERPL-SCNC: 24 MMOL/L — SIGNIFICANT CHANGE UP (ref 22–31)
CREAT SERPL-MCNC: 0.67 MG/DL — SIGNIFICANT CHANGE UP (ref 0.5–1.3)
EOSINOPHIL # BLD AUTO: 0.1 K/UL — SIGNIFICANT CHANGE UP (ref 0–0.5)
EOSINOPHIL NFR BLD AUTO: 1.4 % — SIGNIFICANT CHANGE UP (ref 0–6)
GLUCOSE SERPL-MCNC: 100 MG/DL — HIGH (ref 70–99)
HCT VFR BLD CALC: 41.5 % — SIGNIFICANT CHANGE UP (ref 34.5–45)
HGB BLD-MCNC: 13.7 G/DL — SIGNIFICANT CHANGE UP (ref 11.5–15.5)
IMM GRANULOCYTES NFR BLD AUTO: 0.7 % — SIGNIFICANT CHANGE UP (ref 0–1.5)
LYMPHOCYTES # BLD AUTO: 1.25 K/UL — SIGNIFICANT CHANGE UP (ref 1–3.3)
LYMPHOCYTES # BLD AUTO: 17.3 % — SIGNIFICANT CHANGE UP (ref 13–44)
MCHC RBC-ENTMCNC: 28.2 PG — SIGNIFICANT CHANGE UP (ref 27–34)
MCHC RBC-ENTMCNC: 33 GM/DL — SIGNIFICANT CHANGE UP (ref 32–36)
MCV RBC AUTO: 85.4 FL — SIGNIFICANT CHANGE UP (ref 80–100)
MONOCYTES # BLD AUTO: 0.52 K/UL — SIGNIFICANT CHANGE UP (ref 0–0.9)
MONOCYTES NFR BLD AUTO: 7.2 % — SIGNIFICANT CHANGE UP (ref 2–14)
NEUTROPHILS # BLD AUTO: 5.23 K/UL — SIGNIFICANT CHANGE UP (ref 1.8–7.4)
NEUTROPHILS NFR BLD AUTO: 72.6 % — SIGNIFICANT CHANGE UP (ref 43–77)
NRBC # BLD: 0 /100 WBCS — SIGNIFICANT CHANGE UP (ref 0–0)
PLATELET # BLD AUTO: 189 K/UL — SIGNIFICANT CHANGE UP (ref 150–400)
POTASSIUM SERPL-MCNC: 4.4 MMOL/L — SIGNIFICANT CHANGE UP (ref 3.5–5.3)
POTASSIUM SERPL-SCNC: 4.4 MMOL/L — SIGNIFICANT CHANGE UP (ref 3.5–5.3)
PROT SERPL-MCNC: 6.9 G/DL — SIGNIFICANT CHANGE UP (ref 6–8.3)
RBC # BLD: 4.86 M/UL — SIGNIFICANT CHANGE UP (ref 3.8–5.2)
RBC # FLD: 13 % — SIGNIFICANT CHANGE UP (ref 10.3–14.5)
SODIUM SERPL-SCNC: 141 MMOL/L — SIGNIFICANT CHANGE UP (ref 135–145)
WBC # BLD: 7.21 K/UL — SIGNIFICANT CHANGE UP (ref 3.8–10.5)
WBC # FLD AUTO: 7.21 K/UL — SIGNIFICANT CHANGE UP (ref 3.8–10.5)

## 2021-11-01 PROCEDURE — 99213 OFFICE O/P EST LOW 20 MIN: CPT

## 2021-11-01 NOTE — ASSESSMENT
[FreeTextEntry1] : 65 y/o female with hx of anal cancer stage II s/p concurrent chemoRT ( 5Fu mitomycin) completed May 2015) with complete remission.\par Monitored clinically with no evidence of recurrence.\par She follows regularly with colorectal surgery, GYN and goes to PCP at least once a year.\par \par At this points she can follow up with oncology only on PRN basis. Routine scans also not indicated.  \par \par \par

## 2021-11-01 NOTE — HISTORY OF PRESENT ILLNESS
[de-identified] : 66 F with stage II anal squamous cell carcinoma diagnosed 2015.\par \par CASE SYNOPSIS:\par 12/2014- to rectal pressure, bleeding, pain with defecation.\par 2/12/15- sigmoidoscopy: Mass in the proximal anal canal and distal rectum, occupying the anterior one third of the circumference.\par Biopsy: Squamous cell carcinoma.\par CT CAP: Enlarged perirectal lymph nodes; no distant metastases. \par 3/10/15- PET/CT: Few stop centimeter pelvic lymph nodes too small to characterize. \par Indeterminate hypermetabolic right inguinal lymph node.\par 3/16 - 5/2015: : concurrent chemotherapy/XRT with mitomycin C/5 FU.\par Complete remission. \par \par \par Monitored.  [de-identified] : Had bout of diverticulitis recently ( recurrent) Follows with colorectal surgery.\par Mild chronic incontinence since chemoRT.\par Goes for GYN exam yearly.\par

## 2021-11-01 NOTE — PHYSICAL EXAM
[Fully active, able to carry on all pre-disease performance without restriction] : Status 0 - Fully active, able to carry on all pre-disease performance without restriction [Obese] : obese [Normal] : normal spine exam without palpable tenderness, no kyphosis or scoliosis

## 2021-12-27 ENCOUNTER — TRANSCRIPTION ENCOUNTER (OUTPATIENT)
Age: 66
End: 2021-12-27

## 2022-01-03 ENCOUNTER — APPOINTMENT (OUTPATIENT)
Dept: NEUROLOGY | Facility: CLINIC | Age: 67
End: 2022-01-03
Payer: MEDICARE

## 2022-01-03 DIAGNOSIS — D32.9 BENIGN NEOPLASM OF MENINGES, UNSPECIFIED: ICD-10-CM

## 2022-01-03 DIAGNOSIS — G44.229 CHRONIC TENSION-TYPE HEADACHE, NOT INTRACTABLE: ICD-10-CM

## 2022-01-03 DIAGNOSIS — G43.809 OTHER MIGRAINE, NOT INTRACTABLE, W/OUT STATUS MIGRAINOSUS: ICD-10-CM

## 2022-01-03 PROCEDURE — 99215 OFFICE O/P EST HI 40 MIN: CPT | Mod: 95

## 2022-01-03 NOTE — DISCUSSION/SUMMARY
[FreeTextEntry1] : She is 66-year-old patient with acute exacerbation of chronic migraine headaches with a nonfocal examination.\par Seen today in tele health visit due to COVID emergency.\par Recommend patient to trial with Fioricet p.r.n. as needed for the headaches.\par Recommend Imitrex 50 mg p.r.n. as a rescue medication.\par MRI of the brain with and without contrast for followup MRI scan.\par Followup evaluation in 3-4 months or as needed.\par  Patient education provided and discussed with the patient.\par Followup with you for other medical problems.\par

## 2022-01-03 NOTE — REASON FOR VISIT
[Home] : at home, [unfilled] , at the time of the visit. [Verbal consent obtained from patient] : the patient, [unfilled] [Follow-Up: _____] : a [unfilled] follow-up visit [FreeTextEntry1] : Follow up fro Pt with Ch Migraines mixed with tension headaches and incidental Meningioma

## 2022-01-03 NOTE — PHYSICAL EXAM
[General Appearance - Alert] : alert [General Appearance - In No Acute Distress] : in no acute distress [Oriented To Time, Place, And Person] : oriented to person, place, and time [Impaired Insight] : insight and judgment were intact [Affect] : the affect was normal [Person] : oriented to person [Place] : oriented to place [Time] : oriented to time [Short Term Intact] : short term memory impaired [Concentration Intact] : normal concentrating ability [Visual Intact] : visual attention was ~T not ~L decreased [Naming Objects] : no difficulty naming common objects [Repeating Phrases] : no difficulty repeating a phrase [Writing A Sentence] : no difficulty writing a sentence [Fluency] : fluency intact [Comprehension] : comprehension intact [Reading] : reading intact [Past History] : adequate knowledge of personal past history [Cranial Nerves Optic (II)] : visual acuity intact bilaterally,  visual fields full to confrontation, pupils equal round and reactive to light [Cranial Nerves Oculomotor (III)] : extraocular motion intact [Cranial Nerves Trigeminal (V)] : facial sensation intact symmetrically [Cranial Nerves Facial (VII)] : face symmetrical [Cranial Nerves Vestibulocochlear (VIII)] : hearing was intact bilaterally [Cranial Nerves Glossopharyngeal (IX)] : tongue and palate midline [Cranial Nerves Accessory (XI - Cranial And Spinal)] : head turning and shoulder shrug symmetric [Cranial Nerves Hypoglossal (XII)] : there was no tongue deviation with protrusion [Motor Tone] : muscle tone was normal in all four extremities [Motor Strength] : muscle strength was normal in all four extremities [No Muscle Atrophy] : normal bulk in all four extremities [Sensation Tactile Decrease] : light touch was intact [Limited Balance] : the patient's balance was impaired [Past-pointing] : there was no past-pointing [Tremor] : no tremor present [1+] : Patella left 1+ [0] : Ankle jerk left 0 [Plantar Reflex Right Only] : normal on the right [Plantar Reflex Left Only] : normal on the left [FreeTextEntry6] : Rt hand  4/5  [FreeTextEntry8] : Antalgic gait

## 2022-01-03 NOTE — HISTORY OF PRESENT ILLNESS
[FreeTextEntry1] : Verbal consent obtained for tele health visit.\par She is 66-year-old patient with a history of chronic migraine headaches mixed with tension headaches with incidental left side meningioma with a developmental venous anomaly stable neurologically not seen since December 2020.\par Seen today on tele health visit for 2-3 month history of increasing headaches not responding to over-the-counter medications. Denies of any nausea vomiting or focal symptoms. Headaches improved significantly since it started 2 months ago. Not associated any focal symptoms. Prior history of similar headaches which responded with over-the-counter medications or Fioricet.\par Denies of any other symptoms with the headaches recently. Now she got appointment but  her headaches improved.

## 2022-01-03 NOTE — REVIEW OF SYSTEMS
[Feeling Tired] : not feeling tired [Numbness] : no numbness [Tingling] : no tingling [Migraine Headache] : migraine headaches [Tension Headache] : tension-type headaches [Difficulty Walking] : no difficulty walking [Sleep Disturbances] : no sleep disturbances [Eyesight Problems] : no eyesight problems [Arthralgias] : arthralgias [Joint Pain] : joint pain [Joint Stiffness] : joint stiffness [Negative] : Heme/Lymph

## 2022-01-18 DIAGNOSIS — Z12.39 ENCOUNTER FOR OTHER SCREENING FOR MALIGNANT NEOPLASM OF BREAST: ICD-10-CM

## 2022-01-19 ENCOUNTER — APPOINTMENT (OUTPATIENT)
Dept: MRI IMAGING | Facility: CLINIC | Age: 67
End: 2022-01-19
Payer: MEDICARE

## 2022-01-19 ENCOUNTER — OUTPATIENT (OUTPATIENT)
Dept: OUTPATIENT SERVICES | Facility: HOSPITAL | Age: 67
LOS: 1 days | End: 2022-01-19
Payer: MEDICARE

## 2022-01-19 DIAGNOSIS — Z98.890 OTHER SPECIFIED POSTPROCEDURAL STATES: Chronic | ICD-10-CM

## 2022-01-19 DIAGNOSIS — Z98.84 BARIATRIC SURGERY STATUS: Chronic | ICD-10-CM

## 2022-01-19 DIAGNOSIS — Z87.19 PERSONAL HISTORY OF OTHER DISEASES OF THE DIGESTIVE SYSTEM: Chronic | ICD-10-CM

## 2022-01-19 DIAGNOSIS — Z90.49 ACQUIRED ABSENCE OF OTHER SPECIFIED PARTS OF DIGESTIVE TRACT: Chronic | ICD-10-CM

## 2022-01-19 DIAGNOSIS — Z87.39 PERSONAL HISTORY OF OTHER DISEASES OF THE MUSCULOSKELETAL SYSTEM AND CONNECTIVE TISSUE: Chronic | ICD-10-CM

## 2022-01-19 DIAGNOSIS — R51.9 HEADACHE, UNSPECIFIED: ICD-10-CM

## 2022-01-19 DIAGNOSIS — Z98.891 HISTORY OF UTERINE SCAR FROM PREVIOUS SURGERY: Chronic | ICD-10-CM

## 2022-01-19 DIAGNOSIS — Z96.652 PRESENCE OF LEFT ARTIFICIAL KNEE JOINT: Chronic | ICD-10-CM

## 2022-01-19 PROCEDURE — G1004: CPT

## 2022-01-19 PROCEDURE — 70553 MRI BRAIN STEM W/O & W/DYE: CPT | Mod: ME

## 2022-01-19 PROCEDURE — A9585: CPT

## 2022-01-19 PROCEDURE — 70553 MRI BRAIN STEM W/O & W/DYE: CPT | Mod: 26,ME

## 2022-01-20 ENCOUNTER — NON-APPOINTMENT (OUTPATIENT)
Age: 67
End: 2022-01-20

## 2022-01-24 ENCOUNTER — APPOINTMENT (OUTPATIENT)
Dept: ULTRASOUND IMAGING | Facility: CLINIC | Age: 67
End: 2022-01-24
Payer: MEDICARE

## 2022-01-24 ENCOUNTER — APPOINTMENT (OUTPATIENT)
Dept: MAMMOGRAPHY | Facility: CLINIC | Age: 67
End: 2022-01-24
Payer: MEDICARE

## 2022-01-24 ENCOUNTER — OUTPATIENT (OUTPATIENT)
Dept: OUTPATIENT SERVICES | Facility: HOSPITAL | Age: 67
LOS: 1 days | End: 2022-01-24
Payer: MEDICARE

## 2022-01-24 ENCOUNTER — RESULT REVIEW (OUTPATIENT)
Age: 67
End: 2022-01-24

## 2022-01-24 DIAGNOSIS — Z98.891 HISTORY OF UTERINE SCAR FROM PREVIOUS SURGERY: Chronic | ICD-10-CM

## 2022-01-24 DIAGNOSIS — Z98.84 BARIATRIC SURGERY STATUS: Chronic | ICD-10-CM

## 2022-01-24 DIAGNOSIS — Z87.39 PERSONAL HISTORY OF OTHER DISEASES OF THE MUSCULOSKELETAL SYSTEM AND CONNECTIVE TISSUE: Chronic | ICD-10-CM

## 2022-01-24 DIAGNOSIS — Z98.890 OTHER SPECIFIED POSTPROCEDURAL STATES: Chronic | ICD-10-CM

## 2022-01-24 DIAGNOSIS — Z90.49 ACQUIRED ABSENCE OF OTHER SPECIFIED PARTS OF DIGESTIVE TRACT: Chronic | ICD-10-CM

## 2022-01-24 DIAGNOSIS — Z12.39 ENCOUNTER FOR OTHER SCREENING FOR MALIGNANT NEOPLASM OF BREAST: ICD-10-CM

## 2022-01-24 DIAGNOSIS — Z87.19 PERSONAL HISTORY OF OTHER DISEASES OF THE DIGESTIVE SYSTEM: Chronic | ICD-10-CM

## 2022-01-24 DIAGNOSIS — Z96.652 PRESENCE OF LEFT ARTIFICIAL KNEE JOINT: Chronic | ICD-10-CM

## 2022-01-24 PROCEDURE — 77067 SCR MAMMO BI INCL CAD: CPT | Mod: 26

## 2022-01-24 PROCEDURE — 77063 BREAST TOMOSYNTHESIS BI: CPT | Mod: 26

## 2022-01-24 PROCEDURE — 77063 BREAST TOMOSYNTHESIS BI: CPT

## 2022-01-24 PROCEDURE — 76641 ULTRASOUND BREAST COMPLETE: CPT | Mod: 26,50

## 2022-01-24 PROCEDURE — 76641 ULTRASOUND BREAST COMPLETE: CPT

## 2022-01-24 PROCEDURE — 77067 SCR MAMMO BI INCL CAD: CPT

## 2022-02-04 ENCOUNTER — APPOINTMENT (OUTPATIENT)
Dept: OBGYN | Facility: CLINIC | Age: 67
End: 2022-02-04

## 2022-02-25 ENCOUNTER — NON-APPOINTMENT (OUTPATIENT)
Age: 67
End: 2022-02-25

## 2022-03-28 ENCOUNTER — APPOINTMENT (OUTPATIENT)
Dept: OBGYN | Facility: CLINIC | Age: 67
End: 2022-03-28
Payer: MEDICARE

## 2022-03-28 VITALS
DIASTOLIC BLOOD PRESSURE: 76 MMHG | HEIGHT: 63 IN | SYSTOLIC BLOOD PRESSURE: 118 MMHG | BODY MASS INDEX: 39.51 KG/M2 | WEIGHT: 223 LBS

## 2022-03-28 PROCEDURE — G0101: CPT

## 2022-03-28 NOTE — DISCHARGE NOTE PROVIDER - DISCHARGE DATE
[address] [facility] [place] @ [facility] VISIT NOTE     1400 Patient arrives for IV Hydration Therapy (3 of 4) without acute problems. Please see connect care for complete assessment and education provided. Vital signs stable throughout and prior to discharge, Pt. Tolerated treatment well and discharged without incident. Patient is aware of next [facility] appointment on 04/08/2022. Appointment card given to patient. The patient/parent denies any symptoms of COVID (SOB, coughing, fever, or generally not feeling well), denies any known exposure to COVID-19 recently. Medications Verified by Pranav Gao RN :  1. Normal Saline 1,5000 ml IV Bolus over 1.5 hours  2. NS IV Flush     VITAL SIGNS  Patient Vitals for the past 12 hrs:   Temp Pulse BP   03/28/22 1554  89 124/83   03/28/22 1402 98.1 °F (36.7 °C) 82 126/87     LAB WORK  Labs Pending, please see connect care for results.    Recent Results (from the past 12 hour(s))   HGB & HCT    Collection Time: 03/28/22  2:09 PM   Result Value Ref Range    HGB 9.6 (L) 11.5 - 16.0 g/dL    HCT 32.4 (L) 35.0 - 47.0 %   PTH INTACT    Collection Time: 03/28/22  2:09 PM   Result Value Ref Range    Calcium 8.3 (L) 8.5 - 10.1 MG/DL    PTH, Intact PENDING pg/mL   IRON PROFILE    Collection Time: 03/28/22  2:09 PM   Result Value Ref Range    Iron 58 35 - 150 ug/dL    TIBC 484 (H) 250 - 450 ug/dL    Iron % saturation 12 (L) 20 - 50 %   RENAL FUNCTION PANEL    Collection Time: 03/28/22  2:09 PM   Result Value Ref Range    Sodium 135 (L) 136 - 145 mmol/L    Potassium 3.1 (L) 3.5 - 5.1 mmol/L    Chloride 106 97 - 108 mmol/L    CO2 23 21 - 32 mmol/L    Anion gap 6 5 - 15 mmol/L    Glucose 80 65 - 100 mg/dL    BUN 25 (H) 6 - 20 MG/DL    Creatinine 2.47 (H) 0.55 - 1.02 MG/DL    BUN/Creatinine ratio 10 (L) 12 - 20      GFR est AA 25 (L) >60 ml/min/1.73m2    GFR est non-AA 21 (L) >60 ml/min/1.73m2    Calcium 8.4 (L) 8.5 - 10.1 MG/DL    Phosphorus 2.1 (L) 2.6 - 4.7 MG/DL Albumin 3.6 3.5 - 5.0 g/dL 27-Mar-2020

## 2022-03-30 LAB — HPV HIGH+LOW RISK DNA PNL CVX: NOT DETECTED

## 2022-04-16 ENCOUNTER — RX RENEWAL (OUTPATIENT)
Age: 67
End: 2022-04-16

## 2022-05-06 ENCOUNTER — OUTPATIENT (OUTPATIENT)
Dept: OUTPATIENT SERVICES | Facility: HOSPITAL | Age: 67
LOS: 1 days | End: 2022-05-06
Payer: MEDICARE

## 2022-05-06 VITALS
DIASTOLIC BLOOD PRESSURE: 91 MMHG | OXYGEN SATURATION: 96 % | SYSTOLIC BLOOD PRESSURE: 151 MMHG | TEMPERATURE: 97 F | WEIGHT: 227.96 LBS | HEART RATE: 78 BPM | RESPIRATION RATE: 18 BRPM | HEIGHT: 61 IN

## 2022-05-06 DIAGNOSIS — Z98.84 BARIATRIC SURGERY STATUS: Chronic | ICD-10-CM

## 2022-05-06 DIAGNOSIS — Z98.890 OTHER SPECIFIED POSTPROCEDURAL STATES: Chronic | ICD-10-CM

## 2022-05-06 DIAGNOSIS — G47.33 OBSTRUCTIVE SLEEP APNEA (ADULT) (PEDIATRIC): ICD-10-CM

## 2022-05-06 DIAGNOSIS — D32.9 BENIGN NEOPLASM OF MENINGES, UNSPECIFIED: ICD-10-CM

## 2022-05-06 DIAGNOSIS — Z96.652 PRESENCE OF LEFT ARTIFICIAL KNEE JOINT: Chronic | ICD-10-CM

## 2022-05-06 DIAGNOSIS — Z87.39 PERSONAL HISTORY OF OTHER DISEASES OF THE MUSCULOSKELETAL SYSTEM AND CONNECTIVE TISSUE: Chronic | ICD-10-CM

## 2022-05-06 DIAGNOSIS — Z87.19 PERSONAL HISTORY OF OTHER DISEASES OF THE DIGESTIVE SYSTEM: Chronic | ICD-10-CM

## 2022-05-06 DIAGNOSIS — E03.9 HYPOTHYROIDISM, UNSPECIFIED: ICD-10-CM

## 2022-05-06 DIAGNOSIS — Z90.49 ACQUIRED ABSENCE OF OTHER SPECIFIED PARTS OF DIGESTIVE TRACT: Chronic | ICD-10-CM

## 2022-05-06 DIAGNOSIS — Z98.891 HISTORY OF UTERINE SCAR FROM PREVIOUS SURGERY: Chronic | ICD-10-CM

## 2022-05-06 LAB
ALBUMIN SERPL ELPH-MCNC: 4 G/DL — SIGNIFICANT CHANGE UP (ref 3.3–5)
ALP SERPL-CCNC: 106 U/L — SIGNIFICANT CHANGE UP (ref 40–120)
ALT FLD-CCNC: 18 U/L — SIGNIFICANT CHANGE UP (ref 4–33)
ANION GAP SERPL CALC-SCNC: 11 MMOL/L — SIGNIFICANT CHANGE UP (ref 7–14)
AST SERPL-CCNC: 14 U/L — SIGNIFICANT CHANGE UP (ref 4–32)
BILIRUB SERPL-MCNC: 0.6 MG/DL — SIGNIFICANT CHANGE UP (ref 0.2–1.2)
BLD GP AB SCN SERPL QL: NEGATIVE — SIGNIFICANT CHANGE UP
BUN SERPL-MCNC: 18 MG/DL — SIGNIFICANT CHANGE UP (ref 7–23)
CALCIUM SERPL-MCNC: 9.8 MG/DL — SIGNIFICANT CHANGE UP (ref 8.4–10.5)
CHLORIDE SERPL-SCNC: 104 MMOL/L — SIGNIFICANT CHANGE UP (ref 98–107)
CO2 SERPL-SCNC: 24 MMOL/L — SIGNIFICANT CHANGE UP (ref 22–31)
CREAT SERPL-MCNC: 0.75 MG/DL — SIGNIFICANT CHANGE UP (ref 0.5–1.3)
EGFR: 88 ML/MIN/1.73M2 — SIGNIFICANT CHANGE UP
GLUCOSE SERPL-MCNC: 86 MG/DL — SIGNIFICANT CHANGE UP (ref 70–99)
HCT VFR BLD CALC: 39 % — SIGNIFICANT CHANGE UP (ref 34.5–45)
HGB BLD-MCNC: 13.1 G/DL — SIGNIFICANT CHANGE UP (ref 11.5–15.5)
MCHC RBC-ENTMCNC: 28.8 PG — SIGNIFICANT CHANGE UP (ref 27–34)
MCHC RBC-ENTMCNC: 33.6 GM/DL — SIGNIFICANT CHANGE UP (ref 32–36)
MCV RBC AUTO: 85.7 FL — SIGNIFICANT CHANGE UP (ref 80–100)
NRBC # BLD: 0 /100 WBCS — SIGNIFICANT CHANGE UP
NRBC # FLD: 0 K/UL — SIGNIFICANT CHANGE UP
PLATELET # BLD AUTO: 189 K/UL — SIGNIFICANT CHANGE UP (ref 150–400)
POTASSIUM SERPL-MCNC: 5 MMOL/L — SIGNIFICANT CHANGE UP (ref 3.5–5.3)
POTASSIUM SERPL-SCNC: 5 MMOL/L — SIGNIFICANT CHANGE UP (ref 3.5–5.3)
PROT SERPL-MCNC: 6.5 G/DL — SIGNIFICANT CHANGE UP (ref 6–8.3)
RBC # BLD: 4.55 M/UL — SIGNIFICANT CHANGE UP (ref 3.8–5.2)
RBC # FLD: 12.7 % — SIGNIFICANT CHANGE UP (ref 10.3–14.5)
RH IG SCN BLD-IMP: POSITIVE — SIGNIFICANT CHANGE UP
SODIUM SERPL-SCNC: 139 MMOL/L — SIGNIFICANT CHANGE UP (ref 135–145)
WBC # BLD: 6.1 K/UL — SIGNIFICANT CHANGE UP (ref 3.8–10.5)
WBC # FLD AUTO: 6.1 K/UL — SIGNIFICANT CHANGE UP (ref 3.8–10.5)

## 2022-05-06 PROCEDURE — 93010 ELECTROCARDIOGRAM REPORT: CPT

## 2022-05-06 RX ORDER — ASPIRIN/CALCIUM CARB/MAGNESIUM 324 MG
0 TABLET ORAL
Qty: 0 | Refills: 0 | DISCHARGE

## 2022-05-06 RX ORDER — COLESTIPOL HCL 5 G
4 GRANULES (GRAM) ORAL
Qty: 0 | Refills: 0 | DISCHARGE

## 2022-05-06 RX ORDER — PANTOPRAZOLE SODIUM 20 MG/1
1 TABLET, DELAYED RELEASE ORAL
Qty: 0 | Refills: 0 | DISCHARGE

## 2022-05-06 RX ORDER — ALUMINUM ZIRCONIUM TRICHLOROHYDREX GLY 0.2 G/G
1 STICK TOPICAL
Qty: 0 | Refills: 0 | DISCHARGE

## 2022-05-06 RX ORDER — ALBUTEROL 90 UG/1
3 AEROSOL, METERED ORAL
Qty: 0 | Refills: 0 | DISCHARGE

## 2022-05-06 RX ORDER — MULTIVIT-MIN/FERROUS GLUCONATE 9 MG/15 ML
1 LIQUID (ML) ORAL
Qty: 0 | Refills: 0 | DISCHARGE

## 2022-05-06 RX ORDER — FLUTICASONE PROPIONATE 50 MCG
1 SPRAY, SUSPENSION NASAL
Qty: 0 | Refills: 0 | DISCHARGE

## 2022-05-06 RX ORDER — SODIUM CHLORIDE 9 MG/ML
1000 INJECTION, SOLUTION INTRAVENOUS
Refills: 0 | Status: DISCONTINUED | OUTPATIENT
Start: 2022-05-24 | End: 2022-05-25

## 2022-05-06 RX ORDER — LEVOTHYROXINE SODIUM 125 MCG
1 TABLET ORAL
Qty: 0 | Refills: 0 | DISCHARGE

## 2022-05-06 NOTE — H&P PST ADULT - NSICDXPASTSURGICALHX_GEN_ALL_CORE_FT
PAST SURGICAL HISTORY:  H/O hiatal hernia     H/O total knee replacement, left     H/O umbilical hernia repair     History of  section X2    .    History of trigger finger right    S/P cholecystectomy     S/P left knee arthroscopy     Status post gastric banding band removed and sleeve procedure done     PAST SURGICAL HISTORY:  H/O hiatal hernia     H/O total knee replacement, left     H/O umbilical hernia repair     History of  section X2    .    History of trigger finger right    S/P cholecystectomy     S/P left knee arthroscopy     Status post endoscopy     Status post gastric banding band removed and sleeve procedure done

## 2022-05-06 NOTE — H&P PST ADULT - HISTORY OF PRESENT ILLNESS
68 y/o female with H/O: GERD, history of gastric sleeve. Complain of diarrhea and "acid in my stomach". Scheduled for upper endoscopy.     h/o persistent headaches lasting for days. S/P MRI on 2020 showed a spot,. MRI repeated 1 year later on 11/2021 66 y/o female with H/O: GERD, history of gastric sleeve. Complain of diarrhea and "acid in my stomach". Scheduled for upper endoscopy.     h/o persistent headaches lasting for days. S/P MRI on 2020 showed a spot,. MRI repeated 1 year later on 11/2021 showed that the tumor has grown 68 y/o female with H/O: Hypothyroidism, GERD, Obesity presents for pre op evaluation with h/o persistent headaches lasting for days. S/P MRI on 2020 showed "a spot,. MRI repeated 1 year later on 11/2021 showed that the tumor has grown". Now schedule for stereotactic craniotomy for resection of left middle cranial fossa meningioma

## 2022-05-06 NOTE — H&P PST ADULT - NSICDXPASTMEDICALHX_GEN_ALL_CORE_FT
PAST MEDICAL HISTORY:  Arthritis     Degenerative disc disease, lumbar     Gastroesophageal reflux disease, esophagitis presence not specified     Knee pain, bilateral left worse    Lung nodule     Obesity     Rectal cancer 2016 chemo radiation    S/P bariatric surgery sleeve gastrectomy    Sleep apnea cpap  pressure at 7    Thrombocytopenia while on chemo    Vitamin D deficiency      PAST MEDICAL HISTORY:  Arthritis     Bilateral cataracts     Degenerative disc disease, lumbar     Gastroesophageal reflux disease, esophagitis presence not specified     History of chemotherapy     History of radiation therapy     Knee pain, bilateral left worse    Lung nodule     Obesity     Rectal cancer 2016 chemo radiation    S/P bariatric surgery sleeve gastrectomy    Sleep apnea cpap  pressure at 7    Thrombocytopenia while on chemo    Vitamin D deficiency

## 2022-05-06 NOTE — H&P PST ADULT - NEGATIVE ENMT SYMPTOMS
no ear pain/no sinus symptoms/no nasal congestion/no nasal obstruction/no abnormal taste sensation/no throat pain/no dysphagia

## 2022-05-12 PROBLEM — Z92.3 PERSONAL HISTORY OF IRRADIATION: Chronic | Status: ACTIVE | Noted: 2022-05-06

## 2022-05-12 PROBLEM — Z92.21 PERSONAL HISTORY OF ANTINEOPLASTIC CHEMOTHERAPY: Chronic | Status: ACTIVE | Noted: 2022-05-06

## 2022-05-16 ENCOUNTER — OUTPATIENT (OUTPATIENT)
Dept: OUTPATIENT SERVICES | Facility: HOSPITAL | Age: 67
LOS: 1 days | End: 2022-05-16
Payer: MEDICARE

## 2022-05-16 ENCOUNTER — APPOINTMENT (OUTPATIENT)
Dept: MRI IMAGING | Facility: CLINIC | Age: 67
End: 2022-05-16
Payer: MEDICARE

## 2022-05-16 DIAGNOSIS — Z87.19 PERSONAL HISTORY OF OTHER DISEASES OF THE DIGESTIVE SYSTEM: Chronic | ICD-10-CM

## 2022-05-16 DIAGNOSIS — Z90.49 ACQUIRED ABSENCE OF OTHER SPECIFIED PARTS OF DIGESTIVE TRACT: Chronic | ICD-10-CM

## 2022-05-16 DIAGNOSIS — Z87.39 PERSONAL HISTORY OF OTHER DISEASES OF THE MUSCULOSKELETAL SYSTEM AND CONNECTIVE TISSUE: Chronic | ICD-10-CM

## 2022-05-16 DIAGNOSIS — Z98.890 OTHER SPECIFIED POSTPROCEDURAL STATES: Chronic | ICD-10-CM

## 2022-05-16 DIAGNOSIS — Z96.652 PRESENCE OF LEFT ARTIFICIAL KNEE JOINT: Chronic | ICD-10-CM

## 2022-05-16 DIAGNOSIS — D32.9 BENIGN NEOPLASM OF MENINGES, UNSPECIFIED: ICD-10-CM

## 2022-05-16 DIAGNOSIS — Z98.84 BARIATRIC SURGERY STATUS: Chronic | ICD-10-CM

## 2022-05-16 DIAGNOSIS — Z98.891 HISTORY OF UTERINE SCAR FROM PREVIOUS SURGERY: Chronic | ICD-10-CM

## 2022-05-16 PROCEDURE — A9585: CPT

## 2022-05-16 PROCEDURE — 70553 MRI BRAIN STEM W/O & W/DYE: CPT | Mod: 26,MH

## 2022-05-16 PROCEDURE — 70553 MRI BRAIN STEM W/O & W/DYE: CPT | Mod: MH

## 2022-05-23 ENCOUNTER — TRANSCRIPTION ENCOUNTER (OUTPATIENT)
Age: 67
End: 2022-05-23

## 2022-05-23 NOTE — ASU PATIENT PROFILE, ADULT - NSICDXPASTMEDICALHX_GEN_ALL_CORE_FT
PAST MEDICAL HISTORY:  Arthritis     Bilateral cataracts     Degenerative disc disease, lumbar     Gastroesophageal reflux disease, esophagitis presence not specified     History of chemotherapy     History of radiation therapy     Knee pain, bilateral left worse    Lung nodule     Obesity     Rectal cancer 2016 chemo radiation    S/P bariatric surgery sleeve gastrectomy    Sleep apnea cpap  pressure at 7    Thrombocytopenia while on chemo    Vitamin D deficiency

## 2022-05-23 NOTE — ASU PATIENT PROFILE, ADULT - FALL HARM RISK - UNIVERSAL INTERVENTIONS
Bed in lowest position, wheels locked, appropriate side rails in place/Call bell, personal items and telephone in reach/Instruct patient to call for assistance before getting out of bed or chair/Non-slip footwear when patient is out of bed/[place] to call system/Physically safe environment - no spills, clutter or unnecessary equipment/Purposeful Proactive Rounding/Room/bathroom lighting operational, light cord in reach

## 2022-05-23 NOTE — ASU PATIENT PROFILE, ADULT - NSICDXPASTSURGICALHX_GEN_ALL_CORE_FT
PAST SURGICAL HISTORY:  H/O hiatal hernia     H/O total knee replacement, left     H/O umbilical hernia repair     History of  section X2    .    History of trigger finger right    S/P cholecystectomy     S/P left knee arthroscopy     Status post endoscopy     Status post gastric banding band removed and sleeve procedure done

## 2022-05-24 ENCOUNTER — RESULT REVIEW (OUTPATIENT)
Age: 67
End: 2022-05-24

## 2022-05-24 ENCOUNTER — TRANSCRIPTION ENCOUNTER (OUTPATIENT)
Age: 67
End: 2022-05-24

## 2022-05-24 ENCOUNTER — INPATIENT (INPATIENT)
Facility: HOSPITAL | Age: 67
LOS: 1 days | Discharge: HOME | End: 2022-05-26
Attending: NEUROLOGICAL SURGERY | Admitting: NEUROLOGICAL SURGERY
Payer: MEDICARE

## 2022-05-24 VITALS
DIASTOLIC BLOOD PRESSURE: 96 MMHG | HEART RATE: 72 BPM | HEIGHT: 61 IN | WEIGHT: 227.96 LBS | TEMPERATURE: 98 F | RESPIRATION RATE: 16 BRPM | SYSTOLIC BLOOD PRESSURE: 153 MMHG | OXYGEN SATURATION: 98 %

## 2022-05-24 DIAGNOSIS — Z87.19 PERSONAL HISTORY OF OTHER DISEASES OF THE DIGESTIVE SYSTEM: Chronic | ICD-10-CM

## 2022-05-24 DIAGNOSIS — D32.9 BENIGN NEOPLASM OF MENINGES, UNSPECIFIED: ICD-10-CM

## 2022-05-24 DIAGNOSIS — Z98.890 OTHER SPECIFIED POSTPROCEDURAL STATES: Chronic | ICD-10-CM

## 2022-05-24 DIAGNOSIS — Z98.891 HISTORY OF UTERINE SCAR FROM PREVIOUS SURGERY: Chronic | ICD-10-CM

## 2022-05-24 DIAGNOSIS — Z96.652 PRESENCE OF LEFT ARTIFICIAL KNEE JOINT: Chronic | ICD-10-CM

## 2022-05-24 DIAGNOSIS — Z98.84 BARIATRIC SURGERY STATUS: Chronic | ICD-10-CM

## 2022-05-24 DIAGNOSIS — Z87.39 PERSONAL HISTORY OF OTHER DISEASES OF THE MUSCULOSKELETAL SYSTEM AND CONNECTIVE TISSUE: Chronic | ICD-10-CM

## 2022-05-24 DIAGNOSIS — Z90.49 ACQUIRED ABSENCE OF OTHER SPECIFIED PARTS OF DIGESTIVE TRACT: Chronic | ICD-10-CM

## 2022-05-24 LAB
ANION GAP SERPL CALC-SCNC: 14 MMOL/L — SIGNIFICANT CHANGE UP (ref 7–14)
BUN SERPL-MCNC: 14 MG/DL — SIGNIFICANT CHANGE UP (ref 7–23)
CALCIUM SERPL-MCNC: 9.1 MG/DL — SIGNIFICANT CHANGE UP (ref 8.4–10.5)
CHLORIDE SERPL-SCNC: 104 MMOL/L — SIGNIFICANT CHANGE UP (ref 98–107)
CO2 SERPL-SCNC: 19 MMOL/L — LOW (ref 22–31)
CREAT SERPL-MCNC: 0.7 MG/DL — SIGNIFICANT CHANGE UP (ref 0.5–1.3)
EGFR: 95 ML/MIN/1.73M2 — SIGNIFICANT CHANGE UP
GAS PNL BLDA: SIGNIFICANT CHANGE UP
GAS PNL BLDA: SIGNIFICANT CHANGE UP
GLUCOSE SERPL-MCNC: 175 MG/DL — HIGH (ref 70–99)
HCT VFR BLD CALC: 37.6 % — SIGNIFICANT CHANGE UP (ref 34.5–45)
HGB BLD-MCNC: 12.5 G/DL — SIGNIFICANT CHANGE UP (ref 11.5–15.5)
MAGNESIUM SERPL-MCNC: 1.9 MG/DL — SIGNIFICANT CHANGE UP (ref 1.6–2.6)
MCHC RBC-ENTMCNC: 28.5 PG — SIGNIFICANT CHANGE UP (ref 27–34)
MCHC RBC-ENTMCNC: 33.2 GM/DL — SIGNIFICANT CHANGE UP (ref 32–36)
MCV RBC AUTO: 85.6 FL — SIGNIFICANT CHANGE UP (ref 80–100)
NRBC # BLD: 0 /100 WBCS — SIGNIFICANT CHANGE UP
NRBC # FLD: 0 K/UL — SIGNIFICANT CHANGE UP
PHOSPHATE SERPL-MCNC: 3.8 MG/DL — SIGNIFICANT CHANGE UP (ref 2.5–4.5)
PLATELET # BLD AUTO: 178 K/UL — SIGNIFICANT CHANGE UP (ref 150–400)
POTASSIUM SERPL-MCNC: 4.2 MMOL/L — SIGNIFICANT CHANGE UP (ref 3.5–5.3)
POTASSIUM SERPL-SCNC: 4.2 MMOL/L — SIGNIFICANT CHANGE UP (ref 3.5–5.3)
RBC # BLD: 4.39 M/UL — SIGNIFICANT CHANGE UP (ref 3.8–5.2)
RBC # FLD: 12.3 % — SIGNIFICANT CHANGE UP (ref 10.3–14.5)
SODIUM SERPL-SCNC: 137 MMOL/L — SIGNIFICANT CHANGE UP (ref 135–145)
WBC # BLD: 6.87 K/UL — SIGNIFICANT CHANGE UP (ref 3.8–10.5)
WBC # FLD AUTO: 6.87 K/UL — SIGNIFICANT CHANGE UP (ref 3.8–10.5)

## 2022-05-24 PROCEDURE — 88331 PATH CONSLTJ SURG 1 BLK 1SPC: CPT | Mod: 26

## 2022-05-24 PROCEDURE — 88307 TISSUE EXAM BY PATHOLOGIST: CPT | Mod: 26

## 2022-05-24 PROCEDURE — 88334 PATH CONSLTJ SURG CYTO XM EA: CPT | Mod: 26,59

## 2022-05-24 PROCEDURE — 88341 IMHCHEM/IMCYTCHM EA ADD ANTB: CPT | Mod: 26,59

## 2022-05-24 PROCEDURE — 88342 IMHCHEM/IMCYTCHM 1ST ANTB: CPT | Mod: 26,59

## 2022-05-24 PROCEDURE — 70450 CT HEAD/BRAIN W/O DYE: CPT | Mod: 26,GC

## 2022-05-24 PROCEDURE — 88360 TUMOR IMMUNOHISTOCHEM/MANUAL: CPT | Mod: 26

## 2022-05-24 DEVICE — BONE WAX 2.5GM: Type: IMPLANTABLE DEVICE | Status: FUNCTIONAL

## 2022-05-24 DEVICE — SURGIFLO MATRIX WITH THROMBIN KIT: Type: IMPLANTABLE DEVICE | Status: FUNCTIONAL

## 2022-05-24 DEVICE — SURGIFOAM PAD 8CM X 12.5CM X 2MM (100C): Type: IMPLANTABLE DEVICE | Status: FUNCTIONAL

## 2022-05-24 DEVICE — GRAFT DURAL MATRX 3X3 DUREPAIR SYNTHETIC: Type: IMPLANTABLE DEVICE | Status: FUNCTIONAL

## 2022-05-24 DEVICE — SURGICEL 2 X 14": Type: IMPLANTABLE DEVICE | Status: FUNCTIONAL

## 2022-05-24 DEVICE — SCREW UN3 AXS SELF DRILL 1.5X4MM 5/PK: Type: IMPLANTABLE DEVICE | Status: FUNCTIONAL

## 2022-05-24 DEVICE — PLATE BURR 10MM HOLE COVER: Type: IMPLANTABLE DEVICE | Status: FUNCTIONAL

## 2022-05-24 DEVICE — SCREW 1.5X5MM: Type: IMPLANTABLE DEVICE | Status: FUNCTIONAL

## 2022-05-24 DEVICE — IMP TEMPORAL MALLEABLE: Type: IMPLANTABLE DEVICE | Status: FUNCTIONAL

## 2022-05-24 DEVICE — MATRIX DURAGEN PLUS DURAL REGENERATION 3X3: Type: IMPLANTABLE DEVICE | Status: FUNCTIONAL

## 2022-05-24 RX ORDER — LEVETIRACETAM 250 MG/1
500 TABLET, FILM COATED ORAL
Refills: 0 | Status: DISCONTINUED | OUTPATIENT
Start: 2022-05-24 | End: 2022-05-26

## 2022-05-24 RX ORDER — LEVOTHYROXINE SODIUM 125 MCG
25 TABLET ORAL DAILY
Refills: 0 | Status: DISCONTINUED | OUTPATIENT
Start: 2022-05-24 | End: 2022-05-26

## 2022-05-24 RX ORDER — DEXAMETHASONE 0.5 MG/5ML
2 ELIXIR ORAL EVERY 8 HOURS
Refills: 0 | Status: DISCONTINUED | OUTPATIENT
Start: 2022-05-24 | End: 2022-05-24

## 2022-05-24 RX ORDER — PANTOPRAZOLE SODIUM 20 MG/1
40 TABLET, DELAYED RELEASE ORAL
Refills: 0 | Status: DISCONTINUED | OUTPATIENT
Start: 2022-05-24 | End: 2022-05-26

## 2022-05-24 RX ORDER — ACETAMINOPHEN 500 MG
1000 TABLET ORAL EVERY 6 HOURS
Refills: 0 | Status: COMPLETED | OUTPATIENT
Start: 2022-05-24 | End: 2022-05-25

## 2022-05-24 RX ORDER — LIDOCAINE 4 G/100G
1 CREAM TOPICAL EVERY 24 HOURS
Refills: 0 | Status: DISCONTINUED | OUTPATIENT
Start: 2022-05-24 | End: 2022-05-26

## 2022-05-24 RX ORDER — HYDRALAZINE HCL 50 MG
10 TABLET ORAL ONCE
Refills: 0 | Status: COMPLETED | OUTPATIENT
Start: 2022-05-24 | End: 2022-05-24

## 2022-05-24 RX ORDER — CEFAZOLIN SODIUM 1 G
1000 VIAL (EA) INJECTION EVERY 8 HOURS
Refills: 0 | Status: COMPLETED | OUTPATIENT
Start: 2022-05-24 | End: 2022-05-25

## 2022-05-24 RX ORDER — DEXAMETHASONE 0.5 MG/5ML
2 ELIXIR ORAL EVERY 8 HOURS
Refills: 0 | Status: DISCONTINUED | OUTPATIENT
Start: 2022-05-24 | End: 2022-05-26

## 2022-05-24 RX ORDER — OXYCODONE HYDROCHLORIDE 5 MG/1
5 TABLET ORAL EVERY 4 HOURS
Refills: 0 | Status: DISCONTINUED | OUTPATIENT
Start: 2022-05-24 | End: 2022-05-26

## 2022-05-24 RX ORDER — LEVOTHYROXINE SODIUM 125 MCG
25 TABLET ORAL DAILY
Refills: 0 | Status: DISCONTINUED | OUTPATIENT
Start: 2022-05-24 | End: 2022-05-24

## 2022-05-24 RX ORDER — PANTOPRAZOLE SODIUM 20 MG/1
40 TABLET, DELAYED RELEASE ORAL
Refills: 0 | Status: DISCONTINUED | OUTPATIENT
Start: 2022-05-24 | End: 2022-05-24

## 2022-05-24 RX ADMIN — Medication 10 MILLIGRAM(S): at 21:03

## 2022-05-24 RX ADMIN — Medication 2 MILLIGRAM(S): at 21:02

## 2022-05-24 RX ADMIN — OXYCODONE HYDROCHLORIDE 5 MILLIGRAM(S): 5 TABLET ORAL at 22:35

## 2022-05-24 RX ADMIN — Medication 1000 MILLIGRAM(S): at 18:00

## 2022-05-24 RX ADMIN — SODIUM CHLORIDE 30 MILLILITER(S): 9 INJECTION, SOLUTION INTRAVENOUS at 16:10

## 2022-05-24 RX ADMIN — LIDOCAINE 1 PATCH: 4 CREAM TOPICAL at 21:02

## 2022-05-24 RX ADMIN — OXYCODONE HYDROCHLORIDE 5 MILLIGRAM(S): 5 TABLET ORAL at 18:00

## 2022-05-24 RX ADMIN — LEVETIRACETAM 500 MILLIGRAM(S): 250 TABLET, FILM COATED ORAL at 17:34

## 2022-05-24 RX ADMIN — OXYCODONE HYDROCHLORIDE 5 MILLIGRAM(S): 5 TABLET ORAL at 17:34

## 2022-05-24 RX ADMIN — SODIUM CHLORIDE 30 MILLILITER(S): 9 INJECTION, SOLUTION INTRAVENOUS at 22:04

## 2022-05-24 RX ADMIN — Medication 100 MILLIGRAM(S): at 21:01

## 2022-05-24 RX ADMIN — Medication 10 MILLIGRAM(S): at 19:09

## 2022-05-24 RX ADMIN — OXYCODONE HYDROCHLORIDE 5 MILLIGRAM(S): 5 TABLET ORAL at 22:05

## 2022-05-24 RX ADMIN — Medication 400 MILLIGRAM(S): at 17:21

## 2022-05-24 NOTE — PATIENT PROFILE ADULT - FALL HARM RISK - HARM RISK INTERVENTIONS
Assistance with ambulation/Assistance OOB with selected safe patient handling equipment/Communicate Risk of Fall with Harm to all staff/Monitor gait and stability/Reinforce activity limits and safety measures with patient and family/Review medications for side effects contributing to fall risk/Sit up slowly, dangle for a short time, stand at bedside before walking/Tailored Fall Risk Interventions/Toileting schedule using arm’s reach rule for commode and bathroom/Use of alarms - bed, chair and/or voice tab/Visual Cue: Yellow wristband and red socks/Bed in lowest position, wheels locked, appropriate side rails in place/Call bell, personal items and telephone in reach/Instruct patient to call for assistance before getting out of bed or chair/Non-slip footwear when patient is out of bed/[place] to call system/Physically safe environment - no spills, clutter or unnecessary equipment/Purposeful Proactive Rounding/Room/bathroom lighting operational, light cord in reach Assistance with ambulation/Assistance OOB with selected safe patient handling equipment/Communicate Risk of Fall with Harm to all staff/Discuss with provider need for PT consult/Monitor gait and stability/Provide patient with walking aids - walker, cane, crutches/Reinforce activity limits and safety measures with patient and family/Review medications for side effects contributing to fall risk/Sit up slowly, dangle for a short time, stand at bedside before walking/Tailored Fall Risk Interventions/Toileting schedule using arm’s reach rule for commode and bathroom/Use of alarms - bed, chair and/or voice tab/Visual Cue: Yellow wristband and red socks/Bed in lowest position, wheels locked, appropriate side rails in place/Call bell, personal items and telephone in reach/Instruct patient to call for assistance before getting out of bed or chair/Non-slip footwear when patient is out of bed/[place] to call system/Physically safe environment - no spills, clutter or unnecessary equipment/Purposeful Proactive Rounding/Room/bathroom lighting operational, light cord in reach

## 2022-05-24 NOTE — CONSULT NOTE ADULT - SUBJECTIVE AND OBJECTIVE BOX
SICU Consult Note  =====================================================    HPI: 66 y/o female with PMHx hypothyroidism, GIOVANNA (on CPAP), obesity, rectal CA s/p chemo and radiation in 2016, sleeve gastrectomy, found to have meningioma now s/p left middle cranial fossa meningioma on 2022. SICU consulted for q1hr neuro checks and monitoring.     PAST MEDICAL & SURGICAL HISTORY:  Lung nodule  Degenerative disc disease, lumbar  Thrombocytopenia  Vitamin D deficiency  Gastroesophageal reflux disease, esophagitis presence not specified  Rectal cancer 2016 chemo radiation  Arthritis  Sleep apnea on cpap   Obesity  S/P bariatric surgery  sleeve gastrectomy  Bilateral cataracts  S/P left knee arthroscopy  H/O umbilical hernia repair  Status post gastric banding  band removed and sleeve procedure done  History of  section x2  S/P cholecystectomy  H/O hiatal hernia  History of trigger finger  H/O total knee replacement, left    ALLERGIES:  No Known Allergies    --------------------------------------------------------------------------------------    MEDICATIONS:  Neurologic Medications  acetaminophen   IVPB .. 1000 milliGRAM(s) IV Intermittent every 6 hours  levETIRAcetam 500 milliGRAM(s) Oral two times a day  oxyCODONE    IR 5 milliGRAM(s) Oral every 4 hours PRN Moderate Pain (4 - 6)    Gastrointestinal Medications  lactated ringers. 1000 milliLiter(s) IV Continuous <Continuous>  pantoprazole    Tablet 40 milliGRAM(s) Oral before breakfast      Antimicrobial/Immunologic Medications  ceFAZolin   IVPB 1000 milliGRAM(s) IV Intermittent every 8 hours    Endocrine/Metabolic Medications  dexAMETHasone  Injectable 2 milliGRAM(s) IV Push every 8 hours  levothyroxine 25 MICROGram(s) Oral daily      --------------------------------------------------------------------------------------    VITAL SIGNS:  ICU Vital Signs Last 24 Hrs  T(C): 36.4 (24 May 2022 15:48), Max: 36.7 (24 May 2022 06:17)  T(F): 97.5 (24 May 2022 15:48), Max: 98.1 (24 May 2022 06:17)  HR: 76 (24 May 2022 15:48) (72 - 76)  BP: 145/113 (24 May 2022 15:48) (145/113 - 153/96)  ABP: 160/78 (24 May 2022 15:48) (160/78 - 160/78)  ABP(mean): 113 (24 May 2022 15:48) (113 - 113)  RR: 16 (24 May 2022 15:48) (16 - 16)  SpO2: 92% (24 May 2022 15:48) (92% - 98%)    --------------------------------------------------------------------------------------      EXAM  NEUROLOGY  Exam: Normal, in no acute distress.  Alert and oriented x4.  No focal neurologic deficits.     HEENT:   Exam: Dressing in place, no breakthrough.  JPx1 SS.     RESPIRATORY  Exam: Satting well on 3L NC. No respiratory distress.     CARDIOVASCULAR  Exam: S1, S2.  Regular rate and rhythm on monitor     GI/NUTRITION  Exam: Abdomen soft, Non-tender, Non-distended.      VASCULAR  Exam: Extremities warm.     MUSCULOSKELETAL  Exam: All extremities moving spontaneously without limitations.      HEMATOLOGIC  [] VTE Prophylaxis:   Transfusions:	[] PRBC	[] Platelets		[] FFP	[] Cryoprecipitate      TUBES / LINES / DRAINS    [x] Peripheral IV  [] Central Venous Line     	[] R	[] L	[] IJ	[] Fem	[] SC	Date Placed:   [X] Arterial Line		[] R	[X] L	[] Fem	[X] Rad	[] Ax	Date Placed:   [] PICC		[] Midline		[] Mediport  [X] Urinary Catheter		Date Placed: 22  [x] Necessity of urinary, arterial, and venous catheters discussed

## 2022-05-24 NOTE — CONSULT NOTE ADULT - ASSESSMENT
ASSESSMENT:  68 y/o female with PMHx hypothyroidism, GIOVANNA (on CPAP), obesity, rectal CA s/p chemo and radiation in 2016, sleeve gastrectomy, found to have meningioma now s/p left middle cranial fossa meningioma on 05/24/2022. SICU consulted for q1hr neuro checks and monitoring.       PLAN:     NEUROLOGY:  - A&Ox3  - pain control with Tylenol and oxy prn  - keppra 500 bid ppx  - monitor FOREST drain output     RESPIRATORY:  - hx of GIOVANNA on CPAP at home   - continue to monitor on NC  - Maintain O2 saturation >92%    CARDIOVASCULAR:  - required 10mg push of labetolol during extubation, continue to monitor BP  - maintain systolic <160 per neurosx  - MAP >65    GI / NUTRITION:  - hx of GERD, protonix   - Regular diet    RENAL / GENITOURINARY:  - Indwelling mejia catheter  - Monitor electrolytes and replete PRN  - Continue to monitor strict ins and outs q1 hour    HEMATOLOGIC:  - hold DVT ppx 24 hrs post op     INFECTIOUS DISEASE:  - Currently afebrile with no leukocytosis  - Ancef x24 hrs     ENDOCRINOLOGY:  - hx of hypothyroidism, continue home levothyroxine  - Continue to monitor glucose on BMP (goal 140-180)  - decadron 2q8hrs per neurosx    Disposition: SICU   ASSESSMENT:  66 y/o female with PMHx hypothyroidism, GIOVANNA (on CPAP), obesity, rectal CA s/p chemo and radiation in 2016, sleeve gastrectomy, found to have meningioma now s/p left middle cranial fossa meningioma on 05/24/2022. SICU consulted for q1hr neuro checks and monitoring.       PLAN:     NEUROLOGY:  - A&Ox3  - q1 hr neuro checks  - pain control with Tylenol and oxy prn  - keppra 500 bid ppx  - monitor FOREST drain output     RESPIRATORY:  - hx of GIOVANNA on CPAP at home   - continue to monitor on NC  - Maintain O2 saturation >92%    CARDIOVASCULAR:  - required 10mg push of labetolol during extubation, continue to monitor BP  - maintain systolic <160 per neurosx  - MAP >65    GI / NUTRITION:  - hx of GERD, protonix   - Regular diet    RENAL / GENITOURINARY:  - Indwelling mejia catheter  - Monitor electrolytes and replete PRN  - Continue to monitor strict ins and outs q1 hour    HEMATOLOGIC:  - hold DVT ppx 24 hrs post op     INFECTIOUS DISEASE:  - Currently afebrile with no leukocytosis  - Ancef x24 hrs     ENDOCRINOLOGY:  - hx of hypothyroidism, continue home levothyroxine  - Continue to monitor glucose on BMP (goal 140-180)  - decadron 2q8hrs per neurosx    Disposition: SICU

## 2022-05-24 NOTE — ASU PREOP CHECKLIST - SKIN PREP
n/a
Charcot's arthropathy  R foot  Chronic midline low back pain with bilateral sciatica    Dextrocardia    Diabetes 1.5, managed as type 2    Essential hypertension    Peripheral neuralgia

## 2022-05-24 NOTE — PATIENT PROFILE ADULT - FALL HARM RISK - FALL HARM RISK
Health Maintenance Due   Topic Date Due    Hepatitis C Screening  1954    Lipid Panel  1955    HIV Screening  1970    TETANUS VACCINE  1973    Pneumococcal Vaccine (Highest Risk) (1 of 3 - PCV13) 1974    Cervical Cancer Screening  1976    Mammogram  1995    Shingles Vaccine (1 of 2) 2005    Colorectal Cancer Screening  2005       Chart review completed 2020.  Care Everywhere updates requested and reviewed.  Immunizations reconciled. Media reports reviewed.  Duplicate HM overrides and  orders removed.  Overdue HM topic chart audit and/or requested.  Overdue lab testing linked to upcoming lab appointments if applies.           Surgery

## 2022-05-25 LAB
ANION GAP SERPL CALC-SCNC: 11 MMOL/L — SIGNIFICANT CHANGE UP (ref 7–14)
ANION GAP SERPL CALC-SCNC: 14 MMOL/L — SIGNIFICANT CHANGE UP (ref 7–14)
BUN SERPL-MCNC: 12 MG/DL — SIGNIFICANT CHANGE UP (ref 7–23)
BUN SERPL-MCNC: 15 MG/DL — SIGNIFICANT CHANGE UP (ref 7–23)
CALCIUM SERPL-MCNC: 9.7 MG/DL — SIGNIFICANT CHANGE UP (ref 8.4–10.5)
CALCIUM SERPL-MCNC: 9.7 MG/DL — SIGNIFICANT CHANGE UP (ref 8.4–10.5)
CHLORIDE SERPL-SCNC: 105 MMOL/L — SIGNIFICANT CHANGE UP (ref 98–107)
CHLORIDE SERPL-SCNC: 105 MMOL/L — SIGNIFICANT CHANGE UP (ref 98–107)
CHLORIDE UR-SCNC: 99 MMOL/L — SIGNIFICANT CHANGE UP
CO2 SERPL-SCNC: 20 MMOL/L — LOW (ref 22–31)
CO2 SERPL-SCNC: 23 MMOL/L — SIGNIFICANT CHANGE UP (ref 22–31)
CREAT SERPL-MCNC: 0.65 MG/DL — SIGNIFICANT CHANGE UP (ref 0.5–1.3)
CREAT SERPL-MCNC: 0.66 MG/DL — SIGNIFICANT CHANGE UP (ref 0.5–1.3)
EGFR: 97 ML/MIN/1.73M2 — SIGNIFICANT CHANGE UP
EGFR: 97 ML/MIN/1.73M2 — SIGNIFICANT CHANGE UP
GLUCOSE SERPL-MCNC: 139 MG/DL — HIGH (ref 70–99)
GLUCOSE SERPL-MCNC: 140 MG/DL — HIGH (ref 70–99)
HCT VFR BLD CALC: 36.7 % — SIGNIFICANT CHANGE UP (ref 34.5–45)
HGB BLD-MCNC: 12.6 G/DL — SIGNIFICANT CHANGE UP (ref 11.5–15.5)
MAGNESIUM SERPL-MCNC: 2.1 MG/DL — SIGNIFICANT CHANGE UP (ref 1.6–2.6)
MAGNESIUM SERPL-MCNC: 2.1 MG/DL — SIGNIFICANT CHANGE UP (ref 1.6–2.6)
MCHC RBC-ENTMCNC: 27.9 PG — SIGNIFICANT CHANGE UP (ref 27–34)
MCHC RBC-ENTMCNC: 34.3 GM/DL — SIGNIFICANT CHANGE UP (ref 32–36)
MCV RBC AUTO: 81.2 FL — SIGNIFICANT CHANGE UP (ref 80–100)
NRBC # BLD: 0 /100 WBCS — SIGNIFICANT CHANGE UP
NRBC # FLD: 0 K/UL — SIGNIFICANT CHANGE UP
OSMOLALITY SERPL: 301 MOSM/KG — HIGH (ref 275–295)
OSMOLALITY UR: 370 MOSM/KG — SIGNIFICANT CHANGE UP (ref 50–1200)
PHOSPHATE SERPL-MCNC: 3.4 MG/DL — SIGNIFICANT CHANGE UP (ref 2.5–4.5)
PHOSPHATE SERPL-MCNC: 3.4 MG/DL — SIGNIFICANT CHANGE UP (ref 2.5–4.5)
PLATELET # BLD AUTO: 200 K/UL — SIGNIFICANT CHANGE UP (ref 150–400)
POTASSIUM SERPL-MCNC: 4.4 MMOL/L — SIGNIFICANT CHANGE UP (ref 3.5–5.3)
POTASSIUM SERPL-MCNC: 4.6 MMOL/L — SIGNIFICANT CHANGE UP (ref 3.5–5.3)
POTASSIUM SERPL-SCNC: 4.4 MMOL/L — SIGNIFICANT CHANGE UP (ref 3.5–5.3)
POTASSIUM SERPL-SCNC: 4.6 MMOL/L — SIGNIFICANT CHANGE UP (ref 3.5–5.3)
POTASSIUM UR-SCNC: 20.6 MMOL/L — SIGNIFICANT CHANGE UP
RBC # BLD: 4.52 M/UL — SIGNIFICANT CHANGE UP (ref 3.8–5.2)
RBC # FLD: 12.5 % — SIGNIFICANT CHANGE UP (ref 10.3–14.5)
SODIUM SERPL-SCNC: 139 MMOL/L — SIGNIFICANT CHANGE UP (ref 135–145)
SODIUM SERPL-SCNC: 139 MMOL/L — SIGNIFICANT CHANGE UP (ref 135–145)
SODIUM UR-SCNC: 105 MMOL/L — SIGNIFICANT CHANGE UP
SODIUM UR-SCNC: 97 MMOL/L — SIGNIFICANT CHANGE UP
WBC # BLD: 9.92 K/UL — SIGNIFICANT CHANGE UP (ref 3.8–10.5)
WBC # FLD AUTO: 9.92 K/UL — SIGNIFICANT CHANGE UP (ref 3.8–10.5)

## 2022-05-25 PROCEDURE — 99232 SBSQ HOSP IP/OBS MODERATE 35: CPT | Mod: 25

## 2022-05-25 RX ORDER — ACETAMINOPHEN 500 MG
1000 TABLET ORAL EVERY 6 HOURS
Refills: 0 | Status: DISCONTINUED | OUTPATIENT
Start: 2022-05-25 | End: 2022-05-26

## 2022-05-25 RX ADMIN — Medication 100 MILLIGRAM(S): at 05:43

## 2022-05-25 RX ADMIN — Medication 25 MICROGRAM(S): at 05:43

## 2022-05-25 RX ADMIN — OXYCODONE HYDROCHLORIDE 5 MILLIGRAM(S): 5 TABLET ORAL at 08:45

## 2022-05-25 RX ADMIN — Medication 2 MILLIGRAM(S): at 13:26

## 2022-05-25 RX ADMIN — OXYCODONE HYDROCHLORIDE 5 MILLIGRAM(S): 5 TABLET ORAL at 16:00

## 2022-05-25 RX ADMIN — OXYCODONE HYDROCHLORIDE 5 MILLIGRAM(S): 5 TABLET ORAL at 08:15

## 2022-05-25 RX ADMIN — Medication 2 MILLIGRAM(S): at 21:15

## 2022-05-25 RX ADMIN — Medication 2 MILLIGRAM(S): at 05:43

## 2022-05-25 RX ADMIN — LIDOCAINE 1 PATCH: 4 CREAM TOPICAL at 06:13

## 2022-05-25 RX ADMIN — OXYCODONE HYDROCHLORIDE 5 MILLIGRAM(S): 5 TABLET ORAL at 04:34

## 2022-05-25 RX ADMIN — Medication 400 MILLIGRAM(S): at 00:33

## 2022-05-25 RX ADMIN — LEVETIRACETAM 500 MILLIGRAM(S): 250 TABLET, FILM COATED ORAL at 17:13

## 2022-05-25 RX ADMIN — OXYCODONE HYDROCHLORIDE 5 MILLIGRAM(S): 5 TABLET ORAL at 22:05

## 2022-05-25 RX ADMIN — OXYCODONE HYDROCHLORIDE 5 MILLIGRAM(S): 5 TABLET ORAL at 16:30

## 2022-05-25 RX ADMIN — Medication 400 MILLIGRAM(S): at 05:42

## 2022-05-25 RX ADMIN — Medication 400 MILLIGRAM(S): at 11:04

## 2022-05-25 RX ADMIN — OXYCODONE HYDROCHLORIDE 5 MILLIGRAM(S): 5 TABLET ORAL at 04:04

## 2022-05-25 RX ADMIN — Medication 100 MILLIGRAM(S): at 13:26

## 2022-05-25 RX ADMIN — Medication 1000 MILLIGRAM(S): at 02:10

## 2022-05-25 RX ADMIN — Medication 1000 MILLIGRAM(S): at 23:49

## 2022-05-25 RX ADMIN — LIDOCAINE 1 PATCH: 4 CREAM TOPICAL at 09:20

## 2022-05-25 RX ADMIN — OXYCODONE HYDROCHLORIDE 5 MILLIGRAM(S): 5 TABLET ORAL at 21:15

## 2022-05-25 RX ADMIN — LEVETIRACETAM 500 MILLIGRAM(S): 250 TABLET, FILM COATED ORAL at 05:43

## 2022-05-25 RX ADMIN — PANTOPRAZOLE SODIUM 40 MILLIGRAM(S): 20 TABLET, DELAYED RELEASE ORAL at 05:44

## 2022-05-25 RX ADMIN — Medication 1000 MILLIGRAM(S): at 06:13

## 2022-05-25 NOTE — PROGRESS NOTE ADULT - SUBJECTIVE AND OBJECTIVE BOX
24 HOUR EVENTS:  - Given hydralazine 10mg IVP x2 for hypertension    SUBJECTIVE/ROS:  [ ] A ten-point review of systems was otherwise negative except as noted.  [x] Due to altered mental status/intubation, subjective information were not able to be obtained from the patient. History was obtained, to the extent possible, from review of the chart and collateral sources of information.    NEURO:  GCS:    15  Exam: awake, alert, oriented, 5/5 strength in upper and lower extremities, FOREST serosang, no focal deficits    RESPIRATORY  RR: 15 (05-24-22 @ 22:00) (14 - 19)  SpO2: 95% (05-24-22 @ 22:00) (92% - 98%)  Exam: unlabored    CARDIOVASCULAR  HR: 85 (05-24-22 @ 22:00) (67 - 85)  BP: 145/113 (05-24-22 @ 15:48) (145/113 - 153/96)  ABP: 139/64 (05-24-22 @ 22:00) (139/64 - 172/86)  ABP(mean): 93 (05-24-22 @ 22:00) (93 - 122)    Exam: regular rate and rhythm    Cardiac Rhythm: sinus  Perfusion     [x]Adequate   [ ]Inadequate  Mentation   [x]Normal       [ ]Reduced  Extremities  [x]Warm         [ ]Cool  Volume Status [ ]Hypervolemic [x]Euvolemic [ ]Hypovolemic    GI/NUTRITION  Exam: soft, nontender, nondistended  Diet: reg    GENITOURINARY  I&O's Detail    05-24 @ 07:01  -  05-25 @ 00:04  --------------------------------------------------------  IN:    IV PiggyBack: 150 mL    Lactated Ringers: 240 mL  Total IN: 390 mL    OUT:    Bulb (mL): 15 mL    Indwelling Catheter - Urethral (mL): 475 mL  Total OUT: 490 mL    Total NET: -100 mL        Weight (kg): 103.4 (05-24 @ 06:17)  05-24    137  |  104  |  14  ----------------------------<  175<H>  4.2   |  19<L>  |  0.70    Ca    9.1      24 May 2022 17:03  Phos  3.8     05-24  Mg     1.90     05-24        HEMATOLOGIC                        12.5   6.87  )-----------( 178      ( 24 May 2022 17:03 )             37.6       INFECTIOUS DISEASES  WBC Count: 6.87 K/uL (05-24 @ 17:03)    ACCESS DEVICES:  [x] Peripheral IV  [ ] Central Venous Line	[ ] R	[ ] L	[ ] IJ	[ ] Fem	[ ] SC	Placed:   [x] Arterial Line		[ ] R	[x] L	[ ] Fem	[x] Rad	[ ] Ax	Placed: 5/24  [ ] PICC:					[ ] Mediport  [ ] Urinary Catheter, Date Placed:   [x] Necessity of urinary, arterial, and venous catheters discussed    CODE STATUS: full code   24 HOUR EVENTS:  - Given hydralazine 10mg IVP x2 for hypertension    SUBJECTIVE/ROS:  [ ] A ten-point review of systems was otherwise negative except as noted.  [x] Due to altered mental status/intubation, subjective information were not able to be obtained from the patient. History was obtained, to the extent possible, from review of the chart and collateral sources of information.    NEURO:  GCS:    15  Exam: awake, alert, oriented, 5/5 strength in upper and lower extremities, FOREST serosang, no focal deficits    RESPIRATORY  RR: 15 (05-24-22 @ 22:00) (14 - 19)  SpO2: 95% (05-24-22 @ 22:00) (92% - 98%)  Exam: unlabored    CARDIOVASCULAR  HR: 85 (05-24-22 @ 22:00) (67 - 85)  BP: 145/113 (05-24-22 @ 15:48) (145/113 - 153/96)  ABP: 139/64 (05-24-22 @ 22:00) (139/64 - 172/86)  ABP(mean): 93 (05-24-22 @ 22:00) (93 - 122)    Exam: regular rate and rhythm    Cardiac Rhythm: sinus  Perfusion     [x]Adequate   [ ]Inadequate  Mentation   [x]Normal       [ ]Reduced  Extremities  [x]Warm         [ ]Cool  Volume Status [ ]Hypervolemic [x]Euvolemic [ ]Hypovolemic    GI/NUTRITION  Exam: soft, nontender, nondistended  Diet: reg    GENITOURINARY  I&O's Detail    05-24 @ 07:01  -  05-25 @ 00:04  --------------------------------------------------------  IN:    IV PiggyBack: 150 mL    Lactated Ringers: 240 mL  Total IN: 390 mL    OUT:    Bulb (mL): 15 mL    Indwelling Catheter - Urethral (mL): 475 mL  Total OUT: 490 mL    Total NET: -100 mL        Weight (kg): 103.4 (05-24 @ 06:17)  05-24    137  |  104  |  14  ----------------------------<  175<H>  4.2   |  19<L>  |  0.70    Ca    9.1      24 May 2022 17:03  Phos  3.8     05-24  Mg     1.90     05-24        HEMATOLOGIC                        12.5   6.87  )-----------( 178      ( 24 May 2022 17:03 )             37.6       INFECTIOUS DISEASES  WBC Count: 6.87 K/uL (05-24 @ 17:03)    ACCESS DEVICES:  [x] Peripheral IV  [ ] Central Venous Line	[ ] R	[ ] L	[ ] IJ	[ ] Fem	[ ] SC	Placed:   [x] Arterial Line		[ ] R	[x] L	[ ] Fem	[x] Rad	[ ] Ax	Placed: 5/24  [ ] PICC:					[ ] Mediport  [ ] Urinary Catheter, Date Placed:   [x] Necessity of urinary, arterial, and venous catheters discussed    CODE STATUS: full code

## 2022-05-25 NOTE — CHART NOTE - NSCHARTNOTEFT_GEN_A_CORE
SICU Transfer Note  25 May 2022 16:20    Transfer from: SICU  Transfer to: T420  Accepting team: ALICE, discussed over phone w/ team SICU Transfer Note   25 May 2022 16:20    Patient to be transferred from SICU to NSGY floor (T420). Sign out given to NSGY team over phone.

## 2022-05-25 NOTE — PROGRESS NOTE ADULT - ASSESSMENT
5/25: s/p craniotomy for resection of L. middle cranial fossa meningioma, pod #1, decadron 2q8, SG FOREST drain, keppra

## 2022-05-25 NOTE — PROGRESS NOTE ADULT - ATTENDING COMMENTS
Patient with increase uop over 2L /24hr, with normal Na levels s/p craniotomy. Patient doing well, neurologically intact. Repeat BMP monitor Na, trend uop, continue q1 hr neurochecks, keppra, and resume vte ppx tonight per Nsx.    I have personally interviewed when possible and examined the patient, reviewed data and laboratory tests/x-rays and all pertinent electronic images.  I was physically present for the key portions of the evaluation and management (E/M) service provided.   The SICU team has a constant risk benefit analyzes discussion with the primary team, all consultants, House Staff and PA's on all decisions.  These diagnoses are unrelated to the surgical procedure noted above.  I meet with family if needed to get further history, discuss the case and make care decisions for this patient who might not be able to participate.  Time involved in performance of separately billable procedures was not counted toward my critical care time. There is no overlap.  I spent 30 minutes ( 0800Hrs-0915Hrs in AM/ 1600Hrs-1715Hrs in PM, or other time indicated) of critical care time for the diagnoses, assessment, plan and interventions.  This time excludes time spent on separate procedures and teaching.

## 2022-05-25 NOTE — PHYSICAL THERAPY INITIAL EVALUATION ADULT - PERTINENT HX OF CURRENT PROBLEM, REHAB EVAL
Pt is a 67 year old female presenting for scheduled stereotactic craniotomy for resection of left middle cranial fossa meningioma

## 2022-05-25 NOTE — PROGRESS NOTE ADULT - SUBJECTIVE AND OBJECTIVE BOX
PAST 24HR EVENTS: no issues o/n.  HPI:  68 y/o female with H/O: Hypothyroidism, GERD, Obesity presents for pre op evaluation with h/o persistent headaches lasting for days. S/P MRI on 2020 showed "a spot,. MRI repeated 1 year later on 11/2021 showed that the tumor has grown". Now schedule for stereotactic craniotomy for resection of left middle cranial fossa meningioma  (06 May 2022 11:45)    PHYSICAL EXAM: awake, A&Ox3, fc  perrl, tracts  OLEA 5/5  incision clean, dry dressing  FOREST: 45cc  Vital Signs Last 24 Hrs  T(C): 36.1 (25 May 2022 00:00), Max: 36.7 (24 May 2022 06:17)  T(F): 97 (25 May 2022 00:00), Max: 98.1 (24 May 2022 06:17)  HR: 82 (25 May 2022 04:00) (67 - 85)  BP: 145/113 (24 May 2022 15:48) (145/113 - 153/96)  BP(mean): --  RR: 18 (25 May 2022 04:00) (12 - 19)  SpO2: 95% (25 May 2022 04:00) (92% - 98%)      MEDS:   acetaminophen   IVPB .. 1000 milliGRAM(s) IV Intermittent every 6 hours  ceFAZolin   IVPB 1000 milliGRAM(s) IV Intermittent every 8 hours  dexAMETHasone  Injectable 2 milliGRAM(s) IV Push every 8 hours  levETIRAcetam 500 milliGRAM(s) Oral two times a day  levothyroxine 25 MICROGram(s) Oral daily  lidocaine   4% Patch 1 Patch Transdermal every 24 hours PRN  oxyCODONE    IR 5 milliGRAM(s) Oral every 4 hours PRN  pantoprazole    Tablet 40 milliGRAM(s) Oral before breakfast      LABS:                        12.6   9.92  )-----------( 200      ( 25 May 2022 00:39 )             36.7     05-25    139  |  105  |  12  ----------------------------<  140<H>  4.6   |  20<L>  |  0.66    Ca    9.7      25 May 2022 00:39  Phos  3.4     05-25  Mg     2.10     05-25          RADIOLOGY:     INTERPRETATION:  Clinical indication: Status post surgery.    Multiple axial sections were performed from the base skull to vertex   without contrast enhancement. Coronal and sagittal destructions were   performed as well.    Postoperative changes compatible with a left pterional craniotomy and   mesh like cranioplasty. Previously noted left temporal meningioma. As the   have been removed though this can be further evaluated with contrast   enhanced MRI. There is evidence of extra-axial air with small amount of   acute subdural hematoma seen in the left temporal frontal postoperative   region. This finding measures approximately 0.9 cm widest diameter. Mass   effect on the left lateral ventricle seen. Left-to-right shift (3.8 mm)    Extracalvarial soft tissue swelling staples and drain are identified in   the postoperative region.    Right maxillary polyp versus retention cyst is seen.    Minimal mucosal thickening seen involving both ethmoid right frontal and   left sigmoid sinus    Both mastoid and middle ear regions appear clear.    IMPRESSION: New postop changes as described above.

## 2022-05-25 NOTE — ASU PREOP CHECKLIST - WEIGHT IN KG
Routine Prenatal Visit  [facility] [address] [place] [address]    Assessment/Plan:  Wendi Mayen is a 29y o  year old  at 39w0d who presents for routine prenatal visit  1  Supervision of high-risk pregnancy, third trimester    2  Previous pregnancy Breastfeeding has pump              Subjective:     CC: Prenatal care    Shayy Almaraz is a 29 Viktor Meier MD  [facility]  5/25/2022 6:58 PM 103.4

## 2022-05-25 NOTE — OCCUPATIONAL THERAPY INITIAL EVALUATION ADULT - LEVEL OF INDEPENDENCE, REHAB EVAL
supervision O-Z Plasty Text: The defect edges were debeveled with a #15 scalpel blade.  Given the location of the defect, shape of the defect and the proximity to free margins an O-Z plasty (double transposition flap) was deemed most appropriate.  Using a sterile surgical marker, the appropriate transposition flaps were drawn incorporating the defect and placing the expected incisions within the relaxed skin tension lines where possible.    The area thus outlined was incised deep to adipose tissue with a #15 scalpel blade.  The skin margins were undermined to an appropriate distance in all directions utilizing iris scissors.  Hemostasis was achieved with electrocautery.  The flaps were then transposed into place, one clockwise and the other counterclockwise, and anchored with interrupted buried subcutaneous sutures.

## 2022-05-25 NOTE — PHYSICAL THERAPY INITIAL EVALUATION ADULT - ADDITIONAL COMMENTS
Pt lives in a private home alone; there are a total of 10 steps to enter. Pt reports she was independent with mobility, self-care, and ADLs; no assistive devices utilized. Daughter (who works at [employer] [facility]) plans to stay with daughter for a few days upon discharge.

## 2022-05-25 NOTE — PHYSICAL THERAPY INITIAL EVALUATION ADULT - PATIENT PROFILE REVIEW, REHAB EVAL
PT initial evaluation received and chart review completed. Pt agreeable to participate in PT evaluation. Pt cleared by RN./yes

## 2022-05-25 NOTE — PHYSICAL THERAPY INITIAL EVALUATION ADULT - DISCHARGE DISPOSITION, PT EVAL
Anticipate discharge home without PT services. Pt to remain on PT program in acute care setting to return to prior level of function and address impairments in functional mobility.

## 2022-05-25 NOTE — PHYSICAL THERAPY INITIAL EVALUATION ADULT - DIAGNOSIS, PT EVAL
Upon evaluation, pt presents with impairments in gait and balance. Pt would benefit from skilled PT services in the acute care setting to address impairments to facilitate return to prior level of function.

## 2022-05-25 NOTE — PHYSICAL THERAPY INITIAL EVALUATION ADULT - GENERAL OBSERVATIONS, REHAB EVAL
Upon entry, pt semi-supine in bed in NAD; + FOREST drain, + mejia. Pt left seated in bedside chair with all tubes/lines intact, call bell in reach and in NAD.

## 2022-05-26 ENCOUNTER — TRANSCRIPTION ENCOUNTER (OUTPATIENT)
Age: 67
End: 2022-05-26

## 2022-05-26 VITALS
SYSTOLIC BLOOD PRESSURE: 145 MMHG | OXYGEN SATURATION: 98 % | HEART RATE: 88 BPM | RESPIRATION RATE: 17 BRPM | DIASTOLIC BLOOD PRESSURE: 80 MMHG | TEMPERATURE: 98 F

## 2022-05-26 DIAGNOSIS — Z98.890 OTHER SPECIFIED POSTPROCEDURAL STATES: ICD-10-CM

## 2022-05-26 RX ORDER — OXYCODONE HYDROCHLORIDE 5 MG/1
1 TABLET ORAL
Qty: 0 | Refills: 0 | DISCHARGE
Start: 2022-05-26

## 2022-05-26 RX ORDER — ACETAMINOPHEN 500 MG
2 TABLET ORAL
Qty: 0 | Refills: 0 | DISCHARGE
Start: 2022-05-26

## 2022-05-26 RX ORDER — OXYCODONE HYDROCHLORIDE 5 MG/1
1 TABLET ORAL
Qty: 30 | Refills: 0
Start: 2022-05-26 | End: 2022-05-30

## 2022-05-26 RX ORDER — LEVETIRACETAM 250 MG/1
1 TABLET, FILM COATED ORAL
Qty: 14 | Refills: 0
Start: 2022-05-26 | End: 2022-06-01

## 2022-05-26 RX ORDER — LEVETIRACETAM 250 MG/1
1 TABLET, FILM COATED ORAL
Qty: 0 | Refills: 0 | DISCHARGE
Start: 2022-05-26

## 2022-05-26 RX ORDER — DEXAMETHASONE 0.5 MG/5ML
1 ELIXIR ORAL
Qty: 9 | Refills: 0
Start: 2022-05-26 | End: 2022-05-30

## 2022-05-26 RX ORDER — ASPIRIN/CALCIUM CARB/MAGNESIUM 324 MG
2 TABLET ORAL
Qty: 0 | Refills: 0 | DISCHARGE

## 2022-05-26 RX ADMIN — OXYCODONE HYDROCHLORIDE 5 MILLIGRAM(S): 5 TABLET ORAL at 06:00

## 2022-05-26 RX ADMIN — LEVETIRACETAM 500 MILLIGRAM(S): 250 TABLET, FILM COATED ORAL at 17:35

## 2022-05-26 RX ADMIN — OXYCODONE HYDROCHLORIDE 5 MILLIGRAM(S): 5 TABLET ORAL at 14:59

## 2022-05-26 RX ADMIN — LIDOCAINE 1 PATCH: 4 CREAM TOPICAL at 11:50

## 2022-05-26 RX ADMIN — OXYCODONE HYDROCHLORIDE 5 MILLIGRAM(S): 5 TABLET ORAL at 09:31

## 2022-05-26 RX ADMIN — PANTOPRAZOLE SODIUM 40 MILLIGRAM(S): 20 TABLET, DELAYED RELEASE ORAL at 05:16

## 2022-05-26 RX ADMIN — OXYCODONE HYDROCHLORIDE 5 MILLIGRAM(S): 5 TABLET ORAL at 05:16

## 2022-05-26 RX ADMIN — Medication 1000 MILLIGRAM(S): at 00:45

## 2022-05-26 RX ADMIN — OXYCODONE HYDROCHLORIDE 5 MILLIGRAM(S): 5 TABLET ORAL at 14:35

## 2022-05-26 RX ADMIN — OXYCODONE HYDROCHLORIDE 5 MILLIGRAM(S): 5 TABLET ORAL at 10:05

## 2022-05-26 RX ADMIN — Medication 25 MICROGRAM(S): at 05:16

## 2022-05-26 RX ADMIN — Medication 2 MILLIGRAM(S): at 05:17

## 2022-05-26 RX ADMIN — LEVETIRACETAM 500 MILLIGRAM(S): 250 TABLET, FILM COATED ORAL at 05:25

## 2022-05-26 NOTE — PROGRESS NOTE ADULT - PROBLEM SELECTOR PLAN 1
- q1h neurochecks  - monitor drain output  - c/w decadron  - c/w keppra    d/w attending
Continue synthroid

## 2022-05-26 NOTE — DISCHARGE NOTE PROVIDER - NSDCMRMEDTOKEN_GEN_ALL_CORE_FT
acetaminophen 500 mg oral tablet: 2 tab(s) orally every 6 hours, As needed, Temp greater or equal to 38C (100.4F), Mild Pain (1 - 3)  acyclovir 400 mg oral tablet: 1 tab(s) orally 2 times a day, As Needed  airbone: one tab orally once a day  butalb/acetamin/caff 50/325/40 m tab(s) orally once a day (at bedtime), As Needed  colestapole hydrochloride: 1 gram(s) orally twice  a day  levETIRAcetam 500 mg oral tablet: 1 tab(s) orally 2 times a day  levothyroxine 25 mcg (0.025 mg) oral tablet: 1 tab(s) orally once a day  Multi Vitamin+: one tab orally once a day; last dose   omeprazole 40 mg oral delayed release capsule: 1 cap(s) orally once a day  oxyCODONE 5 mg oral tablet: 1 tab(s) orally every 4 hours, As needed, Moderate Pain (4 - 6)  phentermine 37.5 mg oral tablet: 1 tab(s) orally once a day  Refresh ophthalmic solution: 1 drop(s) to each affected eye 4 times a day  tiZANidine 2 mg oral tablet: one tab orally as needed  Vitamin B12: TWO TAB orally once a day  Vitamin D3: one tab orally once a day   acetaminophen 500 mg oral tablet: 2 tab(s) orally every 6 hours, As needed, Temp greater or equal to 38C (100.4F), Mild Pain (1 - 3)  acyclovir 400 mg oral tablet: 1 tab(s) orally 2 times a day, As Needed  airbone: one tab orally once a day  butalb/acetamin/caff 50/325/40 m tab(s) orally once a day (at bedtime), As Needed  colestapole hydrochloride: 1 gram(s) orally twice  a day  dexamethasone 2 mg oral tablet: 1 tab(s) orally every 8 hours x 1 day,  1 tab every 12 hours x 2 days, 1 tab daily x 2 days then stop  levETIRAcetam 500 mg oral tablet: 1 tab(s) orally 2 times a day  levothyroxine 25 mcg (0.025 mg) oral tablet: 1 tab(s) orally once a day  Multi Vitamin+: one tab orally once a day; last dose   omeprazole 40 mg oral delayed release capsule: 1 cap(s) orally once a day  oxyCODONE 5 mg oral tablet: 1 tab(s) orally every 4 hours, As needed, Moderate Pain (4 - 6) MDD:6 tabs  phentermine 37.5 mg oral tablet: 1 tab(s) orally once a day  Refresh ophthalmic solution: 1 drop(s) to each affected eye 4 times a day  tiZANidine 2 mg oral tablet: one tab orally as needed  Vitamin B12: TWO TAB orally once a day  Vitamin D3: one tab orally once a day

## 2022-05-26 NOTE — DISCHARGE NOTE PROVIDER - CARE PROVIDER_API CALL
Ruddy Marvin)  Neurosurgery; Pediatric Neurosurgery  [address] [place] [address], [address]  [place], [place] [id number]  Phone: [phone]  Fax: [phone]  Follow Up Time: 1 week

## 2022-05-26 NOTE — PROVIDER CONTACT NOTE (OTHER) - BACKGROUND
Pt s/p L. middle cranial fossa meningioma 5/24, pt was in SICU. Received pt on 2L NC. Pt uses CPAP at home at night, using 2L NC now

## 2022-05-26 NOTE — PROGRESS NOTE ADULT - PROBLEM SELECTOR PLAN 2
Neurologic checks Q4H  Monitor drain output  Continue keppra  Discuss decadron taper with attending  Discharge planning

## 2022-05-26 NOTE — DISCHARGE NOTE NURSING/CASE MANAGEMENT/SOCIAL WORK - PATIENT PORTAL LINK FT
You can access the FollowMyHealth Patient Portal offered by [facility] by registering at the following website: [email or web address]. By joining "[employer]’s FollowMyHealth portal, you will also be able to view your health information using other applications (apps) compatible with our system.

## 2022-05-26 NOTE — DISCHARGE NOTE NURSING/CASE MANAGEMENT/SOCIAL WORK - NSDCPEFALRISK_GEN_ALL_CORE
For information on Fall & Injury Prevention, visit: [email or web address] OR  [email or web address] OR  https://www.cdc.gov/steadi/patient.html

## 2022-05-26 NOTE — DISCHARGE NOTE PROVIDER - NSDCFUADDINST_GEN_ALL_CORE_FT
- Remove top surgical dressing on post operative day 3 unless it was removed by the surgical team prior to your discharge. Incision should be left uncovered after day 3.     - Begin showering with shampoo on post operative day 4. Avoid long soaks and do not submerge incision in bathtub. Regular shower only and allow soap and water to run over the incision. Pat incision area dry with clean towel- do not scrub. Please shower regularly to ensure incision stays clean to avoid post operative infections. Most incisions are closed with absorbable/dissolvable sutures. Some incisions have "steristrips" placed over the incisions which are small bandages that will fall off on their own. If the incision is closed with metallic staples- they will need to be removed anywhere from 7-14 days after surgery by your surgeon.     -  Notify your surgeon if you notice increased redness, drainage or you notice incision area opening.     - Return to ER immediately for high fevers, severe headache, vomiting, lethargy or  weakness    - Please call your neurosurgeon following discharge to make follow up appointment in 1-2 weeks after discharge unless otherwise specified. See Contact information below.     - Prescription Post operative medication, if applicable,  are sent to [facility](unless another pharmacy specified)- [facility] is located in [facility] [employer]. All post operative prescrptions should be picked up before departing the hospital especially for pediatric patients who require liquid medications as local retail pharmacies may not have liquid formulations in stock.    -If you are taking narcotic pain medication, please obtain stool softeners from the pharmacy and take regularly to prevent constipation    - Ambulate as tolerate. Continue with all "activities of daily living". Avoid strenuous activity or lifting more than 10 pounds until cleared for additional activity at your follow up appointment    - Do not return to work or school until cleared by your neurosurgeon at your follow up visit unless specified to you during your hospital stay

## 2022-05-26 NOTE — DISCHARGE NOTE NURSING/CASE MANAGEMENT/SOCIAL WORK - NSDCPNINST_GEN_ALL_CORE
Please return to ED if you develop any nausea, vomiting, diarrhea or temp of 100.4 and greater. Notify the provider if you develop any pus like drainage from incision sites, or increased level of pain unrelieved with ordered pain meds.

## 2022-05-26 NOTE — DISCHARGE NOTE PROVIDER - NSDCCPCAREPLAN_GEN_ALL_CORE_FT
PRINCIPAL DISCHARGE DIAGNOSIS  Diagnosis: S/P resection of meningioma  Assessment and Plan of Treatment: - Remove top surgical dressing on post operative day 3 unless it was removed by the surgical team prior to your discharge. Incision should be left uncovered after day 3.   - Begin showering with shampoo on post operative day 4. Avoid long soaks and do not submerge incision in bathtub. Regular shower only and allow soap and water to run over the incision. Pat incision area dry with clean towel- do not scrub. Please shower regularly to ensure incision stays clean to avoid post operative infections. Most incisions are closed with absorbable/dissolvable sutures. Some incisions have "steristrips" placed over the incisions which are small bandages that will fall off on their own. If the incision is closed with metallic staples- they will need to be removed anywhere from 7-14 days after surgery by your surgeon.   -  Notify your surgeon if you notice increased redness, drainage or you notice incision area opening.   - Return to ER immediately for high fevers, severe headache, vomiting, lethargy or  weakness  - Please call your neurosurgeon following discharge to make follow up appointment in 1-2 weeks after discharge unless otherwise specified. See Contact information below.   - Prescription Post operative medication, if applicable,  are sent to [facility](unless another pharmacy specified)- [facility] is located in [facility] [place]. All post operative prescrptions should be picked up before departing the hospital especially for pediatric patients who require liquid medications as local retail pharmacies may not have liquid formulations in stock.  -If you are taking narcotic pain medication, please obtain stool softeners from the pharmacy and take regularly to prevent constipation  - Ambulate as tolerate. Continue with all "activities of daily living". Avoid strenuous activity or lifting more than 10 pounds until cleared for additional activity at your follow up appointment  - Do not return to work or school until cleared by your neurosurgeon at your

## 2022-05-26 NOTE — DISCHARGE NOTE PROVIDER - HOSPITAL COURSE
68 y/o female with H/O: Hypothyroidism, GERD, Obesity presents for pre op evaluation with h/o persistent headaches lasting for days. S/P MRI on 2020 showed "a spot,. MRI repeated 1 year later on 11/2021 showed that the tumor has grown". Now schedule for stereotactic craniotomy for resection of left middle cranial fossa meningioma   on 5/25 5/25: s/p craniotomy for resection of L. middle cranial fossa meningioma, pod #1, decadron 2q8, SG FOREST drain, keppra  5/26: POD # 2 S/P Left craniotomy, resection of middle cranial fossa meningioma. Decadron 2mg Q8H, Keppra 500mg BID, FOREST in place. Evaluated by PT, no outpatient needs and OT, outpatient OT follow up  5/26 FOREST drain removed 66 y/o female with H/O: Hypothyroidism, GERD, Obesity presents for pre op evaluation with h/o persistent headaches lasting for days. S/P MRI on 2020 showed "a spot,. MRI repeated 1 year later on 11/2021 showed that the tumor has grown". Now schedule for stereotactic craniotomy for resection of left middle cranial fossa meningioma   on 5/25 5/25: s/p craniotomy for resection of L. middle cranial fossa meningioma, pod #1, decadron 2q8, SG FOREST drain, keppra  5/26: POD # 2 S/P Left craniotomy, resection of middle cranial fossa meningioma. Decadron 2mg Q8H, Keppra 500mg BID, FOREST in place. Evaluated by PT, no outpatient needs and OT, outpatient OT follow up  5/26 FOREST drain removed, stable for DC home

## 2022-05-26 NOTE — PROGRESS NOTE ADULT - ASSESSMENT
HPI:  68 y/o female with H/O: Hypothyroidism, GERD, Obesity presents for pre op evaluation with h/o persistent headaches lasting for days. S/P MRI on 2020 showed "a spot,. MRI repeated 1 year later on 11/2021 showed that the tumor has grown". Now schedule for stereotactic craniotomy for resection of left middle cranial fossa meningioma  (06 May 2022 11:45)    5/25: s/p craniotomy for resection of L. middle cranial fossa meningioma, pod #1, decadron 2q8, SG FOREST drain, keppra  5/26: POD # 2 S/P Left craniotomy, resection of middle cranial fossa meningioma. Decadron 2mg Q8H, Keppra 500mg BID, FOREST in place. Evaluated by PT, no outpatient needs and OT, outpatient OT follow up

## 2022-05-26 NOTE — PROGRESS NOTE ADULT - SUBJECTIVE AND OBJECTIVE BOX
No issues overnight  PT and OT initial evaluations completed    Vital Signs Last 24 Hrs  T(C): 37.4 (26 May 2022 01:37), Max: 37.4 (26 May 2022 01:37)  T(F): 99.3 (26 May 2022 01:37), Max: 99.3 (26 May 2022 01:37)  HR: 74 (26 May 2022 01:37) (72 - 107)  BP: 125/64 (26 May 2022 01:37) (125/64 - 156/92)  BP(mean): 103 (25 May 2022 17:00) (89 - 111)  RR: 18 (26 May 2022 01:37) (12 - 23)  SpO2: 94% (26 May 2022 01:37) (94% - 100%)    AAO X 3  PERRLA, EOMI  CN 2-12 grossly intact  OLEA strength 5/5  No drift  SILT    FOREST: 28cc    MEDICATIONS  (STANDING):  dexAMETHasone  Injectable 2 milliGRAM(s) IV Push every 8 hours  levETIRAcetam 500 milliGRAM(s) Oral two times a day  levothyroxine 25 MICROGram(s) Oral daily  pantoprazole    Tablet 40 milliGRAM(s) Oral before breakfast    MEDICATIONS  (PRN):  acetaminophen     Tablet .. 1000 milliGRAM(s) Oral every 6 hours PRN Temp greater or equal to 38C (100.4F), Mild Pain (1 - 3)  lidocaine   4% Patch 1 Patch Transdermal every 24 hours PRN back pain  oxyCODONE    IR 5 milliGRAM(s) Oral every 4 hours PRN Moderate Pain (4 - 6)                          12.6   9.92  )-----------( 200      ( 25 May 2022 00:39 )             36.7       05-25    139  |  105  |  15  ----------------------------<  139<H>  4.4   |  23  |  0.65    Ca    9.7      25 May 2022 12:30  Phos  3.4     05-25  Mg     2.10     05-25    < from: CT Head No Cont in OR (05.24.22 @ 16:02) >  INTERPRETATION:  Clinical indication: Status post surgery.    Multiple axial sections were performed from the base skull to vertex   without contrast enhancement. Coronal and sagittal destructions were   performed as well.    Postoperative changes compatible with a left pterional craniotomy and   mesh like cranioplasty. Previously noted left temporal meningioma. As the   have been removed though this can be further evaluated with contrast   enhanced MRI. There is evidence of extra-axial air with small amount of   acute subdural hematoma seen in the left temporal frontal postoperative   region. This finding measures approximately 0.9 cm widest diameter. Mass   effect on the left lateral ventricle seen. Left-to-right shift (3.8 mm)    Extracalvarial soft tissue swelling staples and drain are identified in   the postoperative region.    Right maxillary polyp versus retention cyst is seen.    Minimal mucosal thickening seen involving both ethmoid right frontal and   left sigmoid sinus    Both mastoid and middle ear regions appear clear.    IMPRESSION: New postop changes as described above.    < end of copied text >

## 2022-05-26 NOTE — DISCHARGE NOTE PROVIDER - NSDCHC_MEDRECSTATUS_GEN_ALL_CORE
Ventricular Rate : 89   Atrial Rate : 89   P-R Interval : 146   QRS Duration : 84   Q-T Interval : 340   QTC Calculation(Bezet) : 413   P Axis : 66   R Axis : 39   T Axis : 25   Diagnosis : Normal sinus rhythm~Normal ECG~No previous ECGs available~Confirmed by JUSTUS CHAU, [facility], Darrell PINA ([id number]) on 12/20/2017 8:02:26 AM      Admission Reconciliation is Completed  Discharge Reconciliation is Not Complete Admission Reconciliation is Completed  Discharge Reconciliation is Completed

## 2022-06-01 LAB — SURGICAL PATHOLOGY STUDY: SIGNIFICANT CHANGE UP

## 2022-06-27 ENCOUNTER — APPOINTMENT (OUTPATIENT)
Dept: MRI IMAGING | Facility: CLINIC | Age: 67
End: 2022-06-27
Payer: MEDICARE

## 2022-06-27 ENCOUNTER — OUTPATIENT (OUTPATIENT)
Dept: OUTPATIENT SERVICES | Facility: HOSPITAL | Age: 67
LOS: 1 days | End: 2022-06-27
Payer: MEDICARE

## 2022-06-27 DIAGNOSIS — Z96.652 PRESENCE OF LEFT ARTIFICIAL KNEE JOINT: Chronic | ICD-10-CM

## 2022-06-27 DIAGNOSIS — Z98.891 HISTORY OF UTERINE SCAR FROM PREVIOUS SURGERY: Chronic | ICD-10-CM

## 2022-06-27 DIAGNOSIS — Z87.19 PERSONAL HISTORY OF OTHER DISEASES OF THE DIGESTIVE SYSTEM: Chronic | ICD-10-CM

## 2022-06-27 DIAGNOSIS — Z87.39 PERSONAL HISTORY OF OTHER DISEASES OF THE MUSCULOSKELETAL SYSTEM AND CONNECTIVE TISSUE: Chronic | ICD-10-CM

## 2022-06-27 DIAGNOSIS — Z98.890 OTHER SPECIFIED POSTPROCEDURAL STATES: Chronic | ICD-10-CM

## 2022-06-27 DIAGNOSIS — Z98.84 BARIATRIC SURGERY STATUS: Chronic | ICD-10-CM

## 2022-06-27 DIAGNOSIS — Z90.49 ACQUIRED ABSENCE OF OTHER SPECIFIED PARTS OF DIGESTIVE TRACT: Chronic | ICD-10-CM

## 2022-06-27 DIAGNOSIS — D32.9 BENIGN NEOPLASM OF MENINGES, UNSPECIFIED: ICD-10-CM

## 2022-06-27 PROCEDURE — 70553 MRI BRAIN STEM W/O & W/DYE: CPT | Mod: 26,MH

## 2022-06-27 PROCEDURE — 70553 MRI BRAIN STEM W/O & W/DYE: CPT | Mod: MH

## 2022-06-27 PROCEDURE — A9585: CPT

## 2022-07-17 ENCOUNTER — NON-APPOINTMENT (OUTPATIENT)
Age: 67
End: 2022-07-17

## 2022-07-20 NOTE — PATIENT PROFILE ADULT. - FAMILY HISTORY
Administrative documentation to document COVID-19 status.  COVID-19 status: Most recent Covid-19 test positive    Onset of Symptom Date:  07/15/2022   Father  Still living? No  Family history of diabetes mellitus, Age at diagnosis: Age Unknown  Family history of Alzheimer's disease, Age at diagnosis: Age Unknown     Mother  Still living? No  Family history of hypertension, Age at diagnosis: Age Unknown

## 2022-08-01 ENCOUNTER — APPOINTMENT (OUTPATIENT)
Dept: ORTHOPEDIC SURGERY | Facility: CLINIC | Age: 67
End: 2022-08-01

## 2022-08-05 ENCOUNTER — APPOINTMENT (OUTPATIENT)
Dept: CT IMAGING | Facility: CLINIC | Age: 67
End: 2022-08-05

## 2022-08-05 ENCOUNTER — OUTPATIENT (OUTPATIENT)
Dept: OUTPATIENT SERVICES | Facility: HOSPITAL | Age: 67
LOS: 1 days | End: 2022-08-05
Payer: MEDICARE

## 2022-08-05 DIAGNOSIS — Z98.890 OTHER SPECIFIED POSTPROCEDURAL STATES: Chronic | ICD-10-CM

## 2022-08-05 DIAGNOSIS — Z96.652 PRESENCE OF LEFT ARTIFICIAL KNEE JOINT: Chronic | ICD-10-CM

## 2022-08-05 DIAGNOSIS — Z87.19 PERSONAL HISTORY OF OTHER DISEASES OF THE DIGESTIVE SYSTEM: Chronic | ICD-10-CM

## 2022-08-05 DIAGNOSIS — Z98.891 HISTORY OF UTERINE SCAR FROM PREVIOUS SURGERY: Chronic | ICD-10-CM

## 2022-08-05 DIAGNOSIS — Z90.49 ACQUIRED ABSENCE OF OTHER SPECIFIED PARTS OF DIGESTIVE TRACT: Chronic | ICD-10-CM

## 2022-08-05 DIAGNOSIS — K57.92 DIVERTICULITIS OF INTESTINE, PART UNSPECIFIED, WITHOUT PERFORATION OR ABSCESS WITHOUT BLEEDING: ICD-10-CM

## 2022-08-05 DIAGNOSIS — Z87.39 PERSONAL HISTORY OF OTHER DISEASES OF THE MUSCULOSKELETAL SYSTEM AND CONNECTIVE TISSUE: Chronic | ICD-10-CM

## 2022-08-05 DIAGNOSIS — Z98.84 BARIATRIC SURGERY STATUS: Chronic | ICD-10-CM

## 2022-08-05 PROCEDURE — 74176 CT ABD & PELVIS W/O CONTRAST: CPT | Mod: MH

## 2022-08-05 PROCEDURE — 74176 CT ABD & PELVIS W/O CONTRAST: CPT | Mod: 26,MH

## 2022-08-16 ENCOUNTER — APPOINTMENT (OUTPATIENT)
Dept: RADIATION ONCOLOGY | Facility: CLINIC | Age: 67
End: 2022-08-16

## 2022-08-16 VITALS
SYSTOLIC BLOOD PRESSURE: 136 MMHG | WEIGHT: 239.86 LBS | HEIGHT: 63 IN | DIASTOLIC BLOOD PRESSURE: 82 MMHG | RESPIRATION RATE: 18 BRPM | TEMPERATURE: 96.98 F | OXYGEN SATURATION: 95 % | HEART RATE: 83 BPM | BODY MASS INDEX: 42.5 KG/M2

## 2022-08-16 DIAGNOSIS — Z78.9 OTHER SPECIFIED HEALTH STATUS: ICD-10-CM

## 2022-08-16 PROCEDURE — 99205 OFFICE O/P NEW HI 60 MIN: CPT | Mod: 25

## 2022-08-16 RX ORDER — TIZANIDINE 2 MG/1
2 TABLET ORAL
Refills: 0 | Status: ACTIVE | COMMUNITY
Start: 2022-08-16

## 2022-08-16 RX ORDER — BUTALBITAL, ACETAMINOPHEN AND CAFFEINE 325; 50; 40 MG/1; MG/1; MG/1
50-325-40 TABLET ORAL
Qty: 30 | Refills: 2 | Status: DISCONTINUED | COMMUNITY
Start: 2022-01-03 | End: 2022-08-16

## 2022-08-16 RX ORDER — BROMELAINS 1200/G
POWDER (GRAM) MISCELLANEOUS
Refills: 0 | Status: ACTIVE | COMMUNITY
Start: 2022-08-16

## 2022-08-16 RX ORDER — BISMUTH SUBSALICYLATE 525 MG/1
TABLET ORAL
Refills: 0 | Status: ACTIVE | COMMUNITY
Start: 2022-08-16

## 2022-08-16 RX ORDER — TURMERIC 400 MG
400 CAPSULE ORAL
Refills: 0 | Status: ACTIVE | COMMUNITY
Start: 2022-08-16

## 2022-08-16 RX ORDER — MULTIVIT-MIN/VIT C/HERB NO.124 250-11.66
TABLET,CHEWABLE ORAL
Refills: 0 | Status: ACTIVE | COMMUNITY
Start: 2022-08-16

## 2022-08-16 NOTE — VITALS
[Date: ____________] : Patient's last distress assessment performed on [unfilled]. [Least Pain Intensity: 0/10] : 0/10 [ECOG Performance Status: 1 - Restricted in physically strenuous activity but ambulatory and able to carry out work of a light or sedentary nature] : Performance Status: 1 - Restricted in physically strenuous activity but ambulatory and able to carry out work of a light or sedentary nature, e.g., light house work, office work [Maximal Pain Intensity: 4/10] : 4/10

## 2022-08-16 NOTE — REVIEW OF SYSTEMS
[Loss of Hearing] : loss of hearing [Confused] : confusion [Dizziness] : dizziness [Difficulty Walking] : difficulty walking [Negative] : Genitourinary [Fever] : no fever [Chills] : no chills [Night Sweats] : no night sweats [Dysphagia] : no dysphagia [Fainting] : no fainting [FreeTextEntry2] : headache of left forehead [FreeTextEntry3] : vision blurry, both eyes, pending cararact surgery [FreeTextEntry4] : loss of hearing and tinnitus recently [FreeTextEntry9] : left arm and leg feel weaker than right last 5 years [de-identified] : loses balance easily for the last 5 years. pain to left forehead sometimes. forgetful and easily confused [de-identified] : moods  ok

## 2022-08-16 NOTE — REASON FOR VISIT
[Consideration of Curative Therapy] : consideration of curative therapy for [Family Member] : family member [Brain Tumor] : brain tumor

## 2022-08-16 NOTE — PHYSICAL EXAM
[Extraocular Movements] : extraocular movements were intact [] : no respiratory distress [Respiration, Rhythm And Depth] : normal respiratory rhythm and effort [Normal] : oriented to person, place and time, the affect was normal, the mood was normal and not anxious [de-identified] : decreased vision due to cataracts

## 2022-08-16 NOTE — DISEASE MANAGEMENT
[Pathological] : TNM Stage: p [N/A] : Currently not applicable [FreeTextEntry4] : WHO grade II, atypical meningioma [TTNM] : x [NTNM] : x [MTNM] : x

## 2022-08-16 NOTE — HISTORY OF PRESENT ILLNESS
[FreeTextEntry1] : Ms Blankenship is a 67 yrs old female with left middle cranial fossa meningioma, s/p left craniotomy, resection of middle cranial fossa meningioma. With hx of anal cancer, s/p chemo/XRT that was completed 5/2015, had a CR. \par \par Presented in 11/2020 to Dr. Sahu (Neurologist) onset of headaches that had lasted few weeks and not responding to conventional over-the-counter medications.\par \par 11/25/2020- MRI Brain revealed small left middle cranial fossa meningioma. \par \par In follow up visit in 12/2020 with Dr. Sahu she reported resolution of headaches plan to follow up in 6 months.\par \par 1/3/2022- She reported increase headache to Dr. Sahu and scan were ordered. and she was started on Fioricet and Imitrex\par \par 1/19/2022- MRI Brain noted mild interval increase in size of the extra-axial mass/meningioma along the anterior aspect of the left middle cranial fossa, measuring 2.7 x 2.5 x 1.4 cm, previously measured 2.1 x 2.0 x 0.8 cm. \par \par 5/24/2022- she underwent anterior and middle cranial fossa skull base orbital craniotomy for excision of skull base neoplasm, by Dr. Marvin. Pathology revealed atypical meningioma, WHO grade 2 positive for EDWAR and FL. Ki-67  moderately elevated (8 -12%). pHH3 immunostain shows the mitotic activity is increased (up to 5-7 mitoses/10 HPF). \par \par 8/15/2022- Presents today in initial consultation for consideration of radiation therapy. Patient states that she continues to have headaches; however, they are less frequent and tolerable without medication. She has had trouble with focusing and confusion for the last 5 years. it did not improve after surgery . over the last 5  years she notes she easily loses her balance. her left arm and leg area weaker than the right.  She has a shooting pain of the left forehead which has been present since 2019. this did not improve after surgery. Her hearing is worse after surgery. has blurry vision bilaterally, but attributes this to cataracts.

## 2022-08-18 ENCOUNTER — RESULT REVIEW (OUTPATIENT)
Age: 67
End: 2022-08-18

## 2022-08-18 ENCOUNTER — OUTPATIENT (OUTPATIENT)
Dept: OUTPATIENT SERVICES | Facility: HOSPITAL | Age: 67
LOS: 1 days | Discharge: HOME | End: 2022-08-18

## 2022-08-18 DIAGNOSIS — Z90.49 ACQUIRED ABSENCE OF OTHER SPECIFIED PARTS OF DIGESTIVE TRACT: Chronic | ICD-10-CM

## 2022-08-18 DIAGNOSIS — Z87.19 PERSONAL HISTORY OF OTHER DISEASES OF THE DIGESTIVE SYSTEM: Chronic | ICD-10-CM

## 2022-08-18 DIAGNOSIS — Z98.890 OTHER SPECIFIED POSTPROCEDURAL STATES: Chronic | ICD-10-CM

## 2022-08-18 DIAGNOSIS — Z98.84 BARIATRIC SURGERY STATUS: Chronic | ICD-10-CM

## 2022-08-18 DIAGNOSIS — Z87.39 PERSONAL HISTORY OF OTHER DISEASES OF THE MUSCULOSKELETAL SYSTEM AND CONNECTIVE TISSUE: Chronic | ICD-10-CM

## 2022-08-18 DIAGNOSIS — Z96.652 PRESENCE OF LEFT ARTIFICIAL KNEE JOINT: Chronic | ICD-10-CM

## 2022-08-18 DIAGNOSIS — Z98.891 HISTORY OF UTERINE SCAR FROM PREVIOUS SURGERY: Chronic | ICD-10-CM

## 2022-08-28 ENCOUNTER — OUTPATIENT (OUTPATIENT)
Dept: OUTPATIENT SERVICES | Facility: HOSPITAL | Age: 67
LOS: 1 days | End: 2022-08-28
Payer: MEDICARE

## 2022-08-28 ENCOUNTER — APPOINTMENT (OUTPATIENT)
Dept: MRI IMAGING | Facility: CLINIC | Age: 67
End: 2022-08-28

## 2022-08-28 DIAGNOSIS — Z98.890 OTHER SPECIFIED POSTPROCEDURAL STATES: Chronic | ICD-10-CM

## 2022-08-28 DIAGNOSIS — Z98.84 BARIATRIC SURGERY STATUS: Chronic | ICD-10-CM

## 2022-08-28 DIAGNOSIS — Z96.652 PRESENCE OF LEFT ARTIFICIAL KNEE JOINT: Chronic | ICD-10-CM

## 2022-08-28 DIAGNOSIS — D32.9 BENIGN NEOPLASM OF MENINGES, UNSPECIFIED: ICD-10-CM

## 2022-08-28 DIAGNOSIS — Z98.891 HISTORY OF UTERINE SCAR FROM PREVIOUS SURGERY: Chronic | ICD-10-CM

## 2022-08-28 DIAGNOSIS — D42.0 NEOPLASM OF UNCERTAIN BEHAVIOR OF CEREBRAL MENINGES: ICD-10-CM

## 2022-08-28 DIAGNOSIS — Z87.19 PERSONAL HISTORY OF OTHER DISEASES OF THE DIGESTIVE SYSTEM: Chronic | ICD-10-CM

## 2022-08-28 DIAGNOSIS — Z98.890 OTHER SPECIFIED POSTPROCEDURAL STATES: ICD-10-CM

## 2022-08-28 DIAGNOSIS — Z87.39 PERSONAL HISTORY OF OTHER DISEASES OF THE MUSCULOSKELETAL SYSTEM AND CONNECTIVE TISSUE: Chronic | ICD-10-CM

## 2022-08-28 DIAGNOSIS — Z90.49 ACQUIRED ABSENCE OF OTHER SPECIFIED PARTS OF DIGESTIVE TRACT: Chronic | ICD-10-CM

## 2022-08-28 PROCEDURE — 70553 MRI BRAIN STEM W/O & W/DYE: CPT | Mod: 26,MH

## 2022-08-28 PROCEDURE — A9585: CPT

## 2022-08-28 PROCEDURE — 70553 MRI BRAIN STEM W/O & W/DYE: CPT

## 2022-09-02 ENCOUNTER — NON-APPOINTMENT (OUTPATIENT)
Age: 67
End: 2022-09-02

## 2022-09-02 ENCOUNTER — APPOINTMENT (OUTPATIENT)
Dept: ORTHOPEDIC SURGERY | Facility: CLINIC | Age: 67
End: 2022-09-02

## 2022-09-02 PROCEDURE — 73562 X-RAY EXAM OF KNEE 3: CPT | Mod: 50

## 2022-09-02 PROCEDURE — 99213 OFFICE O/P EST LOW 20 MIN: CPT

## 2022-09-02 NOTE — HISTORY OF PRESENT ILLNESS
[de-identified] : Patient presents today for evaluation of bilateral knee pain.  The patient is status post left total knee arthroplasty May 21,  2018.  She continues to have an anterior knee discomfort.  It is worse when she goes to stand.  She reports that it is a sharp pain.  She denies of any posterior knee pain.  She states the right knee pain she has had in the past has improved somewhat.  She did have a small traumatic injury where she hit into a toilet bowl seat.  The pain was severe over the course of the summer but now subsequently improved.  The patient reports past physical therapy has improved her knee pain.  She denies any associated hip stiffness or discomfort.  She does have chronic lower back discomfort.  Since she was last seen, she was diagnosed with atypical meningioma.  She has had surgery to remove the meningioma.  She will be undergoing brain radiation in the next 2 weeks.\par \par Review of Systems-\par Constitutional: No fever or chills. \par Cardiovascular: No orthopnea or chest pain\par Pulmonary: No shortness of breath. \par GI: No nausea or vomiting or abdominal pain.\par Musculoskeletal: see HPI \par Psychiatric: No anxiety and depression.

## 2022-09-02 NOTE — DISCUSSION/SUMMARY
[de-identified] : 25 minutes was spent reviewing the x-rays as well as discussing with the patient their clinical presentation, diagnosis and providing education.  The patient reports with past physical therapy her left knee pain is improved.  The patient is recommended a course of physical therapy.  Because the right knee is not bothering her significant at this time we will continue to follow.  She is not interested in proceeding with surgical management.  If she should need a corticosteroid injection she will contact the office.  She will otherwise be seen back as needed.

## 2022-09-02 NOTE — END OF VISIT
[FreeTextEntry3] : I, Dr. Harrington, personally performed the evaluation and management services for this established patient who presented today with a new problem/exacerbation of an existing condition. That E/M includes conducting the examination, assessing all new/exacerbated conditions, and establishing a new plan of care. Today, MY ACP was here to observe my evaluation and management services of this new problem/exacerbated condition to be followed going forward.\par

## 2022-09-02 NOTE — PHYSICAL EXAM
[de-identified] : The patient appears well nourished and in no apparent distress. The patient is alert and oriented to person, place, and time. Affect and mood appear normal. The head is normocephalic and atraumatic. Skin shows normal turgor with no evidence of eczema or psoriasis. No respiratory distress noted. Sensation grossly intact. MUSCULOSKELETAL:   SEE BELOW\par \par Right knee exam demonstrates no signs of acute trauma. No erythema or ecchymosis. No surgical scars.  No effusions. Increased habitus. Normal alignment. Passive range of motion is zero to approximately 95° which is limited by body habitus. There is no medial knee tenderness today. Tightness of the right sided iliotibial band is appreciated. No calf discomfort. There is some posterior knee discomfort. There is mild laxity with medial/lateral stresses. No anterior or posterior instability. Mild spider veins. No ulcerations. No venous stasis. Normal sensation to light touch.\par \par Left knee exam demonstrates normal alignment. Increased body habitus.  No erythema or ecchymosis.  No effusions.  Normal temperature.  There is mild anterior knee tenderness. No crepitus. No calf tenderness. Passive range of motion is 0-95° of flexion. No instability. [de-identified] : Bilateral three-view knee x-rays were reviewed.  X-ray of the left knee was reviewed. Implants are in good position. No signs of loosening or fracture.  Right knee demonstrates advanced osteoarthritis with severe joint space narrowing of the medial compartment.  Advanced degenerative changes of the patellofemoral joint are noted.  Diffuse osteophyte formation is also noted.

## 2022-09-12 ENCOUNTER — OUTPATIENT (OUTPATIENT)
Dept: OUTPATIENT SERVICES | Facility: HOSPITAL | Age: 67
LOS: 1 days | End: 2022-09-12
Payer: MEDICARE

## 2022-09-12 ENCOUNTER — APPOINTMENT (OUTPATIENT)
Dept: MRI IMAGING | Facility: IMAGING CENTER | Age: 67
End: 2022-09-12

## 2022-09-12 DIAGNOSIS — D32.9 BENIGN NEOPLASM OF MENINGES, UNSPECIFIED: ICD-10-CM

## 2022-09-12 DIAGNOSIS — Z98.890 OTHER SPECIFIED POSTPROCEDURAL STATES: Chronic | ICD-10-CM

## 2022-09-12 DIAGNOSIS — Z90.49 ACQUIRED ABSENCE OF OTHER SPECIFIED PARTS OF DIGESTIVE TRACT: Chronic | ICD-10-CM

## 2022-09-12 DIAGNOSIS — Z98.84 BARIATRIC SURGERY STATUS: Chronic | ICD-10-CM

## 2022-09-12 DIAGNOSIS — Z87.19 PERSONAL HISTORY OF OTHER DISEASES OF THE DIGESTIVE SYSTEM: Chronic | ICD-10-CM

## 2022-09-12 DIAGNOSIS — Z98.891 HISTORY OF UTERINE SCAR FROM PREVIOUS SURGERY: Chronic | ICD-10-CM

## 2022-09-12 DIAGNOSIS — Z96.652 PRESENCE OF LEFT ARTIFICIAL KNEE JOINT: Chronic | ICD-10-CM

## 2022-09-12 DIAGNOSIS — Z87.39 PERSONAL HISTORY OF OTHER DISEASES OF THE MUSCULOSKELETAL SYSTEM AND CONNECTIVE TISSUE: Chronic | ICD-10-CM

## 2022-09-12 PROCEDURE — 76498 UNLISTED MR PROCEDURE: CPT

## 2022-09-12 PROCEDURE — 77263 THER RADIOLOGY TX PLNG CPLX: CPT

## 2022-09-12 PROCEDURE — A9585: CPT

## 2022-09-12 PROCEDURE — 77290 THER RAD SIMULAJ FIELD CPLX: CPT | Mod: 26

## 2022-09-12 PROCEDURE — 77334 RADIATION TREATMENT AID(S): CPT | Mod: 26

## 2022-09-13 PROCEDURE — 77300 RADIATION THERAPY DOSE PLAN: CPT | Mod: 26

## 2022-09-13 PROCEDURE — 77295 3-D RADIOTHERAPY PLAN: CPT | Mod: 26

## 2022-09-20 PROCEDURE — 77435 SBRT MANAGEMENT: CPT

## 2022-09-21 ENCOUNTER — NON-APPOINTMENT (OUTPATIENT)
Age: 67
End: 2022-09-21

## 2022-11-19 ENCOUNTER — NON-APPOINTMENT (OUTPATIENT)
Age: 67
End: 2022-11-19

## 2022-11-20 ENCOUNTER — APPOINTMENT (OUTPATIENT)
Dept: MRI IMAGING | Facility: CLINIC | Age: 67
End: 2022-11-20

## 2022-11-20 ENCOUNTER — OUTPATIENT (OUTPATIENT)
Dept: OUTPATIENT SERVICES | Facility: HOSPITAL | Age: 67
LOS: 1 days | End: 2022-11-20
Payer: MEDICARE

## 2022-11-20 DIAGNOSIS — Z98.890 OTHER SPECIFIED POSTPROCEDURAL STATES: Chronic | ICD-10-CM

## 2022-11-20 DIAGNOSIS — Z98.84 BARIATRIC SURGERY STATUS: Chronic | ICD-10-CM

## 2022-11-20 DIAGNOSIS — Z90.49 ACQUIRED ABSENCE OF OTHER SPECIFIED PARTS OF DIGESTIVE TRACT: Chronic | ICD-10-CM

## 2022-11-20 DIAGNOSIS — Z87.19 PERSONAL HISTORY OF OTHER DISEASES OF THE DIGESTIVE SYSTEM: Chronic | ICD-10-CM

## 2022-11-20 DIAGNOSIS — Z96.652 PRESENCE OF LEFT ARTIFICIAL KNEE JOINT: Chronic | ICD-10-CM

## 2022-11-20 DIAGNOSIS — Z98.891 HISTORY OF UTERINE SCAR FROM PREVIOUS SURGERY: Chronic | ICD-10-CM

## 2022-11-20 DIAGNOSIS — Z87.39 PERSONAL HISTORY OF OTHER DISEASES OF THE MUSCULOSKELETAL SYSTEM AND CONNECTIVE TISSUE: Chronic | ICD-10-CM

## 2022-11-20 DIAGNOSIS — D32.9 BENIGN NEOPLASM OF MENINGES, UNSPECIFIED: ICD-10-CM

## 2022-11-20 PROCEDURE — 70543 MRI ORBT/FAC/NCK W/O &W/DYE: CPT | Mod: MH

## 2022-11-20 PROCEDURE — A9585: CPT

## 2022-11-20 PROCEDURE — 70553 MRI BRAIN STEM W/O & W/DYE: CPT | Mod: MH

## 2022-11-20 PROCEDURE — 70553 MRI BRAIN STEM W/O & W/DYE: CPT | Mod: 26,MH

## 2022-11-20 PROCEDURE — 70543 MRI ORBT/FAC/NCK W/O &W/DYE: CPT | Mod: 26,MH

## 2022-11-28 ENCOUNTER — APPOINTMENT (OUTPATIENT)
Dept: RADIATION ONCOLOGY | Facility: CLINIC | Age: 67
End: 2022-11-28

## 2022-11-29 NOTE — PROGRESS NOTE ADULT - SUBJECTIVE AND OBJECTIVE BOX
CHIEF COMPLAINT:    SUBJECTIVE:   HPI:  Pt is a 66 y/o F w/  PMHx hypothyroidism,  Rectal ca in 2016 s/p Chemo, Obesity,  GIOVANNA on CPAP, Pna in Dec 2019 who   c/o fever,  increasing SOB ,  and recent outpatient PNA , COVID positive.  Pt was diagnosed with pneumonia on   3/18/20 after an CXR and COVID testing was sent.  Initially she was given amoxicillin and it was switched to doxycycline. Pt completed 5 days of doxycycline.  She saw Dr. Roach , three days prior to admission and was started on prednisone, tylenol, and albuterol nebs. The day prior to admission she was notified of her positive COVID-19 test.   Pt reports the morning of day of admission, she woke from 3am-6am with SOB and severe cough.  Her O2 sat at home was 88%,  she took her albuterol nebs and the saturation improved to 92%.  Pt reports a fever of 102F and increasing SOB with activity and discomfort in her chest assoc with SOB and cough with yellowish sputum.  She denies nausea or myalgias or abd pain.  Pt reports a  loss of taste for the last 2 days. (23 Mar 2020 17:52)    REVIEW OF SYSTEMS:  CONSTITUTIONAL: No weakness, fevers or chills, improvement overall  EYES/ENT: No visual changes;  No vertigo or throat pain   NECK: No pain or stiffness  RESPIRATORY: + cough, no wheezing or hemoptysis; + shortness of breath improved  CARDIOVASCULAR: No chest pain or palpitations  GASTROINTESTINAL: No abdominal or epigastric pain. No nausea, vomiting, or hematemesis; No diarrhea or constipation. No melena or hematochezia.  GENITOURINARY: No dysuria, frequency or hematuria  NEUROLOGICAL: No numbness or weakness  SKIN: No itching, burning, rashes, or lesions   All other review of systems is negative unless indicated above    Vital Signs Last 24 Hrs  T(C): 37 (25 Mar 2020 15:53), Max: 37 (25 Mar 2020 15:53)  T(F): 98.6 (25 Mar 2020 15:53), Max: 98.6 (25 Mar 2020 15:53)  HR: 68 (25 Mar 2020 15:53) (68 - 88)  BP: 117/64 (25 Mar 2020 15:53) (117/64 - 151/85)  BP(mean): --  RR: 18 (25 Mar 2020 15:53) (18 - 19)  SpO2: 96% (25 Mar 2020 15:53) (93% - 98%)    I&O's Summary      CAPILLARY BLOOD GLUCOSE          PHYSICAL EXAM:  Constitutional: NAD, awake and alert, well-developed  HEENT: PERR, EOMI, Normal Hearing, MMM  Neck: Soft and supple, No LAD  Respiratory: Breath sounds are clear bilaterally, No wheezing, rales or rhonchi  Cardiovascular: S1 and S2, regular rate and rhythm, no Murmurs, gallops or rubs  Gastrointestinal: Bowel Sounds present, soft, nontender, nondistended, no guarding, no rebound  Extremities: No peripheral edema  Vascular: 2+ peripheral pulses  Neurological: A/O x 3, no focal deficits  Musculoskeletal: 5/5 strength b/l upper and lower extremities  Skin: No rashes    MEDICATIONS:  MEDICATIONS  (STANDING):  atorvastatin 20 milliGRAM(s) Oral at bedtime  azithromycin   Tablet 250 milliGRAM(s) Oral daily  cefTRIAXone Injectable. 1000 milliGRAM(s) IV Push every 24 hours  cefTRIAXone Injectable.      enoxaparin Injectable 40 milliGRAM(s) SubCutaneous at bedtime  hydroxychloroquine 200 milliGRAM(s) Oral every 12 hours  influenza   Vaccine 0.5 milliLiter(s) IntraMuscular once  levothyroxine 25 MICROGram(s) Oral daily  pantoprazole    Tablet 40 milliGRAM(s) Oral before breakfast  tiotropium 18 MICROgram(s) Capsule 1 Capsule(s) Inhalation daily      LABS: All Labs Reviewed:            CARDIAC MARKERS ( 24 Mar 2020 09:18 )  <0.015 ng/mL / x     / x     / x     / x          Blood Culture:     RADIOLOGY/EKG:  < from: Xray Chest 1 View- PORTABLE-Urgent (03.23.20 @ 13:15) >  Impression: Subtle infiltrates bilaterally. Underlying pneumonia including viral pneumonia not excluded    < end of copied text >    DVT PPX:  lovenox 40  ADVANCED DIRECTIVE:    DISPOSITION: CHIEF COMPLAINT:    SUBJECTIVE:   HPI:  Pt is a 64 y/o F w/  PMHx hypothyroidism,  Rectal ca in 2016 s/p Chemo, Obesity,  GIOVANNA on CPAP, Pna in Dec 2019 who   c/o fever,  increasing SOB ,  and recent outpatient PNA , COVID positive.  Pt was diagnosed with pneumonia on   3/18/20 after an CXR and COVID testing was sent.  Initially she was given amoxicillin and it was switched to doxycycline. Pt completed 5 days of doxycycline.  She saw Dr. Roach , three days prior to admission and was started on prednisone, tylenol, and albuterol nebs. The day prior to admission she was notified of her positive COVID-19 test.   Pt reports the morning of day of admission, she woke from 3am-6am with SOB and severe cough.  Her O2 sat at home was 88%,  she took her albuterol nebs and the saturation improved to 92%.  Pt reports a fever of 102F and increasing SOB with activity and discomfort in her chest assoc with SOB and cough with yellowish sputum.  She denies nausea or myalgias or abd pain.  Pt reports a  loss of taste for the last 2 days. (23 Mar 2020 17:52)    3/25 Pt states she still has the cough but feels much better than she did when she was at home. Pt has no complaints.    REVIEW OF SYSTEMS:  CONSTITUTIONAL: No weakness, fevers or chills, improvement overall  EYES/ENT: No visual changes;  No vertigo or throat pain   NECK: No pain or stiffness  RESPIRATORY: + cough, no wheezing or hemoptysis; + shortness of breath improved  CARDIOVASCULAR: No chest pain or palpitations  GASTROINTESTINAL: No abdominal or epigastric pain. No nausea, vomiting, or hematemesis; No diarrhea or constipation. No melena or hematochezia.  GENITOURINARY: No dysuria, frequency or hematuria  NEUROLOGICAL: No numbness or weakness  SKIN: No itching, burning, rashes, or lesions   All other review of systems is negative unless indicated above    Vital Signs Last 24 Hrs  T(C): 37 (25 Mar 2020 15:53), Max: 37 (25 Mar 2020 15:53)  T(F): 98.6 (25 Mar 2020 15:53), Max: 98.6 (25 Mar 2020 15:53)  HR: 68 (25 Mar 2020 15:53) (68 - 88)  BP: 117/64 (25 Mar 2020 15:53) (117/64 - 151/85)  BP(mean): --  RR: 18 (25 Mar 2020 15:53) (18 - 19)  SpO2: 96% (25 Mar 2020 15:53) (93% - 98%)    I&O's Summary      CAPILLARY BLOOD GLUCOSE          PHYSICAL EXAM:  Constitutional: NAD, awake and alert, well-developed  HEENT: PERR, EOMI, Normal Hearing, MMM  Neck: Soft and supple, No LAD  Respiratory: Breath sounds are clear bilaterally, No wheezing, rales or rhonchi  Cardiovascular: S1 and S2, regular rate and rhythm, no Murmurs, gallops or rubs  Gastrointestinal: Bowel Sounds present, soft, nontender, nondistended, no guarding, no rebound  Extremities: No peripheral edema  Vascular: 2+ peripheral pulses  Neurological: A/O x 3, no focal deficits  Musculoskeletal: 5/5 strength b/l upper and lower extremities  Skin: No rashes    MEDICATIONS:  MEDICATIONS  (STANDING):  atorvastatin 20 milliGRAM(s) Oral at bedtime  azithromycin   Tablet 250 milliGRAM(s) Oral daily  cefTRIAXone Injectable. 1000 milliGRAM(s) IV Push every 24 hours  cefTRIAXone Injectable.      enoxaparin Injectable 40 milliGRAM(s) SubCutaneous at bedtime  hydroxychloroquine 200 milliGRAM(s) Oral every 12 hours  influenza   Vaccine 0.5 milliLiter(s) IntraMuscular once  levothyroxine 25 MICROGram(s) Oral daily  pantoprazole    Tablet 40 milliGRAM(s) Oral before breakfast  tiotropium 18 MICROgram(s) Capsule 1 Capsule(s) Inhalation daily      LABS: All Labs Reviewed:            CARDIAC MARKERS ( 24 Mar 2020 09:18 )  <0.015 ng/mL / x     / x     / x     / x          Blood Culture:     RADIOLOGY/EKG:  < from: Xray Chest 1 View- PORTABLE-Urgent (03.23.20 @ 13:15) >  Impression: Subtle infiltrates bilaterally. Underlying pneumonia including viral pneumonia not excluded    < end of copied text >    DVT PPX:  lovenox 40  ADVANCED DIRECTIVE:    DISPOSITION: Negative

## 2022-11-30 ENCOUNTER — NON-APPOINTMENT (OUTPATIENT)
Age: 67
End: 2022-11-30

## 2022-12-04 NOTE — PATIENT PROFILE ADULT - LEGAL HELP
[facility] Medicine  Telemedicine Progress Note    Patient Name: Dave Good  MRN: [id number]  Patient Class: IP- Inpatient   Admission Date: 11/22/2022  Length of Stay: 11 days  Attending Physician: Aminah Ricardo MD  Primary Care Provider: Reyna Jorge NP          Subjective:     Principal Problem:Diabetic wet gangrene of the foot        HPI:  DM2, diabetic neuropathy, COPD, HTN, depression, right foot transmetatarsal amputation and osteomyelitis who was initially admitted to [facility] from Dr. Flores's office for osteomyelitis evaluation.  Patient knows progressively worsening foul-smelling discharge from his left great toe for which he sought care with Dr. Flores.  Patient was evaluated [facility] by surgery.  He will require further inpatient evaluation for osteomyelitis.       Overview/Hospital Course:  Into the hospital from general surgery clinic for evaluation of left great toe osteomyelitis.  CT imaging showing fluid collection at the left 1st MTP.  Bedside debridement performed by Podiatry.  Bone biopsy was recommended.    During hospitalization, patient had midline placed vascular access issues.  Later his hospitalization is noted right upper extremity edema.  Ultrasound Doppler right upper extremity revealed DVT extending from the right basilic to the axillary vein.  Per discussion with Hematology/Oncology, initiated therapeutic anticoagulation.      Interval History:  Patient noted to be HD stable and afebrile. No acute events overnight and no new complaints this morning.  Medically stable for discharge, pending placement to LTAC    Review of Systems   Constitutional:  Positive for activity change and fatigue. Negative for fever.   Respiratory:  Negative for cough and shortness of breath.    Cardiovascular:  Negative for chest pain.   Gastrointestinal:  Negative for abdominal pain.   Musculoskeletal:  Positive for arthralgias.   Neurological:  Negative for dizziness and  headaches.   Psychiatric/Behavioral:  Negative for confusion.    All other systems reviewed and are negative.  Objective:     Vital Signs (Most Recent):  Temp: 98.5 °F (36.9 °C) (12/03/22 2330)  Pulse: 72 (12/03/22 2330)  Resp: 20 (12/03/22 2330)  BP: 107/66 (12/03/22 2330)  SpO2: (!) 94 % (12/03/22 2330)   Vital Signs (24h Range):  Temp:  [97.9 °F (36.6 °C)-98.5 °F (36.9 °C)] 98.5 °F (36.9 °C)  Pulse:  [67-78] 72  Resp:  [16-20] 20  SpO2:  [94 %-98 %] 94 %  BP: ()/(64-78) 107/66     Weight: 122.5 kg (270 lb 1 oz)  Body mass index is 34.67 kg/m².    Intake/Output Summary (Last 24 hours) at 12/4/2022 0104  Last data filed at 12/3/2022 2100  Gross per 24 hour   Intake 540 ml   Output 1200 ml   Net -660 ml        Physical Exam  Vitals and nursing note reviewed.   Constitutional:       Appearance: Normal appearance.   HENT:      Head: Normocephalic and atraumatic.   Eyes:      Extraocular Movements: Extraocular movements intact.      Pupils: Pupils are equal, round, and reactive to light.   Cardiovascular:      Rate and Rhythm: Normal rate and regular rhythm.   Pulmonary:      Effort: Pulmonary effort is normal. No respiratory distress.   Abdominal:      General: Abdomen is flat. There is no distension.   Musculoskeletal:         General: Normal range of motion.      Cervical back: Normal range of motion.      Comments: Left toe wound in bandage.  Right foot covered in bandage.   Neurological:      General: No focal deficit present.      Mental Status: He is alert and oriented to person, place, and time.   Psychiatric:         Mood and Affect: Mood normal.         Behavior: Behavior normal.       Significant Labs: All pertinent labs within the past 24 hours have been reviewed.  CBC:   Recent Labs   Lab 12/03/22  0352   WBC 9.51   HGB 11.0*   HCT 35.8*   *     CMP: No results for input(s): NA, K, CL, CO2, GLU, BUN, CREATININE, CALCIUM, PROT, ALBUMIN, BILITOT, ALKPHOS, AST, ALT, ANIONGAP, EGFRNONAA in the  last 48 hours.    Invalid input(s): DALEAFALEX    Significant Imaging: I have reviewed all pertinent imaging results/findings within the past 24 hours.      Assessment/Plan:      * Diabetic wet gangrene of the foot  Poor foot care with Left great toe wound concerning for wet gangrene.  Left 1st MTP fluid collection on imaging.  The patient declines amputation this time and would prefer medical treatment over transmetatarsal amputation.  Patient was started on IV Vanc and Zosyn in ED and changed to IV Vanc and Cefepime on admission.  Podiatry performed bedside debridement on 11/25 and bone biopsy on 11/27/2022.  Wound cultures sent on 11/27/2022 and pending.  Blood cultures negative to date.  Cefepime changed to Ertapenem on 11/29/2022.  IV Vanc changed to Daptomycin on 11/30/2022.    Plan:  Per ID recommendations -Empiric daptomycin 6 mg/kg IV and ertapenem 1 gm IV Q24 for 6 weeks total. End of care = 1/3/22  Follow up wound cultures - no growth to date  Rehab vs LTAC on discharge - pending      Epistaxis    -nasal saline spray  -resolved    Diabetic ulcer of left great toe    Lab Results   Component Value Date    HGBA1C 9.1 (H) 11/22/2022     Most recent fingerstick glucose reviewed-   Recent Labs   Lab 11/26/22 2006 11/27/22  0746 11/27/22  1135   POCTGLUCOSE 205* 190* 209*     Current correctional scale    Antihyperglycemics (From admission, onward)    Start     Stop Route Frequency Ordered    11/27/22 2100  insulin detemir U-100 pen 30 Units         -- SubQ Nightly 11/27/22 1536    11/24/22 1330  insulin aspart U-100 pen 1-10 Units         -- SubQ Before meals & nightly PRN 11/24/22 1231        Hold Oral hypoglycemics while patient is in the hospital.    NOTE:  Patient usually takes 40 units basal insulin q.h.s.  75% of his typical basal dose has been ordered as he will be NPO after midnight in anticipation for bone biopsy Monday morning    Acute deep vein thrombosis (DVT) of axillary vein of right upper  extremity  Acute catheter associated DVT extending to the deep veins of the right upper extremity seen on ultrasound Doppler 11/26.  Patient had a right basilic midline catheter, which was initially placed due to poor vascular access.  He was not taking therapeutic anticoagulation at the time.  Case was discussed with Hematology/Oncology (Dr. Salinas) who advised therapeutic anticoagulation given that the thrombus is extending to the axillary vein.  Left upper extremity PICC line placed 11/26.  RUE midline d/c on 11/28/2022    Plan:  Continue with Lovenox 1 milligram/kilogram x 3 months        COPD with asthma  Not in exacerbation  -DuoNebs p.r.n.    Class 1 obesity due to excess calories with serious comorbidity and body mass index (BMI) of 34.0 to 34.9 in adult  Body mass index is 34.67 kg/m². Morbid obesity complicates all aspects of disease management from diagnostic modalities to treatment. Weight loss encouraged and health benefits explained to patient.         Mixed hyperlipidemia  -Continue Statin      Depression  -Continue Seroquel        HTN (hypertension)  Home Meds:  Lisinopril 10mg and HCTZ 25mg  -Continue home meds  -Monitor and adjust as needed      Type 2 diabetes mellitus with diabetic peripheral angiopathy without gangrene, with long-term current use of insulin  Hemoglobin A1c 9.1% this admission  Home Meds:  Metformin 1g bid, Trulicity, Detemir 50 units, Aspart 10 unit TIDWM  Hospital Meds:  Detemir 35 units with moderate dose SSI  -Continue to hold Metformin and Trulicity  -Continue current regimen  -Diabetic Diet  -Monitor and adjust as needed      VTE Risk Mitigation (From admission, onward)         Ordered     enoxaparin injection 120 mg  Every 12 hours (non-standard times)         11/28/22 0957     IP VTE HIGH RISK PATIENT  Once         11/22/22 1724     Place sequential compression device  Until discontinued         11/22/22 1724                      I have completed this tele-visit without the  assistance of a telepresenter.    The attending portion of this evaluation, treatment, and documentation was performed per Aminah Ricardo MD via Telemedicine AudioVisual using the secure Wave - Private Location App software platform with 2 way audio/video. The provider was located off-site and the patient is located in the hospital. The aforementioned video software was utilized to document the relevant history and physical exam    Aminah Ricardo MD  Department of Hospital Medicine   [facility] ([place])   no

## 2022-12-19 ENCOUNTER — NON-APPOINTMENT (OUTPATIENT)
Age: 67
End: 2022-12-19

## 2023-01-03 ENCOUNTER — NON-APPOINTMENT (OUTPATIENT)
Age: 68
End: 2023-01-03

## 2023-02-07 ENCOUNTER — APPOINTMENT (OUTPATIENT)
Dept: NEUROLOGY | Facility: CLINIC | Age: 68
End: 2023-02-07
Payer: MEDICARE

## 2023-02-07 VITALS
SYSTOLIC BLOOD PRESSURE: 116 MMHG | DIASTOLIC BLOOD PRESSURE: 78 MMHG | BODY MASS INDEX: 43.59 KG/M2 | TEMPERATURE: 208.04 F | WEIGHT: 246 LBS | HEART RATE: 69 BPM | HEIGHT: 63 IN

## 2023-02-07 DIAGNOSIS — E53.8 DEFICIENCY OF OTHER SPECIFIED B GROUP VITAMINS: ICD-10-CM

## 2023-02-07 PROCEDURE — 99214 OFFICE O/P EST MOD 30 MIN: CPT

## 2023-02-07 RX ORDER — PHENTERMINE HYDROCHLORIDE 37.5 MG/1
37.5 CAPSULE ORAL
Qty: 30 | Refills: 0 | Status: DISCONTINUED | COMMUNITY
Start: 2020-06-04 | End: 2023-02-07

## 2023-02-07 RX ORDER — PRASTERONE (DHEA) 50 MG
500 CAPSULE ORAL
Refills: 0 | Status: DISCONTINUED | COMMUNITY
Start: 2022-08-16 | End: 2023-02-07

## 2023-02-07 RX ORDER — MECOBALAMIN 1000 MCG
1 TABLET,CHEWABLE ORAL
Refills: 0 | Status: DISCONTINUED | COMMUNITY
Start: 2022-08-16 | End: 2023-02-07

## 2023-02-07 RX ORDER — PANTOPRAZOLE 40 MG/1
40 TABLET, DELAYED RELEASE ORAL DAILY
Qty: 30 | Refills: 0 | Status: DISCONTINUED | COMMUNITY
Start: 2018-06-06 | End: 2023-02-07

## 2023-02-07 RX ORDER — OMEPRAZOLE 40 MG/1
40 CAPSULE, DELAYED RELEASE ORAL
Refills: 0 | Status: ACTIVE | COMMUNITY

## 2023-02-07 NOTE — REVIEW OF SYSTEMS
[Tension Headache] : tension-type headaches [Joint Pain] : joint pain [Negative] : Heme/Lymph [As Noted in HPI] : as noted in HPI [Memory Lapses or Loss] : memory loss [Decr. Concentrating Ability] : decreased concentrating ability [Poor Coordination] : poor coordination [Feeling Tired] : not feeling tired [Migraine Headache] : no migraine headache [Sleep Disturbances] : no sleep disturbances [Eyesight Problems] : no eyesight problems [Arthralgias] : no arthralgias

## 2023-02-07 NOTE — DISCUSSION/SUMMARY
[FreeTextEntry1] : 67-year-old F with PMHx of migraine headaches, history of prior meningioma, noted increase in the size of left side meningioma  2.7 cm x 2.5 cm x 1.4 cm compared with prior size 2.1 cm x 2.0 cm x 0.8 cm, underwent skull base orbital craniotomy for excision of neoplasm by Dr. Marvin in 5/2022,, patient underwent radiation therapy in consideration of atypical meningioma, she has noted remarkable recovery, now presents with c/o short-term memory issues is repetitive, at times becomes defensive.\par \par \par #Mild cognitive impairment; multiple etiologies -was initially thought to be chemo brain following chemo for anal cancer, improved, more recently family and friends have noted worsening since meningioma resection/RT \par \par #Review of labs show very low / almost deficient B12 levels in the past , patient does report she got B12 injections many years ago\par \par -We will obtain B12, folate, TSH and MMA level, after the tests will side on supplementation\par -Perform cognitive testing in 10-12-week\par \par #History of severe migraines previously, post menopause few mild migraines per year usually triggered by weather change\par \par - NSAIDs as needed\par -Patient provided.\par \par

## 2023-02-07 NOTE — HISTORY OF PRESENT ILLNESS
[FreeTextEntry1] : 67-year-old female presents today accompanied by her daughter, had been under care of Dr. Sahu last seen on 1/3/2022,  is here for transition of care and also discuss some new issues.\par \par Patient's daughter reports that after MRI of the brain revealed increase in the size of left side meningioma  2.7 cm x 2.5 cm x 1.4cm compared with prior size 2.1 cm x 2.0 cm x 0.8 cm, she consulted neurosurgeon Dr. Marvin, underwent meningioma resection in 5/2022, due to invasive nature of the tumor she had to undergo radiation for 2 weeks in September 2022.  She reports she made remarkable recovery, she had some visual symptoms, was seen by Dr. Janay daily the ophthalmologist, no residual deficits were noted.\par \par Patient has noted total resolution of severe migraine headaches following menopause, currently she reports she gets a milder headache every few months that lasts 2-3 days, it is usually triggered by changes in season or weather, it resolves by NSAIDs.\par \par Patient's daughter also brings to my attention that patient has been noted to have short-term memory issues, friends have noted that she is repetitive, at times becomes defensive, daughter who is a nurse reports that patient had some short-term memory issues following chemo for anal cancer in 2015, it was thought to be chemo brain, it improved, reports following current craniotomy for meningioma following prolonged anesthesia she was confused for a short time but it has improved. She is fully functional, involved in taking care of her grandchildren and is able to perform all her ADLs without any difficulty

## 2023-02-07 NOTE — PHYSICAL EXAM
[General Appearance - Alert] : alert [General Appearance - In No Acute Distress] : in no acute distress [Oriented To Time, Place, And Person] : oriented to person, place, and time [Affect] : the affect was normal [Person] : oriented to person [Place] : oriented to place [Time] : oriented to time [Concentration Intact] : normal concentrating ability [Naming Objects] : no difficulty naming common objects [Repeating Phrases] : no difficulty repeating a phrase [Fluency] : fluency intact [Comprehension] : comprehension intact [Past History] : adequate knowledge of personal past history [Cranial Nerves Optic (II)] : visual acuity intact bilaterally,  visual fields full to confrontation, pupils equal round and reactive to light [Cranial Nerves Oculomotor (III)] : extraocular motion intact [Cranial Nerves Trigeminal (V)] : facial sensation intact symmetrically [Cranial Nerves Facial (VII)] : face symmetrical [Cranial Nerves Vestibulocochlear (VIII)] : hearing was intact bilaterally [Cranial Nerves Glossopharyngeal (IX)] : tongue and palate midline [Cranial Nerves Accessory (XI - Cranial And Spinal)] : head turning and shoulder shrug symmetric [Cranial Nerves Hypoglossal (XII)] : there was no tongue deviation with protrusion [Motor Tone] : muscle tone was normal in all four extremities [Motor Strength] : muscle strength was normal in all four extremities [No Muscle Atrophy] : normal bulk in all four extremities [Sensation Tactile Decrease] : light touch was intact [Limited Balance] : the patient's balance was impaired [1+] : Patella left 1+ [0] : Ankle jerk left 0 [Registration Intact] : recent registration memory intact [Short Term Intact] : short term memory impaired [Past-pointing] : there was no past-pointing [Tremor] : no tremor present [Plantar Reflex Right Only] : normal on the right [Plantar Reflex Left Only] : normal on the left [FreeTextEntry8] : Mildly antalgic gait [PERRL With Normal Accommodation] : pupils were equal in size, round, reactive to light, with normal accommodation [Extraocular Movements] : extraocular movements were intact [No APD] : no afferent pupillary defect [Full Visual Field] : full visual field [Outer Ear] : the ears and nose were normal in appearance [Hearing Threshold Finger Rub Not Flathead] : hearing was normal [Neck Cervical Mass (___cm)] : no neck mass was observed [] : no respiratory distress [Auscultation Breath Sounds / Voice Sounds] : lungs were clear to auscultation bilaterally [Arterial Pulses Carotid] : carotid pulses were normal with no bruits [Edema] : there was no peripheral edema [Involuntary Movements] : no involuntary movements were seen

## 2023-02-07 NOTE — REASON FOR VISIT
[Follow-Up: _____] : a [unfilled] follow-up visit [Family Member] : family member [FreeTextEntry1] : For headaches, meningioma resection and mild memory/cognitive changes

## 2023-02-07 NOTE — DATA REVIEWED
[de-identified] : 1/19/2022: MR brain W AW IC: Mild increase in size of the extra-axial mass/meningioma along the anterior aspect of the left middle cranial fossa.  Measures 2.7 X 2.5 X 1.4 cm.  Previously measured 2.1 X 2.0 x 0. 8 cm\par 11/25/2020: MR brain W AW IC: Small left middle cranial fossa meningioma, scattered white matter ischemic changes.

## 2023-02-08 NOTE — ASU PATIENT PROFILE, ADULT - ALCOHOL USE HISTORY SINGLE SELECT
Prior Authorization Retail Medication Request    Medication/Dose: tazarotene (TAZORAC) 0.05 % external cream  ICD code (if different than what is on RX):  Adult acne [L70.9]   Previously Tried and Failed:  unknown  Rationale:  unknown    Insurance Name:  [employer]  Insurance ID:  [id number]     yes...

## 2023-02-09 ENCOUNTER — NON-APPOINTMENT (OUTPATIENT)
Age: 68
End: 2023-02-09

## 2023-02-10 ENCOUNTER — APPOINTMENT (OUTPATIENT)
Dept: GASTROENTEROLOGY | Facility: AMBULATORY MEDICAL SERVICES | Age: 68
End: 2023-02-10
Payer: MEDICARE

## 2023-02-10 PROCEDURE — 43239 EGD BIOPSY SINGLE/MULTIPLE: CPT | Mod: GC

## 2023-03-03 NOTE — CONSULT NOTE ADULT - PROBLEM SELECTOR PROBLEM 1
Encounter Date: 3/2/2023       History     Chief Complaint   Patient presents with    Laceration     Left ankle laceration onset about 8:30am. While at work today, coworker raised up a metal panel and accidentally hit patient's left ankle.     POV to the ED with family.  Patient presents for evaluation of laceration to the left lower extremity onset 0 800 this morning.  States he cut it on a piece of metal on his job.  He is not falling workman's comp, agrees to laceration repair, declines work note or crutches, TD unknown.  No other complaints    The history is provided by the patient.   Review of patient's allergies indicates:   Allergen Reactions    Bactrim [sulfamethoxazole-trimethoprim]      Past Medical History:   Diagnosis Date    ADHD      Past Surgical History:   Procedure Laterality Date    MOUTH SURGERY       History reviewed. No pertinent family history.  Social History     Tobacco Use    Smoking status: Former     Types: Cigarettes, Vaping with nicotine    Smokeless tobacco: Current     Types: Chew   Substance Use Topics    Alcohol use: Yes     Comment: Occasional    Drug use: Yes     Types: Marijuana     Review of Systems   Constitutional:  Negative for chills and fever.   HENT:  Negative for ear pain and sore throat.    Respiratory:  Negative for cough and shortness of breath.    Cardiovascular:  Negative for chest pain.   Gastrointestinal:  Negative for abdominal pain, diarrhea, nausea and vomiting.   Musculoskeletal:  Positive for myalgias.   Skin:         Laceration   All other systems reviewed and are negative.    Physical Exam     Initial Vitals [03/02/23 1902]   BP Pulse Resp Temp SpO2   (!) 147/84 88 18 98.2 °F (36.8 °C) 98 %      MAP       --         Physical Exam    Nursing note and vitals reviewed.  Constitutional: He appears well-developed and well-nourished. No distress.   HENT:   Head: Normocephalic and atraumatic.   Mouth/Throat: Oropharynx is clear and moist.   Eyes: Pupils are equal,  round, and reactive to light.   Neck:   Normal range of motion.  Cardiovascular:  Normal rate and regular rhythm.           Pulmonary/Chest: Breath sounds normal.   Abdominal: Abdomen is soft.   Musculoskeletal:         General: Normal range of motion.      Cervical back: Normal range of motion.     Neurological: He is alert and oriented to person, place, and time. He has normal strength. GCS score is 15. GCS eye subscore is 4. GCS verbal subscore is 5. GCS motor subscore is 6.   Skin: Skin is warm and dry.   Noted 1.5 cm to the anterior distal tib-fib area, mild soft tissue swelling.  Mild redness.  Patient declines x-ray of his laceration for foreign body, stating there is nothing in it   Psychiatric: He has a normal mood and affect.       ED Course   Lac Repair    Date/Time: 3/2/2023 9:17 PM  Performed by: Mary Harman NP  Authorized by: Mary Harman NP     Consent:     Consent obtained:  Verbal    Consent given by:  Patient    Risks, benefits, and alternatives were discussed: yes      Risks discussed:  Infection and retained foreign body  Universal protocol:     Imaging studies available: no (Patient declined)      Patient identity confirmed:  Verbally with patient  Anesthesia:     Anesthesia method:  Local infiltration    Local anesthetic:  Lidocaine 1% w/o epi (4 mL)  Laceration details:     Location:  Leg    Leg location:  L lower leg    Length (cm):  1.5  Pre-procedure details:     Preparation:  Patient was prepped and draped in usual sterile fashion  Exploration:     Imaging outcome: foreign body not noted      Contaminated: no    Treatment:     Area cleansed with:  Povidone-iodine    Amount of cleaning:  Standard    Irrigation solution:  Sterile saline    Irrigation volume:  100 ml    Irrigation method:  Syringe    Debridement:  None    Undermining:  None  Skin repair:     Repair method:  Staples    Number of staples:  3  Approximation:     Approximation:  Loose  Repair type:     Repair type:   Simple  Post-procedure details:     Dressing:  Antibiotic ointment and non-adherent dressing (maryam wrap)    Procedure completion:  Tolerated well, no immediate complications  Labs Reviewed   HIV 1 / 2 ANTIBODY   HEPATITIS C ANTIBODY          Imaging Results    None          Medications   HYDROcodone-acetaminophen 7.5-325 mg per tablet 1 tablet (1 tablet Oral Not Given 3/2/23 1930)   neomycin-bacitracnZn-polymyxnB packet (has no administration in time range)   Tdap (BOOSTRIX) vaccine injection 0.5 mL (0.5 mLs Intramuscular Given 3/2/23 1928)   clindamycin capsule 300 mg (300 mg Oral Given 3/2/23 1927)   LIDOcaine (PF) 10 mg/ml (1%) injection 50 mg (40 mg Infiltration Given 3/2/23 1953)     Medical Decision Making:   Initial Assessment:   Presents for evaluation of laceration to the left lower extremity onset at 0800 today.  Agrees to laceration repair.  Declines x-ray declines crutches  Differential Diagnosis:   Laceration, abrasion, foreign body, cellulitis  ED Management:  Wound is prepped, cleansed, staples are placed.  Bacitracin and dressing applied by nursing staff prior to discharge.  First dose of antibiotic given in the ED.  Prescription for same, clindamycin.  Patient agrees with plan of care.  Staple removal in 10-14 days                        Clinical Impression:   Final diagnoses:  [S81.812A] Laceration of left lower extremity, initial encounter (Primary)        ED Disposition Condition    Discharge Stable          ED Prescriptions       Medication Sig Dispense Start Date End Date Auth. Provider    clindamycin (CLEOCIN) 300 MG capsule Take 1 capsule (300 mg total) by mouth every 6 (six) hours. for 10 days 40 capsule 3/2/2023 3/12/2023 Mary Harman NP    diclofenac (VOLTAREN) 75 MG EC tablet Take 1 tablet (75 mg total) by mouth 2 (two) times daily. 30 tablet 3/2/2023 -- Mary Harman NP          Follow-up Information       Follow up With Specialties Details Why Contact Info    [facility]  In 3 days For office recheck              Mary Harman, LAMIN  03/02/23 3219     Primary osteoarthritis of both knees

## 2023-03-13 ENCOUNTER — APPOINTMENT (OUTPATIENT)
Dept: MRI IMAGING | Facility: CLINIC | Age: 68
End: 2023-03-13
Payer: MEDICARE

## 2023-03-13 ENCOUNTER — OUTPATIENT (OUTPATIENT)
Dept: OUTPATIENT SERVICES | Facility: HOSPITAL | Age: 68
LOS: 1 days | End: 2023-03-13
Payer: MEDICARE

## 2023-03-13 DIAGNOSIS — Z96.652 PRESENCE OF LEFT ARTIFICIAL KNEE JOINT: Chronic | ICD-10-CM

## 2023-03-13 DIAGNOSIS — Z98.890 OTHER SPECIFIED POSTPROCEDURAL STATES: Chronic | ICD-10-CM

## 2023-03-13 DIAGNOSIS — Z90.49 ACQUIRED ABSENCE OF OTHER SPECIFIED PARTS OF DIGESTIVE TRACT: Chronic | ICD-10-CM

## 2023-03-13 DIAGNOSIS — Z87.39 PERSONAL HISTORY OF OTHER DISEASES OF THE MUSCULOSKELETAL SYSTEM AND CONNECTIVE TISSUE: Chronic | ICD-10-CM

## 2023-03-13 DIAGNOSIS — Z98.891 HISTORY OF UTERINE SCAR FROM PREVIOUS SURGERY: Chronic | ICD-10-CM

## 2023-03-13 DIAGNOSIS — Z87.19 PERSONAL HISTORY OF OTHER DISEASES OF THE DIGESTIVE SYSTEM: Chronic | ICD-10-CM

## 2023-03-13 DIAGNOSIS — D32.9 BENIGN NEOPLASM OF MENINGES, UNSPECIFIED: ICD-10-CM

## 2023-03-13 DIAGNOSIS — Z98.84 BARIATRIC SURGERY STATUS: Chronic | ICD-10-CM

## 2023-03-13 PROCEDURE — A9585: CPT

## 2023-03-13 PROCEDURE — 70553 MRI BRAIN STEM W/O & W/DYE: CPT | Mod: 26,MH

## 2023-03-13 PROCEDURE — 70553 MRI BRAIN STEM W/O & W/DYE: CPT

## 2023-03-20 RX ORDER — AMOXICILLIN 500 MG/1
500 CAPSULE ORAL
Qty: 12 | Refills: 2 | Status: ACTIVE | COMMUNITY
Start: 2023-03-20 | End: 1900-01-01

## 2023-03-22 ENCOUNTER — APPOINTMENT (OUTPATIENT)
Dept: RADIATION ONCOLOGY | Facility: CLINIC | Age: 68
End: 2023-03-22
Payer: MEDICARE

## 2023-03-22 PROCEDURE — 99024 POSTOP FOLLOW-UP VISIT: CPT

## 2023-03-22 RX ORDER — ROSUVASTATIN CALCIUM 10 MG/1
10 TABLET, FILM COATED ORAL
Refills: 0 | Status: ACTIVE | COMMUNITY

## 2023-03-22 NOTE — VITALS
[Maximal Pain Intensity: 4/10] : 4/10 [Least Pain Intensity: 0/10] : 0/10 [ECOG Performance Status: 1 - Restricted in physically strenuous activity but ambulatory and able to carry out work of a light or sedentary nature] : Performance Status: 1 - Restricted in physically strenuous activity but ambulatory and able to carry out work of a light or sedentary nature, e.g., light house work, office work [Date: ____________] : Patient's last distress assessment performed on [unfilled].

## 2023-03-23 NOTE — PHYSICAL EXAM
[Extraocular Movements] : extraocular movements were intact [Respiration, Rhythm And Depth] : normal respiratory rhythm and effort [Normal] : oriented to person, place and time, the affect was normal, the mood was normal and not anxious [de-identified] : decreased vision due to cataracts

## 2023-03-23 NOTE — HISTORY OF PRESENT ILLNESS
[Home] : at home, [unfilled] , at the time of the visit. [Medical Office: ([facility])___] : at the medical office located in  [Verbal consent obtained from patient] : the patient, [unfilled] [FreeTextEntry1] : Ms Blankensihp is a 68 yrs old female with left middle cranial fossa meningioma, s/p left craniotomy, resection of middle cranial fossa meningioma on 5/24/2022, WHO Grade 2 atypical meningioma. With hx of anal cancer, s/p chemo/XRT that was completed 5/2015, had a CR with short term memory issues since then. She is s/p SRS 2500 cGy in 5 fx to the left meningioma cavity, completed on 9/20/22\par \par Presented in 11/2020 to Dr. Sahu (Neurologist) onset of headaches that had lasted few weeks and not responding to conventional over-the-counter medications.\par \par 11/25/2020- MRI Brain revealed small left middle cranial fossa meningioma. \par \par In follow up visit in 12/2020 with Dr. Sahu she reported resolution of headaches plan to follow up in 6 months.\par \par 1/3/2022- She reported increase headache to Dr. Sahu and scan were ordered. and she was started on Fioricet and Imitrex\par \par 1/19/2022- MRI Brain noted mild interval increase in size of the extra-axial mass/meningioma along the anterior aspect of the left middle cranial fossa, measuring 2.7 x 2.5 x 1.4 cm, previously measured 2.1 x 2.0 x 0.8 cm. \par \par 5/24/2022- she underwent anterior and middle cranial fossa skull base orbital craniotomy for excision of skull base neoplasm, by Dr. Marvin. Pathology revealed atypical meningioma, WHO grade 2 positive for EDWAR and AL. Ki-67  moderately elevated (8 -12%). pHH3 immunostain shows the mitotic activity is increased (up to 5-7 mitoses/10 HPF). \par \par 8/15/2022- Presents today in initial consultation for consideration of radiation therapy. Patient states that she continues to have headaches; however, they are less frequent and tolerable without medication. She has had trouble with focusing and confusion for the last 5 years. it did not improve after surgery . over the last 5  years she notes she easily loses her balance. her left arm and leg area weaker than the right.  She has a shooting pain of the left forehead which has been present since 2019. this did not improve after surgery. Her hearing is worse after surgery. has blurry vision bilaterally, but attributes this to cataracts. \par \par \par 3/22/2023. Ms Blankenship presents for follow up. 3/13/2023 Brain MRI IMPRESSION:\par Prior left pterional craniotomy for removal of a meningioma. Stable postoperative changes. No recurrent or residual meningioma seen. Unremarkable orbits.\par Today, she denies any headaches today, gets occasional mild heache, dizziness, balance issues\par She follows with Neuro for memory loss, and repetition of words.\par

## 2023-03-23 NOTE — REVIEW OF SYSTEMS
[Loss of Hearing] : loss of hearing [Confused] : confusion [Dizziness] : dizziness [Difficulty Walking] : difficulty walking [Negative] : Genitourinary [Fever] : no fever [Chills] : no chills [Night Sweats] : no night sweats [Dysphagia] : no dysphagia [Fainting] : no fainting [FreeTextEntry2] : headache of left forehead [FreeTextEntry3] : vision blurry, both eyes, pending cararact surgery [FreeTextEntry4] : loss of hearing and tinnitus recently [FreeTextEntry9] : left arm and leg feel weaker than right last 5 years [de-identified] : loses balance easily for the last 5 years. pain to left forehead sometimes. forgetful and easily confused [de-identified] : moods  ok

## 2023-04-03 ENCOUNTER — OUTPATIENT (OUTPATIENT)
Dept: OUTPATIENT SERVICES | Facility: HOSPITAL | Age: 68
LOS: 1 days | End: 2023-04-03
Payer: MEDICARE

## 2023-04-03 ENCOUNTER — APPOINTMENT (OUTPATIENT)
Dept: RADIOLOGY | Facility: CLINIC | Age: 68
End: 2023-04-03
Payer: MEDICARE

## 2023-04-03 ENCOUNTER — RESULT REVIEW (OUTPATIENT)
Age: 68
End: 2023-04-03

## 2023-04-03 ENCOUNTER — APPOINTMENT (OUTPATIENT)
Dept: MAMMOGRAPHY | Facility: CLINIC | Age: 68
End: 2023-04-03
Payer: MEDICARE

## 2023-04-03 DIAGNOSIS — Z98.891 HISTORY OF UTERINE SCAR FROM PREVIOUS SURGERY: Chronic | ICD-10-CM

## 2023-04-03 DIAGNOSIS — Z98.84 BARIATRIC SURGERY STATUS: Chronic | ICD-10-CM

## 2023-04-03 DIAGNOSIS — Z98.890 OTHER SPECIFIED POSTPROCEDURAL STATES: Chronic | ICD-10-CM

## 2023-04-03 DIAGNOSIS — Z87.19 PERSONAL HISTORY OF OTHER DISEASES OF THE DIGESTIVE SYSTEM: Chronic | ICD-10-CM

## 2023-04-03 DIAGNOSIS — Z87.39 PERSONAL HISTORY OF OTHER DISEASES OF THE MUSCULOSKELETAL SYSTEM AND CONNECTIVE TISSUE: Chronic | ICD-10-CM

## 2023-04-03 DIAGNOSIS — Z12.39 ENCOUNTER FOR OTHER SCREENING FOR MALIGNANT NEOPLASM OF BREAST: ICD-10-CM

## 2023-04-03 DIAGNOSIS — Z96.652 PRESENCE OF LEFT ARTIFICIAL KNEE JOINT: Chronic | ICD-10-CM

## 2023-04-03 DIAGNOSIS — Z90.49 ACQUIRED ABSENCE OF OTHER SPECIFIED PARTS OF DIGESTIVE TRACT: Chronic | ICD-10-CM

## 2023-04-03 PROCEDURE — 77063 BREAST TOMOSYNTHESIS BI: CPT

## 2023-04-03 PROCEDURE — 77063 BREAST TOMOSYNTHESIS BI: CPT | Mod: 26

## 2023-04-03 PROCEDURE — 77067 SCR MAMMO BI INCL CAD: CPT | Mod: 26

## 2023-04-03 PROCEDURE — 77085 DXA BONE DENSITY AXL VRT FX: CPT

## 2023-04-03 PROCEDURE — 77085 DXA BONE DENSITY AXL VRT FX: CPT | Mod: 26

## 2023-04-03 PROCEDURE — 77067 SCR MAMMO BI INCL CAD: CPT

## 2023-04-03 NOTE — H&P ADULT - REASON FOR ADMISSION
COVID-19  MILLER  Hypoxia  Fevers  Bilateral PNA  GIOVANNA  Asthma Residential stability/Relationship stability/Sobriety/Engagement in treatment

## 2023-04-07 ENCOUNTER — NON-APPOINTMENT (OUTPATIENT)
Age: 68
End: 2023-04-07

## 2023-04-25 ENCOUNTER — APPOINTMENT (OUTPATIENT)
Dept: OBGYN | Facility: CLINIC | Age: 68
End: 2023-04-25
Payer: MEDICARE

## 2023-04-25 VITALS
BODY MASS INDEX: 45.18 KG/M2 | HEIGHT: 63 IN | SYSTOLIC BLOOD PRESSURE: 130 MMHG | WEIGHT: 255 LBS | DIASTOLIC BLOOD PRESSURE: 86 MMHG

## 2023-04-25 DIAGNOSIS — Z01.419 ENCOUNTER FOR GYNECOLOGICAL EXAMINATION (GENERAL) (ROUTINE) W/OUT ABNORMAL FINDINGS: ICD-10-CM

## 2023-04-25 DIAGNOSIS — M81.0 AGE-RELATED OSTEOPOROSIS W/OUT CURRENT PATHOLOGICAL FRACTURE: ICD-10-CM

## 2023-04-25 PROCEDURE — G0101: CPT

## 2023-04-25 RX ORDER — ALENDRONATE SODIUM 70 MG/1
70 TABLET ORAL
Qty: 1 | Refills: 6 | Status: ACTIVE | COMMUNITY
Start: 2023-04-25 | End: 1900-01-01

## 2023-04-27 ENCOUNTER — OUTPATIENT (OUTPATIENT)
Dept: OUTPATIENT SERVICES | Facility: HOSPITAL | Age: 68
LOS: 1 days | End: 2023-04-27
Payer: MEDICARE

## 2023-04-27 ENCOUNTER — APPOINTMENT (OUTPATIENT)
Dept: CT IMAGING | Facility: CLINIC | Age: 68
End: 2023-04-27
Payer: MEDICARE

## 2023-04-27 DIAGNOSIS — Z96.652 PRESENCE OF LEFT ARTIFICIAL KNEE JOINT: Chronic | ICD-10-CM

## 2023-04-27 DIAGNOSIS — K57.90 DIVERTICULOSIS OF INTESTINE, PART UNSPECIFIED, WITHOUT PERFORATION OR ABSCESS WITHOUT BLEEDING: ICD-10-CM

## 2023-04-27 DIAGNOSIS — Z98.891 HISTORY OF UTERINE SCAR FROM PREVIOUS SURGERY: Chronic | ICD-10-CM

## 2023-04-27 DIAGNOSIS — Z98.84 BARIATRIC SURGERY STATUS: Chronic | ICD-10-CM

## 2023-04-27 DIAGNOSIS — Z98.890 OTHER SPECIFIED POSTPROCEDURAL STATES: Chronic | ICD-10-CM

## 2023-04-27 DIAGNOSIS — Z87.39 PERSONAL HISTORY OF OTHER DISEASES OF THE MUSCULOSKELETAL SYSTEM AND CONNECTIVE TISSUE: Chronic | ICD-10-CM

## 2023-04-27 DIAGNOSIS — R10.9 UNSPECIFIED ABDOMINAL PAIN: ICD-10-CM

## 2023-04-27 PROCEDURE — 74177 CT ABD & PELVIS W/CONTRAST: CPT | Mod: 26,MH

## 2023-04-27 PROCEDURE — 74177 CT ABD & PELVIS W/CONTRAST: CPT | Mod: MH

## 2023-05-19 ENCOUNTER — APPOINTMENT (OUTPATIENT)
Dept: ORTHOPEDIC SURGERY | Facility: CLINIC | Age: 68
End: 2023-05-19
Payer: MEDICARE

## 2023-05-19 DIAGNOSIS — M70.61 TROCHANTERIC BURSITIS, RIGHT HIP: ICD-10-CM

## 2023-05-19 DIAGNOSIS — M70.62 TROCHANTERIC BURSITIS, RIGHT HIP: ICD-10-CM

## 2023-05-19 PROCEDURE — 99214 OFFICE O/P EST MOD 30 MIN: CPT

## 2023-05-19 PROCEDURE — 73502 X-RAY EXAM HIP UNI 2-3 VIEWS: CPT | Mod: RT

## 2023-05-19 PROCEDURE — 73562 X-RAY EXAM OF KNEE 3: CPT | Mod: 50

## 2023-05-19 RX ORDER — MELOXICAM 15 MG/1
15 TABLET ORAL
Qty: 30 | Refills: 1 | Status: ACTIVE | COMMUNITY
Start: 2023-05-19 | End: 1900-01-01

## 2023-05-19 NOTE — HISTORY OF PRESENT ILLNESS
[Stable] : stable [de-identified] : Patient is a 68-year-old female who presents today for an evaluation regarding both knees.  She is status post left total knee replacement in 2018.  With some osteoarthritic changes on the right.  Overall she states she has been doing fairly well.  But does feel discomfort in both the right and left knee.  She states that this occurs sporadically but mostly with any strenuous activities including standing walking or even going up and down stairs.  She states that she has tried conservative management however anti-inflammatories do irritate her stomach.  And therefore is very resistant regarding medications.  She does take Tylenol at times.  She does state that she tries to do an exercise program but does still have some residual discomfort.  She states that she also feels discomfort in her right hip.  She states that the pain is mostly localized to the posterior aspect but may feel it along the lateral aspect.  She was diagnosed in the past and is having greater trochanteric bursitis.  But also feels at times it may be a little stiff.  She denies any specific injury or accident.  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  Which further exacerbates her conditions.

## 2023-05-19 NOTE — END OF VISIT
[Time Spent: ___ minutes] : I have spent [unfilled] minutes of time on the encounter. [FreeTextEntry3] : I, Dr. Deanne Harrington MD, personally performed the evaluation and management services for this established patient who presented today with a new problem/exacerbation of an existing condition. That E/M includes conducting the examination, assessing all new/exacerbated conditions, and establishing a new plan of care. Today, MY ACP was here to assist with recording the history in my evaluation and management services of this new problem/exacerbated condition to be followed going forward.\par

## 2023-05-19 NOTE — DISCUSSION/SUMMARY
[de-identified] : The patient was assured that they are progressing well post operatively. They were explained that the prosthesis is in perfect alignment, perfect placement and fixated well.  It was explained that they are progressing well and as expected. It is recommended that the pt continue with the current treatment plan. This includes an ongoing exercise program, ice/moist heat when needed and NSAID's when needed. It was explained that a good exercise routine will help with pain relief and improve the overall longevity of the prosthesis. The patient is advised to return to the office in another year or so for further evaluation and x-rays. However, if there is any increase in pain, redness, swelling, heat, discharge, fever, change in ambulation or pain. The patient is strongly advised to call the office sooner.  In regards to the right knee and right hip.  Patient is advised to continue with conservative management.\par \par I, Dr. Harrington, personally performed the evaluation and management services for this established patient who presents today with an orthopedic condition. The evaluation and management includes conducting the conditions and establishing a plan of care.\par \par Today, my ACP Florin Bautista [facility], was here to assist with the patient in my evaluation and management services for this condition which will be followed going forward.\par \par 35 minutes were spent, face to face, in direct consultation with the patient. This includes reviewing the natural history of their Dx., eliciting the history, performing an orthopedic exam, review of the x-ray findings, forming a differential Dx and discussing all treatment options. This Includes both surgical and non-surgical treatments. I also reviewed all the risks and benefits of non-operative & operative Tx options, future impact into orthopedic functions/problems, activity restrictions both at home and at work, and all follow up requirements.\par \par \par

## 2023-05-19 NOTE — PHYSICAL EXAM
[Normal] : Gait: normal [de-identified] : On physical exam of the right hip.  The skin is clean dry and intact with no areas of redness swelling or heat noted.  There is some mild pain and tenderness primarily on the lateral aspect of the hip at the area of the greater trochanteric bursa.  She does have adequate range of motion with some mild diffuse discomfort noted anteriorly and laterally.  There is no evidence of limb length discrepancy.  No evidence of any muscle atrophy.  No evidence of any motor or sensory deficit.  Patient has good distal pulses with no calf tenderness.\par \par On physical examination of both knees.  The skin is clean dry and intact with no areas of redness swelling or heat noted.  There is a well healed surgical scar noted along the left knee following her knee replacement.  Overall there is some diffuse pain and tenderness but primarily at the patellofemoral compartment.  She does have adequate range of motion both passive actively and against resistance.  With no evidence of any muscle atrophy.  No evidence of any motor or sensory deficit.  Patient has good distal pulses with no calf tenderness. [de-identified] : X-rays of the left knee reveals a status post left total knee replacement. In all three views; AP, lateral and sunrise views. The hardware is in place and shows excellent alignment as well as fixation. There is no evidence of any fracture, dislocation or loosening of any hardware.\par \par X-rays of the right knee reveal some mild osteoarthritic changes.  This is primarily in the patellofemoral and the medial femoral-tibial compartment.  There is no evidence of any fracture or dislocation.\par \par X-rays of the right hip are negative

## 2023-05-22 ENCOUNTER — APPOINTMENT (OUTPATIENT)
Dept: NEUROLOGY | Facility: CLINIC | Age: 68
End: 2023-05-22
Payer: MEDICARE

## 2023-05-22 VITALS
SYSTOLIC BLOOD PRESSURE: 124 MMHG | WEIGHT: 248 LBS | HEIGHT: 63 IN | DIASTOLIC BLOOD PRESSURE: 83 MMHG | BODY MASS INDEX: 43.94 KG/M2 | HEART RATE: 86 BPM

## 2023-05-22 DIAGNOSIS — F06.70 MILD NEUROCOGNITIVE DISORDER DUE TO KNOWN PHYSIOLOGICAL CONDITION WITHOUT BEHAVIORAL DISTURBANCE: ICD-10-CM

## 2023-05-22 DIAGNOSIS — G31.84 MILD COGNITIVE IMPAIRMENT, SO STATED: ICD-10-CM

## 2023-05-22 DIAGNOSIS — G43.109 MIGRAINE WITH AURA, NOT INTRACTABLE, W/OUT STATUS MIGRAINOSUS: ICD-10-CM

## 2023-05-22 PROCEDURE — 99212 OFFICE O/P EST SF 10 MIN: CPT | Mod: 25

## 2023-05-22 PROCEDURE — 96138 PSYCL/NRPSYC TECH 1ST: CPT | Mod: 59

## 2023-05-22 PROCEDURE — 96132 NRPSYC TST EVAL PHYS/QHP 1ST: CPT | Mod: 59

## 2023-05-22 NOTE — DATA REVIEWED
[de-identified] : 1/19/2022: MR brain W AW IC: Mild increase in size of the extra-axial mass/meningioma along the anterior aspect of the left middle cranial fossa.  Measures 2.7 X 2.5 X 1.4 cm.  Previously measured 2.1 X 2.0 x 0. 8 cm\par 11/25/2020: MR brain W AW IC: Small left middle cranial fossa meningioma, scattered white matter ischemic changes. [de-identified] : 2/7/23: Vitamin B-12, folate, TSH, and methylmalonic acid normal. \par

## 2023-05-22 NOTE — REVIEW OF SYSTEMS
[As Noted in HPI] : as noted in HPI [Memory Lapses or Loss] : memory loss [Decr. Concentrating Ability] : decreased concentrating ability [Poor Coordination] : poor coordination [Tension Headache] : tension-type headaches [Joint Pain] : joint pain [Negative] : Heme/Lymph [Feeling Tired] : not feeling tired [Migraine Headache] : no migraine headache [Sleep Disturbances] : no sleep disturbances [Eyesight Problems] : no eyesight problems [Arthralgias] : no arthralgias

## 2023-05-22 NOTE — HISTORY OF PRESENT ILLNESS
[FreeTextEntry1] : Patient is here for a follow-up visit today, last seen on 2/7/2023.  patient has been noted to have short-term memory issues, but is fully functional, after the last visit, labs done revealed normal B12, TSH and MMA levels \par \par Patient is here for cognitive testing today\par

## 2023-05-22 NOTE — DISCUSSION/SUMMARY
[FreeTextEntry1] : 68-year-old F with PMHx of migraine headaches, prior meningioma, noted increase in size meningioma  2.7 cm x 2.5 cm x 1.4 cm compared with prior size 2.1 cm x 2.0 cm x 0.8 cm, underwent skull base craniotomy for excision of neoplasm by Dr. Marvin in 5/2022 + RT in consideration of atypical meningioma, made good recovery, presented with c/o short-term memory issues, is repetitive, at times becomes defensive.\par \par #Mild cognitive impairment; multiple etiologies - cognitive test performed today reveals global score 107,, > 1 SD below average not noted in domains, above average in 6 domains, information processing speed could not be scored due to insufficient data \par \par I had a detailed discussion with the patient, we discussed test results, at this time, I have reassured her that she does not have dementia, her symptoms and cognitive changes are likely multifactorial, we will keep monitoring her,\par - I have recommended continuing cognitive exercises, staying engaged.  \par - We will repeat cognitive test in a year to see any evolution\par \par # History of severe migraines; post menopause few mild migraines per year usually triggered by weather change\par \par - NSAIDs as needed\par - Education provided.\par \par

## 2023-05-22 NOTE — PROCEDURE
[FreeTextEntry1] : Cognitive assessment\par \par Global cognitive score: 107\par \par More than 1 standard deviation below average in domains: None\par \par Below average in domains: None\par \par Above average in domains: Memory 106, executive function 105.6, attention 108.6, visual-spatial function 107.4, verbal function 105.4, motor skills 108.9.\par \par Information processing speed could not be scored due to insufficient data

## 2023-05-24 ENCOUNTER — APPOINTMENT (OUTPATIENT)
Dept: ORTHOPEDIC SURGERY | Facility: CLINIC | Age: 68
End: 2023-05-24
Payer: MEDICARE

## 2023-05-24 VITALS — BODY MASS INDEX: 43.94 KG/M2 | HEIGHT: 63 IN | WEIGHT: 248 LBS

## 2023-05-24 DIAGNOSIS — Z00.00 ENCOUNTER FOR GENERAL ADULT MEDICAL EXAMINATION W/OUT ABNORMAL FINDINGS: ICD-10-CM

## 2023-05-24 PROCEDURE — 73030 X-RAY EXAM OF SHOULDER: CPT | Mod: RT

## 2023-05-24 PROCEDURE — 73010 X-RAY EXAM OF SHOULDER BLADE: CPT | Mod: RT

## 2023-05-24 PROCEDURE — 99214 OFFICE O/P EST MOD 30 MIN: CPT

## 2023-05-24 NOTE — HISTORY OF PRESENT ILLNESS
[6] : 6 [Dull/Aching] : dull/aching [Radiating] : radiating [Throbbing] : throbbing [Constant] : constant [Nothing helps with pain getting better] : Nothing helps with pain getting better [Lying in bed] : lying in bed [] : no [FreeTextEntry1] : right shoulder.  [FreeTextEntry6] : clicking  [FreeTextEntry7] : to the elbow [de-identified] : movement

## 2023-05-24 NOTE — CONSULT LETTER
[Dear  ___] : Dear  [unfilled], [Consult Letter:] : I had the pleasure of evaluating your patient, [unfilled]. [Please see my note below.] : Please see my note below. [Consult Closing:] : Thank you very much for allowing me to participate in the care of this patient.  If you have any questions, please do not hesitate to contact me. [Sincerely,] : Sincerely, [FreeTextEntry3] : David Basurto M.D.\par Shoulder Surgery

## 2023-05-24 NOTE — PHYSICAL EXAM
[Moderate] : moderate [4 ___] : forward flexion 4[unfilled]/5 [5___] : external rotation 5[unfilled]/5 [] : no sensory deficits [Left] : left shoulder [Sitting] : sitting [FreeTextEntry9] : IR to T12. [TWNoteComboBox6] : internal rotation L3 [TWNoteComboBox4] : passive forward flexion 165 degrees [de-identified] : external rotation 90 degrees

## 2023-05-24 NOTE — ASSESSMENT
[FreeTextEntry1] : We discussed the underlying pathology. \par Treatment options reviewed. \par Will order right shoulder MRI. \par She will speak to her eye doctor regarding injections.\par Cautions discussed. \par Questions answered. \par \par Patient seen by David Hart M.D.\par Entered by Leonora Lee acting as scribe.

## 2023-05-24 NOTE — IMAGING
[Right] : right shoulder [FreeTextEntry1] : The GH joint is ok. There are AC changes.  [FreeTextEntry5] : There is a type I - II acromion.

## 2023-05-24 NOTE — REASON FOR VISIT
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 She has been treated on and off for years by Dr. Ring. Her left AC has been injection a few times. Reaching is painful. Night symptoms can occur. There is no n/t. NSAID use as needed with temporary relief. She had cataract surgery 5/23/23.

## 2023-05-31 ENCOUNTER — APPOINTMENT (OUTPATIENT)
Dept: MRI IMAGING | Facility: CLINIC | Age: 68
End: 2023-05-31
Payer: MEDICARE

## 2023-05-31 ENCOUNTER — OUTPATIENT (OUTPATIENT)
Dept: OUTPATIENT SERVICES | Facility: HOSPITAL | Age: 68
LOS: 1 days | End: 2023-05-31
Payer: MEDICARE

## 2023-05-31 DIAGNOSIS — Z98.84 BARIATRIC SURGERY STATUS: Chronic | ICD-10-CM

## 2023-05-31 DIAGNOSIS — Z98.890 OTHER SPECIFIED POSTPROCEDURAL STATES: Chronic | ICD-10-CM

## 2023-05-31 DIAGNOSIS — Z90.49 ACQUIRED ABSENCE OF OTHER SPECIFIED PARTS OF DIGESTIVE TRACT: Chronic | ICD-10-CM

## 2023-05-31 DIAGNOSIS — Z98.891 HISTORY OF UTERINE SCAR FROM PREVIOUS SURGERY: Chronic | ICD-10-CM

## 2023-05-31 DIAGNOSIS — Z96.652 PRESENCE OF LEFT ARTIFICIAL KNEE JOINT: Chronic | ICD-10-CM

## 2023-05-31 DIAGNOSIS — Z87.39 PERSONAL HISTORY OF OTHER DISEASES OF THE MUSCULOSKELETAL SYSTEM AND CONNECTIVE TISSUE: Chronic | ICD-10-CM

## 2023-05-31 DIAGNOSIS — Z87.19 PERSONAL HISTORY OF OTHER DISEASES OF THE DIGESTIVE SYSTEM: Chronic | ICD-10-CM

## 2023-05-31 DIAGNOSIS — M19.90 UNSPECIFIED OSTEOARTHRITIS, UNSPECIFIED SITE: ICD-10-CM

## 2023-05-31 PROCEDURE — 72148 MRI LUMBAR SPINE W/O DYE: CPT | Mod: MH

## 2023-05-31 PROCEDURE — 72148 MRI LUMBAR SPINE W/O DYE: CPT | Mod: 26,MH

## 2023-06-05 ENCOUNTER — APPOINTMENT (OUTPATIENT)
Dept: MRI IMAGING | Facility: CLINIC | Age: 68
End: 2023-06-05
Payer: MEDICARE

## 2023-06-05 ENCOUNTER — OUTPATIENT (OUTPATIENT)
Dept: OUTPATIENT SERVICES | Facility: HOSPITAL | Age: 68
LOS: 1 days | End: 2023-06-05
Payer: MEDICARE

## 2023-06-05 ENCOUNTER — RESULT REVIEW (OUTPATIENT)
Age: 68
End: 2023-06-05

## 2023-06-05 ENCOUNTER — APPOINTMENT (OUTPATIENT)
Dept: CT IMAGING | Facility: CLINIC | Age: 68
End: 2023-06-05
Payer: MEDICARE

## 2023-06-05 DIAGNOSIS — Z90.49 ACQUIRED ABSENCE OF OTHER SPECIFIED PARTS OF DIGESTIVE TRACT: Chronic | ICD-10-CM

## 2023-06-05 DIAGNOSIS — Z98.891 HISTORY OF UTERINE SCAR FROM PREVIOUS SURGERY: Chronic | ICD-10-CM

## 2023-06-05 DIAGNOSIS — Z87.39 PERSONAL HISTORY OF OTHER DISEASES OF THE MUSCULOSKELETAL SYSTEM AND CONNECTIVE TISSUE: Chronic | ICD-10-CM

## 2023-06-05 DIAGNOSIS — Z98.890 OTHER SPECIFIED POSTPROCEDURAL STATES: Chronic | ICD-10-CM

## 2023-06-05 DIAGNOSIS — Z87.19 PERSONAL HISTORY OF OTHER DISEASES OF THE DIGESTIVE SYSTEM: Chronic | ICD-10-CM

## 2023-06-05 DIAGNOSIS — Z00.8 ENCOUNTER FOR OTHER GENERAL EXAMINATION: ICD-10-CM

## 2023-06-05 DIAGNOSIS — Z96.652 PRESENCE OF LEFT ARTIFICIAL KNEE JOINT: Chronic | ICD-10-CM

## 2023-06-05 PROCEDURE — 73221 MRI JOINT UPR EXTREM W/O DYE: CPT | Mod: MH

## 2023-06-05 PROCEDURE — 74178 CT ABD&PLV WO CNTR FLWD CNTR: CPT | Mod: 26,MH

## 2023-06-05 PROCEDURE — 73221 MRI JOINT UPR EXTREM W/O DYE: CPT | Mod: 26,RT,MH

## 2023-06-05 PROCEDURE — 74178 CT ABD&PLV WO CNTR FLWD CNTR: CPT

## 2023-06-07 ENCOUNTER — APPOINTMENT (OUTPATIENT)
Dept: ORTHOPEDIC SURGERY | Facility: CLINIC | Age: 68
End: 2023-06-07
Payer: MEDICARE

## 2023-06-07 VITALS — HEIGHT: 63 IN | WEIGHT: 248 LBS | BODY MASS INDEX: 43.94 KG/M2

## 2023-06-07 PROCEDURE — 99214 OFFICE O/P EST MOD 30 MIN: CPT | Mod: 25

## 2023-06-07 PROCEDURE — 20611 DRAIN/INJ JOINT/BURSA W/US: CPT | Mod: RT

## 2023-06-07 NOTE — PHYSICAL EXAM
[Moderate] : moderate [4 ___] : forward flexion 4[unfilled]/5 [5___] : external rotation 5[unfilled]/5 [Left] : left shoulder [Sitting] : sitting [] : no sensory deficits [FreeTextEntry9] : IR to T12. [TWNoteComboBox6] : internal rotation L3 [TWNoteComboBox4] : passive forward flexion 165 degrees [de-identified] : external rotation 90 degrees

## 2023-06-07 NOTE — HISTORY OF PRESENT ILLNESS
[9] : 9 [4] : 4 [] : yes [Constant] : constant [Household chores] : household chores [Leisure] : leisure [Work] : work [Sleep] : sleep [Full time] : Work status: full time [de-identified] : Patient is here for Right shoulder MRI results. Pain is worsening. [FreeTextEntry1] : Right shoulder [FreeTextEntry6] : clicking [FreeTextEntry7] : to her elbow [de-identified] : movement [de-identified] : none

## 2023-06-07 NOTE — REASON FOR VISIT
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 She has been treated on and off for years by Dr. Ring. Her left AC has been injection a few times. Reaching is painful. Night symptoms can occur. There is no n/t. NSAID use as needed with temporary relief. She had cataract surgery 5/23/23.  Her pain is worse.

## 2023-06-07 NOTE — DATA REVIEWED
[FreeTextEntry1] : MRI [facility] 6/5/23 (no report): There is partial subscap tearing with biceps changes.  Supra tendinosis is noted.  Mild muscle changes are seen.  There is a GT cyst  AC arthritis is noted.

## 2023-06-07 NOTE — ASSESSMENT
[FreeTextEntry1] : We reviewed the findings.\par An injection and PT elected.\par Questions answered.\par \par Patient was seen by Dr. David Hart.\par Patient was seen by Giselle GUTIÉRREZ under the supervision of Dr. David Hart.\par Progress note was completed by Giselle GUTIÉRREZ.\par \par Procedure Name: Large Joint Injection / Aspiration: Depomedrol, Lidocaine and Guidance Ultrasound\par \par Large Joint Injection was performed because of pain and inflammation.\par Depomedrol: An injection of Depomedrol 40 mg , 2 cc.\par Lidocaine: An injection of Lidocaine 1 mg , 8 cc.\par \par Medication was injected in the right bicipital groove. Patient has tried OTC's including aspirin, Ibuprofen, Aleve etc or prescription NSAIDS, and/or exercises at home and/ or physical therapy without satisfactory response. The risks, benefits, and alternatives to steroid injection were explained in full to the patient. Risks outlined include but are not limited to infection, sepsis, bleeding, scarring, skin discoloration, temporary increase in pain, syncopal episode, failure to resolve symptoms, allergic reaction, symptom recurrence, and elevation of blood sugar in diabetics. Patient understood the risks. All questions were answered. After discussion, patient requested an injection. Oral informed consent was obtained.  Sterile preparation with betadine and aseptic technique was utilized for the procedure, including the preparation of the solutions used for the injection. Patient tolerated the procedure well.  \par Post Procedure Instructions: Patient was advised to call if redness, pain, or fever occur and apply ice for 15 min. out of every hour for the next 12-24 hours as tolerated. Patient was advised to rest the joint(s) for 3 days.  Advised to ice the injection site this evening.\par Ultrasound Guidance was used for the following reasons: for precise injection in area of tear. Visualization of the needle and placement of injection was performed without complication.

## 2023-06-19 ENCOUNTER — APPOINTMENT (OUTPATIENT)
Dept: RHEUMATOLOGY | Facility: CLINIC | Age: 68
End: 2023-06-19
Payer: MEDICARE

## 2023-06-19 VITALS
HEART RATE: 81 BPM | HEIGHT: 63 IN | WEIGHT: 254 LBS | DIASTOLIC BLOOD PRESSURE: 87 MMHG | BODY MASS INDEX: 45 KG/M2 | OXYGEN SATURATION: 96 % | SYSTOLIC BLOOD PRESSURE: 125 MMHG | TEMPERATURE: 208.22 F

## 2023-06-19 DIAGNOSIS — M25.473 EFFUSION, UNSPECIFIED ANKLE: ICD-10-CM

## 2023-06-19 DIAGNOSIS — M19.049 PRIMARY OSTEOARTHRITIS, UNSPECIFIED HAND: ICD-10-CM

## 2023-06-19 DIAGNOSIS — M25.562 PAIN IN LEFT KNEE: ICD-10-CM

## 2023-06-19 DIAGNOSIS — M25.549 PAIN IN JOINTS OF UNSPECIFIED HAND: ICD-10-CM

## 2023-06-19 PROCEDURE — 99204 OFFICE O/P NEW MOD 45 MIN: CPT

## 2023-06-20 PROBLEM — M19.049 OSTEOARTHRITIS, HAND: Status: ACTIVE | Noted: 2019-05-03

## 2023-06-20 NOTE — REVIEW OF SYSTEMS
[Dry Eyes] : dryness of the eyes [As Noted in HPI] : as noted in HPI [Arthralgias] : arthralgias [Joint Pain] : joint pain [Joint Swelling] : no joint swelling [Joint Stiffness] : joint stiffness [Negative] : Heme/Lymph

## 2023-06-20 NOTE — ASSESSMENT
[FreeTextEntry1] : 68F with known history of OA s/p L. knee replacement surgery, also pain in b/l hands including CMC joints, and ankles, previously saw Dr. Duff in 2019, here to re-establish care for polyarthritis. \par \par - will check ESR, CRP, and tests for RA, SLE, Sjogrens, although suspicion for inflammatory arthritis is very low due to lack of synovitis on exam and noted history of OA in multiple joints (CMC joints, as well as in knees s/p L knee replacement, along with obese body habitus, more consistent with OA). \par - if labs including ESR are normal, patient likely has OA.\par - treatment options are limited due to patient's history of gastric sleeve surgery- NSAIDs relatively contraindicated- she is taking ASA 81 mg QD since being found to have meningioma prevously- advised to follow up with the doctor managing this conditoin and see if she can be off of this medication if possible.\par - may consider duloxetine for OA treatment if other labs result negative; \par \par Follow up in 5 weeks.

## 2023-06-20 NOTE — HISTORY OF PRESENT ILLNESS
[FreeTextEntry1] : 68F with hx of OA, hx of gastric sleeve surgery in 2015, recently started on alendronate for osteoporosis 4/2023 but has not started the medication (managed by GYN), here to establish care. Was a previous patient of Dr. Duff, lost to followup since 2019. \par Per Dr. Duff prior progress note from 6/2019:\par "63 y/o woman with hx of anal CA, Left knee replacement 2018, gastric sleeve, who presents for pain in her back. She is being followed up for OA of her hands, and myalgias.\par \par Today, she has chief complaint of pain in mid back/left inner scapula area radiating to left arm to left 4th 5th fingers. The pain is 10/10 in intensity with 15-30 minutes. \par She had visit with orthopedic spine doctor today, but she was rescheduled. \par She had gone to urgent care previously for this problem a couple of weeks ago, received a shot in her arm, and also a rx for Prednisone 5 day course which was very helpful. \par \par She is not sure if tizanidine was very helpful, but is asking for a refill of it. \par She uses the voltaren gel left CMC, right medial epicondyle and she says it's helpful.\par She has had an MRI lumbar spine 2/9/19 shows degenerative Schmorl node, multiple levels of disc disease and degenerative change. \par She also gets swelling in feet/ankles. \par \par Diarrhea from anal CA vs. cholecystectomy. She finds cholestyramine helps"\par \par 6/19/23: \par Patient has pain in b/l knee and b/l ankle, also in b/l CMC joints. also has b/l shoulder pain, is in PT currently for r. shoulder pain from slight tear and biceps separation. L. shoulder has shoulder impingement.\par No wrist pain. Occasional R. elbow pain- possibly from biceps issue and R. shoudler. pain. \par knuckles appear swollen. \par \par For pain, she takes tylenol arthritis a few times per week. Doesn't usually take NSAID but was recently prescribed meloxicam by orthopedic doctor- took one last week. \par She is on a baby ASA since last year when she was found to have a meningioma. \par no rashes, oral or nasal ulcers. \par +Hair loss since COVID. No weight loss. No hematuria in past couple of months but recently saw urologist for difficulty urinating in 3/2023. Was advised it may have been a kidney stone. \par No Raynauds, no dysphagia, no skin thickening or tightening. +Dry eyes. no dyspnea apart from GIOVANNA on CPAP.  [Weight Loss] : no weight loss [Malar Facial Rash] : no malar facial rash [Skin Lesions] : no lesions [Skin Nodules] : no skin nodules [Oral Ulcers] : no oral ulcers [Dry Mouth] : no dry mouth [Dry Eyes] : dry eyes

## 2023-06-20 NOTE — PHYSICAL EXAM
[General Appearance - Alert] : alert [General Appearance - In No Acute Distress] : in no acute distress [Sclera] : the sclera and conjunctiva were normal [Extraocular Movements] : extraocular movements were intact [Outer Ear] : the ears and nose were normal in appearance [Exaggerated Use Of Accessory Muscles For Inspiration] : no accessory muscle use [Edema] : there was no peripheral edema [Skin Color & Pigmentation] : normal skin color and pigmentation [] : no rash [Skin Lesions] : no skin lesions [No Focal Deficits] : no focal deficits [Affect] : the affect was normal [Mood] : the mood was normal [FreeTextEntry1] : Tenderness in L. anterior shin distal to site of L. knee replacement; unable to internal/external rotate R. shoulder independently due to pain; otherwise b/l shoulder flexoin/abduction intact (pt shows great difficulty in doing R. shoulder actively, however). Normal ROM of b/l wrists; no synovitis notd in the joints; CMC joint tenderness noted mildly.

## 2023-06-26 ENCOUNTER — OUTPATIENT (OUTPATIENT)
Dept: OUTPATIENT SERVICES | Facility: HOSPITAL | Age: 68
LOS: 1 days | End: 2023-06-26
Payer: MEDICARE

## 2023-06-26 ENCOUNTER — APPOINTMENT (OUTPATIENT)
Dept: ULTRASOUND IMAGING | Facility: CLINIC | Age: 68
End: 2023-06-26
Payer: MEDICARE

## 2023-06-26 DIAGNOSIS — Z98.84 BARIATRIC SURGERY STATUS: Chronic | ICD-10-CM

## 2023-06-26 DIAGNOSIS — Z87.39 PERSONAL HISTORY OF OTHER DISEASES OF THE MUSCULOSKELETAL SYSTEM AND CONNECTIVE TISSUE: Chronic | ICD-10-CM

## 2023-06-26 DIAGNOSIS — Z98.890 OTHER SPECIFIED POSTPROCEDURAL STATES: Chronic | ICD-10-CM

## 2023-06-26 DIAGNOSIS — Z98.891 HISTORY OF UTERINE SCAR FROM PREVIOUS SURGERY: Chronic | ICD-10-CM

## 2023-06-26 DIAGNOSIS — M25.562 PAIN IN LEFT KNEE: ICD-10-CM

## 2023-06-26 DIAGNOSIS — Z96.652 PRESENCE OF LEFT ARTIFICIAL KNEE JOINT: Chronic | ICD-10-CM

## 2023-06-26 PROCEDURE — 76882 US LMTD JT/FCL EVL NVASC XTR: CPT

## 2023-06-26 PROCEDURE — 76882 US LMTD JT/FCL EVL NVASC XTR: CPT | Mod: 26,LT

## 2023-06-27 ENCOUNTER — OUTPATIENT (OUTPATIENT)
Dept: OUTPATIENT SERVICES | Facility: HOSPITAL | Age: 68
LOS: 1 days | End: 2023-06-27
Payer: MEDICARE

## 2023-06-27 ENCOUNTER — RESULT REVIEW (OUTPATIENT)
Age: 68
End: 2023-06-27

## 2023-06-27 ENCOUNTER — APPOINTMENT (OUTPATIENT)
Dept: ULTRASOUND IMAGING | Facility: CLINIC | Age: 68
End: 2023-06-27
Payer: MEDICARE

## 2023-06-27 DIAGNOSIS — M25.549 PAIN IN JOINTS OF UNSPECIFIED HAND: ICD-10-CM

## 2023-06-27 DIAGNOSIS — M25.473 EFFUSION, UNSPECIFIED ANKLE: ICD-10-CM

## 2023-06-27 DIAGNOSIS — Z87.39 PERSONAL HISTORY OF OTHER DISEASES OF THE MUSCULOSKELETAL SYSTEM AND CONNECTIVE TISSUE: Chronic | ICD-10-CM

## 2023-06-27 DIAGNOSIS — Z98.890 OTHER SPECIFIED POSTPROCEDURAL STATES: Chronic | ICD-10-CM

## 2023-06-27 DIAGNOSIS — Z90.49 ACQUIRED ABSENCE OF OTHER SPECIFIED PARTS OF DIGESTIVE TRACT: Chronic | ICD-10-CM

## 2023-06-27 DIAGNOSIS — Z98.84 BARIATRIC SURGERY STATUS: Chronic | ICD-10-CM

## 2023-06-27 DIAGNOSIS — Z96.652 PRESENCE OF LEFT ARTIFICIAL KNEE JOINT: Chronic | ICD-10-CM

## 2023-06-27 DIAGNOSIS — Z98.891 HISTORY OF UTERINE SCAR FROM PREVIOUS SURGERY: Chronic | ICD-10-CM

## 2023-06-27 PROCEDURE — 76882 US LMTD JT/FCL EVL NVASC XTR: CPT | Mod: 26,RT

## 2023-06-27 PROCEDURE — 76882 US LMTD JT/FCL EVL NVASC XTR: CPT

## 2023-06-27 PROCEDURE — 76882 US LMTD JT/FCL EVL NVASC XTR: CPT | Mod: 26,RT,76

## 2023-07-11 ENCOUNTER — NON-APPOINTMENT (OUTPATIENT)
Age: 68
End: 2023-07-11

## 2023-07-11 LAB
25(OH)D3 SERPL-MCNC: 34.8 NG/ML
ALBUMIN SERPL ELPH-MCNC: 4.3 G/DL
ALP BLD-CCNC: 104 U/L
ALT SERPL-CCNC: 18 U/L
ANA SER IF-ACNC: NEGATIVE
ANION GAP SERPL CALC-SCNC: 12 MMOL/L
AST SERPL-CCNC: 19 U/L
BILIRUB SERPL-MCNC: 0.6 MG/DL
BUN SERPL-MCNC: 15 MG/DL
CALCIUM SERPL-MCNC: 9.6 MG/DL
CCP AB SER IA-ACNC: <8 UNITS
CHLORIDE SERPL-SCNC: 106 MMOL/L
CO2 SERPL-SCNC: 23 MMOL/L
CREAT SERPL-MCNC: 0.78 MG/DL
CRP SERPL-MCNC: 9 MG/L
DSDNA AB SER-ACNC: 13 IU/ML
EGFR: 83 ML/MIN/1.73M2
ENA RNP AB SER IA-ACNC: <0.2 AL
ENA SM AB SER IA-ACNC: <0.2 AL
ENA SS-A AB SER IA-ACNC: <0.2 AL
ENA SS-B AB SER IA-ACNC: <0.2 AL
ERYTHROCYTE [SEDIMENTATION RATE] IN BLOOD BY WESTERGREN METHOD: 31 MM/HR
GLUCOSE SERPL-MCNC: 98 MG/DL
POTASSIUM SERPL-SCNC: 5 MMOL/L
PROT SERPL-MCNC: 6.4 G/DL
RF+CCP IGG SER-IMP: NEGATIVE
RHEUMATOID FACT SER QL: 14 IU/ML
SODIUM SERPL-SCNC: 140 MMOL/L

## 2023-07-17 ENCOUNTER — APPOINTMENT (OUTPATIENT)
Dept: RHEUMATOLOGY | Facility: CLINIC | Age: 68
End: 2023-07-17

## 2023-07-17 ENCOUNTER — NON-APPOINTMENT (OUTPATIENT)
Age: 68
End: 2023-07-17

## 2023-07-19 ENCOUNTER — APPOINTMENT (OUTPATIENT)
Dept: ORTHOPEDIC SURGERY | Facility: CLINIC | Age: 68
End: 2023-07-19
Payer: MEDICARE

## 2023-07-19 VITALS — HEIGHT: 63 IN | WEIGHT: 264 LBS | BODY MASS INDEX: 46.78 KG/M2

## 2023-07-19 PROCEDURE — 99214 OFFICE O/P EST MOD 30 MIN: CPT | Mod: 25

## 2023-07-19 PROCEDURE — 20610 DRAIN/INJ JOINT/BURSA W/O US: CPT | Mod: RT

## 2023-07-19 NOTE — ASSESSMENT
[FreeTextEntry1] : We reviewed her course. \par There are surgical options. \par We will continue with a nonoperative approach. \par PT will continue.\par An SA injection is planned today. \par Questions answered. \par \par Patient seen by David Hart M.D.\par Entered by Leonora Lee acting as scribe. \par \par \par Procedure Name: Large Joint Injection / Aspiration: Depomedrol, Lidocaine and Guidance Ultrasound\par \par Large Joint Injection was performed because of pain and inflammation.\par Depomedrol: An injection of Depomedrol 40 mg , 2 cc.\par Lidocaine: An injection of Lidocaine 1 mg , 13 cc.\par \par Medication was injected in the right subacromial space. Patient has tried OTC's including aspirin, Ibuprofen, Aleve etc or prescription NSAIDS, and/or exercises at home and/ or physical therapy without satisfactory response. The risks, benefits, and alternatives to steroid injection were explained in full to the patient. Risks outlined include but are not limited to infection, sepsis, bleeding, scarring, skin discoloration, temporary increase in pain, syncopal episode, failure to resolve symptoms, allergic reaction, symptom recurrence, and elevation of blood sugar in diabetics. Patient understood the risks. All questions were answered. After discussion, patient requested an injection. Oral informed consent was obtained.  Sterile preparation with betadine and aseptic technique was utilized for the procedure, including the preparation of the solutions used for the injection. Patient tolerated the procedure well.  \par Post Procedure Instructions: Patient was advised to call if redness, pain, or fever occur and apply ice for 15 min. out of every hour for the next 12-24 hours as tolerated. Patient was advised to rest the joint(s) for 3 days.  Advised to ice the injection site this evening.\par Ultrasound Guidance was used for the following reasons: for precise injection in area of tear. Visualization of the needle and placement of injection was performed without complication.

## 2023-07-19 NOTE — HISTORY OF PRESENT ILLNESS
[4] : 4 [0] : 0 [Dull/Aching] : dull/aching [] : yes [Intermittent] : intermittent [Household chores] : household chores [Leisure] : leisure [Work] : work [Sleep] : sleep [Rest] : rest [Heat] : heat [de-identified] : Patient is here for a follow up on her right shoulder. [FreeTextEntry1] : Right shoulder [FreeTextEntry7] : up to her neck and down to her forearm [FreeTextEntry9] : elizabeth [de-identified] : work [de-identified] : Physical therapy 2 x a week, HEP

## 2023-07-19 NOTE — REASON FOR VISIT
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 She has been treated on and off for years by Dr. Ring. Her left AC has been injection a few times. Reaching is painful. Night symptoms can occur. There is no n/t. NSAID use as needed with temporary relief. She had cataract surgery 5/23/23. The R bicipital injection 6/7/23 and PT helped. She feels 50% better.  Overall, she is improving.

## 2023-07-19 NOTE — PHYSICAL EXAM
[Moderate] : moderate [4 ___] : forward flexion 4[unfilled]/5 [5___] : external rotation 5[unfilled]/5 [Left] : left shoulder [Sitting] : sitting [Right] : right shoulder [] : no sensory deficits [FreeTextEntry9] : IR to T12. [TWNoteComboBox6] : internal rotation L3 [TWNoteComboBox4] : passive forward flexion 165 degrees [de-identified] : external rotation 90 degrees

## 2023-09-11 ENCOUNTER — APPOINTMENT (OUTPATIENT)
Dept: MRI IMAGING | Facility: CLINIC | Age: 68
End: 2023-09-11
Payer: MEDICARE

## 2023-09-11 ENCOUNTER — OUTPATIENT (OUTPATIENT)
Dept: OUTPATIENT SERVICES | Facility: HOSPITAL | Age: 68
LOS: 1 days | End: 2023-09-11
Payer: MEDICARE

## 2023-09-11 DIAGNOSIS — Z96.652 PRESENCE OF LEFT ARTIFICIAL KNEE JOINT: Chronic | ICD-10-CM

## 2023-09-11 DIAGNOSIS — Z87.39 PERSONAL HISTORY OF OTHER DISEASES OF THE MUSCULOSKELETAL SYSTEM AND CONNECTIVE TISSUE: Chronic | ICD-10-CM

## 2023-09-11 DIAGNOSIS — Z90.49 ACQUIRED ABSENCE OF OTHER SPECIFIED PARTS OF DIGESTIVE TRACT: Chronic | ICD-10-CM

## 2023-09-11 DIAGNOSIS — Z98.891 HISTORY OF UTERINE SCAR FROM PREVIOUS SURGERY: Chronic | ICD-10-CM

## 2023-09-11 DIAGNOSIS — Z87.19 PERSONAL HISTORY OF OTHER DISEASES OF THE DIGESTIVE SYSTEM: Chronic | ICD-10-CM

## 2023-09-11 DIAGNOSIS — Z98.84 BARIATRIC SURGERY STATUS: Chronic | ICD-10-CM

## 2023-09-11 DIAGNOSIS — D42.0 NEOPLASM OF UNCERTAIN BEHAVIOR OF CEREBRAL MENINGES: ICD-10-CM

## 2023-09-11 DIAGNOSIS — Z98.890 OTHER SPECIFIED POSTPROCEDURAL STATES: Chronic | ICD-10-CM

## 2023-09-11 PROCEDURE — A9585: CPT

## 2023-09-11 PROCEDURE — 70553 MRI BRAIN STEM W/O & W/DYE: CPT

## 2023-09-11 PROCEDURE — 70553 MRI BRAIN STEM W/O & W/DYE: CPT | Mod: 26,MH

## 2023-09-13 ENCOUNTER — APPOINTMENT (OUTPATIENT)
Dept: ORTHOPEDIC SURGERY | Facility: CLINIC | Age: 68
End: 2023-09-13
Payer: MEDICARE

## 2023-09-13 VITALS — WEIGHT: 250 LBS | HEIGHT: 63 IN | BODY MASS INDEX: 44.3 KG/M2

## 2023-09-13 PROCEDURE — 99214 OFFICE O/P EST MOD 30 MIN: CPT

## 2023-09-19 ENCOUNTER — APPOINTMENT (OUTPATIENT)
Dept: RADIATION ONCOLOGY | Facility: CLINIC | Age: 68
End: 2023-09-19
Payer: MEDICARE

## 2023-09-19 VITALS
TEMPERATURE: 96.3 F | SYSTOLIC BLOOD PRESSURE: 124 MMHG | HEART RATE: 75 BPM | WEIGHT: 255.07 LBS | HEIGHT: 63 IN | RESPIRATION RATE: 18 BRPM | DIASTOLIC BLOOD PRESSURE: 85 MMHG | BODY MASS INDEX: 45.2 KG/M2 | OXYGEN SATURATION: 99 %

## 2023-09-19 DIAGNOSIS — D42.0 NEOPLASM OF UNCERTAIN BEHAVIOR OF CEREBRAL MENINGES: ICD-10-CM

## 2023-09-19 PROCEDURE — 99213 OFFICE O/P EST LOW 20 MIN: CPT

## 2023-09-19 RX ORDER — MIRABEGRON 50 MG/1
TABLET, FILM COATED, EXTENDED RELEASE ORAL
Refills: 0 | Status: ACTIVE | COMMUNITY

## 2023-09-29 NOTE — ASU PATIENT PROFILE, ADULT - NUMBER OF YRS
Patient is on Dr Ramírez's NOV recall list for;   5 yr recall/history of polyps    Left  Message #1 for patient to call us back to schedule his Colonoscopy.   10

## 2023-10-23 NOTE — BRIEF OPERATIVE NOTE - NSICDXBRIEFOPLAUNCH_GEN_ALL_CORE
Aida MCNEIL EOS to CW's nurse, Joseline, on 10/23/2023    
To CW unable to route result note.  Please sign EOS, no FV scheduled, thank you!  
<--- Click to Launch ICDx for PreOp, PostOp and Procedure

## 2023-11-01 ENCOUNTER — APPOINTMENT (OUTPATIENT)
Dept: ORTHOPEDIC SURGERY | Facility: CLINIC | Age: 68
End: 2023-11-01
Payer: MEDICARE

## 2023-11-01 VITALS — HEIGHT: 63 IN | BODY MASS INDEX: 45.18 KG/M2 | WEIGHT: 255 LBS

## 2023-11-01 PROCEDURE — 99214 OFFICE O/P EST MOD 30 MIN: CPT

## 2023-11-01 RX ORDER — METHYLPREDNISOLONE 4 MG/1
4 TABLET ORAL
Qty: 1 | Refills: 0 | Status: ACTIVE | COMMUNITY
Start: 2023-11-01 | End: 1900-01-01

## 2023-12-11 ENCOUNTER — APPOINTMENT (OUTPATIENT)
Dept: ORTHOPEDIC SURGERY | Facility: CLINIC | Age: 68
End: 2023-12-11
Payer: MEDICARE

## 2023-12-11 ENCOUNTER — NON-APPOINTMENT (OUTPATIENT)
Age: 68
End: 2023-12-11

## 2023-12-11 VITALS
HEIGHT: 63 IN | DIASTOLIC BLOOD PRESSURE: 85 MMHG | HEART RATE: 76 BPM | BODY MASS INDEX: 43.94 KG/M2 | SYSTOLIC BLOOD PRESSURE: 133 MMHG | WEIGHT: 248 LBS

## 2023-12-11 PROCEDURE — L3670: CPT | Mod: RT

## 2023-12-11 PROCEDURE — 73562 X-RAY EXAM OF KNEE 3: CPT | Mod: RT

## 2023-12-11 PROCEDURE — 99214 OFFICE O/P EST MOD 30 MIN: CPT | Mod: 25

## 2023-12-11 PROCEDURE — 20610 DRAIN/INJ JOINT/BURSA W/O US: CPT | Mod: RT

## 2023-12-11 RX ORDER — METHYLPREDNISOLONE 4 MG/1
4 TABLET ORAL
Qty: 1 | Refills: 0 | Status: ACTIVE | COMMUNITY
Start: 2023-12-11 | End: 1900-01-01

## 2023-12-21 NOTE — DISCHARGE NOTE PROVIDER - NSTOBACCOUSAGEY/N_GEN_A_CS
Please contact your Oncologist if you have any questions regarding the IV fluids that you received today. You are instructed to call the office or go to the Emergency Dept. If you experience any of the following symptoms:    Dizziness/lightheadedness   Acute nausea or vomiting-not relieved by medications  Headaches-not relieved by medications  New chest pain or pressure  New rash /itching  New shortness of breath  Fever,chills or signs or symptoms of infection    Make sure you are drinking 48 to 64 ounces of water daily-if you are unable to drink fluids let us know right away.
No

## 2023-12-22 ENCOUNTER — APPOINTMENT (OUTPATIENT)
Dept: ORTHOPEDIC SURGERY | Facility: CLINIC | Age: 68
End: 2023-12-22
Payer: MEDICARE

## 2023-12-22 VITALS — BODY MASS INDEX: 43.94 KG/M2 | HEIGHT: 63 IN | WEIGHT: 248 LBS

## 2023-12-22 DIAGNOSIS — Z96.652 PRESENCE OF LEFT ARTIFICIAL KNEE JOINT: ICD-10-CM

## 2023-12-22 DIAGNOSIS — M17.11 UNILATERAL PRIMARY OSTEOARTHRITIS, RIGHT KNEE: ICD-10-CM

## 2023-12-22 DIAGNOSIS — M54.16 RADICULOPATHY, LUMBAR REGION: ICD-10-CM

## 2023-12-22 PROCEDURE — 73564 X-RAY EXAM KNEE 4 OR MORE: CPT | Mod: 50

## 2023-12-22 PROCEDURE — 99213 OFFICE O/P EST LOW 20 MIN: CPT

## 2023-12-22 NOTE — IMAGING
[Left] : left knee [Components well fixed, in good position] : Components well fixed, in good position [Right] : right knee [AP] : anteroposterior [Lateral] : lateral [Hernando] : skyline [AP Standing] : anteroposterior standing [Moderate tricompartmental OA medial narrowing] : Moderate tricompartmental OA medial narrowing [Advanced patellofemoral OA] : Advanced patellofemoral OA

## 2023-12-22 NOTE — PHYSICAL EXAM
[Left] : left knee [Right] : right knee [NL (140)] : flexion 140 degrees [NL (0)] : extension 0 degrees [] : ligamentously stable [TWNoteComboBox7] : flexion 120 degrees [de-identified] : extension 0 degrees

## 2023-12-22 NOTE — HISTORY OF PRESENT ILLNESS
[5] : 5 [4] : 4 [Localized] : localized [Intermittent] : intermittent [Household chores] : household chores [Nothing helps with pain getting better] : Nothing helps with pain getting better [Standing] : standing [Walking] : walking [Stairs] : stairs [de-identified] : 12/22/23 PT PRESENTS HERE TODAY WITH BL KNEES PAIN H/O LEFT TKA DONE  1991  BY DR MENDEZ. PAIN TO LEFT LEG FOR YEARS  PAIN IN KNEE WITH RADIATION TO LOWER LEG AT TIMES  HAD CSI RIGHT KNEE  2 WEEKS AGO WHICH HELPED. STILL PAIN. WAS TOLD MAY HAVE CYST. SOME RELIEF WITH VISCO IN PAST AS WELL.  PMHX RECTAL CARCINOMA WITH CHEMO AND RADIATION, NEUROPATHY, BASAL CELL CHEST HEMANGIOMA BRAIN WITH 2022 LABS 7/23 CRP 9, ESR 31 MRI IN 2021 NEG  [] : no [FreeTextEntry1] : [id number] [FreeTextEntry5] : NO INJURY  [de-identified] : DR MENDEZ [de-identified] : NOTHING

## 2023-12-22 NOTE — ASSESSMENT
[FreeTextEntry1] : H/O LT TKA BY DR. MENDEZ 1991. C/O LT LEG PAIN FOR YEARS WITH RADIATION OF PAIN DOWN THE LLE. XRAYS REVIEWED WITH COMPONENTS WELL FIXED. GOOD LIGAMENT BALANCE ON EXAM. NO SIGNS OF INFECTION. PAIN WORSENS WHEN LYING DOWN AND SITTING FOR PROLONGED PERIODS OF TIME. SHE WAS REASSURED. HER SYMPTOMS ARE RADICULAR IN NATURE.   ALSO, WITH MODERATE RT KNEE PAIN. HAD RT KNEE CSI 2 WEEKS AGO WITH SOME RELIEF. HAD SOME RELIEF FROM VISCO IN THE PAST. PAIN WORSENS WITH STAIRS AND WALKING PROLONGED DISTANCES. PAIN IS AFFECTING ADL AND FUNCTIONAL ACTIVITIES. XRAYS REVIEWED WITH ADVANCED PF OA. TREATMENT OPTIONS REVIEWED. QUESTIONS ANSWERED.   PMHx: RECTAL CARCINOMA WITH CHEMO AND RADIATION, NEUROPATHY, BASAL CELL CHEST HEMANGIOMA BRAIN WITH 2002 BLOOD WORK JULY 2023 CRP: 9 ESR: 31 MRI 2021 WNL.

## 2023-12-27 ENCOUNTER — OUTPATIENT (OUTPATIENT)
Dept: OUTPATIENT SERVICES | Facility: HOSPITAL | Age: 68
LOS: 1 days | End: 2023-12-27

## 2023-12-27 VITALS
TEMPERATURE: 98 F | HEART RATE: 67 BPM | WEIGHT: 250 LBS | DIASTOLIC BLOOD PRESSURE: 87 MMHG | SYSTOLIC BLOOD PRESSURE: 145 MMHG | HEIGHT: 61.25 IN | OXYGEN SATURATION: 97 % | RESPIRATION RATE: 18 BRPM

## 2023-12-27 DIAGNOSIS — Z87.39 PERSONAL HISTORY OF OTHER DISEASES OF THE MUSCULOSKELETAL SYSTEM AND CONNECTIVE TISSUE: ICD-10-CM

## 2023-12-27 DIAGNOSIS — Z98.890 OTHER SPECIFIED POSTPROCEDURAL STATES: Chronic | ICD-10-CM

## 2023-12-27 DIAGNOSIS — Z96.652 PRESENCE OF LEFT ARTIFICIAL KNEE JOINT: Chronic | ICD-10-CM

## 2023-12-27 DIAGNOSIS — Z87.39 PERSONAL HISTORY OF OTHER DISEASES OF THE MUSCULOSKELETAL SYSTEM AND CONNECTIVE TISSUE: Chronic | ICD-10-CM

## 2023-12-27 DIAGNOSIS — G47.33 OBSTRUCTIVE SLEEP APNEA (ADULT) (PEDIATRIC): ICD-10-CM

## 2023-12-27 DIAGNOSIS — M75.111 INCOMPLETE ROTATOR CUFF TEAR OR RUPTURE OF RIGHT SHOULDER, NOT SPECIFIED AS TRAUMATIC: ICD-10-CM

## 2023-12-27 DIAGNOSIS — E03.9 HYPOTHYROIDISM, UNSPECIFIED: ICD-10-CM

## 2023-12-27 DIAGNOSIS — Z98.84 BARIATRIC SURGERY STATUS: Chronic | ICD-10-CM

## 2023-12-27 DIAGNOSIS — Z90.49 ACQUIRED ABSENCE OF OTHER SPECIFIED PARTS OF DIGESTIVE TRACT: Chronic | ICD-10-CM

## 2023-12-27 DIAGNOSIS — Z87.19 PERSONAL HISTORY OF OTHER DISEASES OF THE DIGESTIVE SYSTEM: Chronic | ICD-10-CM

## 2023-12-27 DIAGNOSIS — Z98.891 HISTORY OF UTERINE SCAR FROM PREVIOUS SURGERY: Chronic | ICD-10-CM

## 2023-12-27 LAB
HCT VFR BLD CALC: 37 % — SIGNIFICANT CHANGE UP (ref 34.5–45)
HCT VFR BLD CALC: 37 % — SIGNIFICANT CHANGE UP (ref 34.5–45)
HGB BLD-MCNC: 12.1 G/DL — SIGNIFICANT CHANGE UP (ref 11.5–15.5)
HGB BLD-MCNC: 12.1 G/DL — SIGNIFICANT CHANGE UP (ref 11.5–15.5)
MCHC RBC-ENTMCNC: 26.5 PG — LOW (ref 27–34)
MCHC RBC-ENTMCNC: 26.5 PG — LOW (ref 27–34)
MCHC RBC-ENTMCNC: 32.7 GM/DL — SIGNIFICANT CHANGE UP (ref 32–36)
MCHC RBC-ENTMCNC: 32.7 GM/DL — SIGNIFICANT CHANGE UP (ref 32–36)
MCV RBC AUTO: 81.1 FL — SIGNIFICANT CHANGE UP (ref 80–100)
MCV RBC AUTO: 81.1 FL — SIGNIFICANT CHANGE UP (ref 80–100)
NRBC # BLD: 0 /100 WBCS — SIGNIFICANT CHANGE UP (ref 0–0)
NRBC # BLD: 0 /100 WBCS — SIGNIFICANT CHANGE UP (ref 0–0)
NRBC # FLD: 0 K/UL — SIGNIFICANT CHANGE UP (ref 0–0)
NRBC # FLD: 0 K/UL — SIGNIFICANT CHANGE UP (ref 0–0)
PLATELET # BLD AUTO: 168 K/UL — SIGNIFICANT CHANGE UP (ref 150–400)
PLATELET # BLD AUTO: 168 K/UL — SIGNIFICANT CHANGE UP (ref 150–400)
RBC # BLD: 4.56 M/UL — SIGNIFICANT CHANGE UP (ref 3.8–5.2)
RBC # BLD: 4.56 M/UL — SIGNIFICANT CHANGE UP (ref 3.8–5.2)
RBC # FLD: 14.4 % — SIGNIFICANT CHANGE UP (ref 10.3–14.5)
RBC # FLD: 14.4 % — SIGNIFICANT CHANGE UP (ref 10.3–14.5)
WBC # BLD: 4.62 K/UL — SIGNIFICANT CHANGE UP (ref 3.8–10.5)
WBC # BLD: 4.62 K/UL — SIGNIFICANT CHANGE UP (ref 3.8–10.5)
WBC # FLD AUTO: 4.62 K/UL — SIGNIFICANT CHANGE UP (ref 3.8–10.5)
WBC # FLD AUTO: 4.62 K/UL — SIGNIFICANT CHANGE UP (ref 3.8–10.5)

## 2023-12-27 RX ORDER — CHOLECALCIFEROL (VITAMIN D3) 125 MCG
0 CAPSULE ORAL
Qty: 0 | Refills: 0 | DISCHARGE

## 2023-12-27 RX ORDER — TIZANIDINE 4 MG/1
2 TABLET ORAL
Qty: 0 | Refills: 0 | DISCHARGE

## 2023-12-27 RX ORDER — PHENTERMINE HCL 30 MG
1 CAPSULE ORAL
Qty: 0 | Refills: 0 | DISCHARGE

## 2023-12-27 RX ORDER — PREGABALIN 225 MG/1
0 CAPSULE ORAL
Qty: 0 | Refills: 0 | DISCHARGE

## 2023-12-27 NOTE — H&P PST ADULT - NEUROLOGICAL COMMENTS
Sleep disorder breathing Occasional Ha's, history of resection of brain meningioma & XRT  May 2022 followed by neuro

## 2023-12-27 NOTE — H&P PST ADULT - PROBLEM SELECTOR PLAN 1
Scheduled for right shoulder arthroscopy, debridement, biceps tenodesis, subscapularis repair, subacromial decompression with Dr. Hart 01/09/2023.  Verbal and written pre-op instructions provided to patient. Patient verbalized understanding and will call surgeons office for revised instructions if surgery is rescheduled.   Continue Omperazole for GI prophylaxis.   patient given verbal and written instruction with teach back on chlorhexidine shampoo, and the patient verbalized understanding with return demonstration.

## 2023-12-27 NOTE — H&P PST ADULT - NSICDXPASTSURGICALHX_GEN_ALL_CORE_FT
PAST SURGICAL HISTORY:  H/O hiatal hernia     H/O total knee replacement, left     H/O umbilical hernia repair     History of  section X2    .    History of resection of meningioma     History of trigger finger right    S/P cholecystectomy     S/P left knee arthroscopy     Status post endoscopy     Status post gastric banding band removed and sleeve procedure done

## 2023-12-27 NOTE — H&P PST ADULT - ATTENDING COMMENTS
normal...
To the best of my ability as an orthopaedic surgeon, I assert this.  There are no changes orthopaedically.  Otherwise as per anesthesia and/or medicine.

## 2023-12-27 NOTE — H&P PST ADULT - ASSESSMENT
67 yo female scheduled for right shoulder arthroscopy, debridement, biceps tenodesis, subscapularis repair, subacromial decompression with Dr. Hart.     69 yo female scheduled for right shoulder arthroscopy, debridement, biceps tenodesis, subscapularis repair, subacromial decompression with Dr. Hart.

## 2023-12-27 NOTE — H&P PST ADULT - HISTORY OF PRESENT ILLNESS
bed to chair/5 feet 69 y/o female with H/O: Hypothyroidism, GERD, Obesity presents for pre op evaluation with h/o right rotator cuff tear scheduled for right shoulder arthroscopy, debridement, biceps tenodesis, subscapularis repair, subacromial decompression with Dr. Hart.

## 2023-12-27 NOTE — H&P PST ADULT - NSICDXPASTMEDICALHX_GEN_ALL_CORE_FT
PAST MEDICAL HISTORY:  Arthritis     Degenerative disc disease, lumbar     Gastroesophageal reflux disease, esophagitis presence not specified     History of chemotherapy     History of radiation therapy     Knee pain, bilateral left worse    Lung nodule     Obesity     Rectal cancer 2016 chemo radiation    S/P bariatric surgery sleeve gastrectomy    Sleep apnea cpap  pressure at 7    Thrombocytopenia while on chemo    Vitamin D deficiency

## 2024-01-04 ENCOUNTER — NON-APPOINTMENT (OUTPATIENT)
Age: 69
End: 2024-01-04

## 2024-01-05 RX ORDER — HYDROCODONE BITARTRATE AND ACETAMINOPHEN 7.5; 325 MG/1; MG/1
7.5-325 TABLET ORAL
Qty: 42 | Refills: 0 | Status: ACTIVE | COMMUNITY
Start: 2024-01-03 | End: 1900-01-01

## 2024-01-05 RX ORDER — GABAPENTIN 300 MG/1
300 CAPSULE ORAL 3 TIMES DAILY
Qty: 15 | Refills: 0 | Status: ACTIVE | COMMUNITY
Start: 2024-01-03 | End: 1900-01-01

## 2024-01-08 ENCOUNTER — TRANSCRIPTION ENCOUNTER (OUTPATIENT)
Age: 69
End: 2024-01-08

## 2024-01-08 VITALS
RESPIRATION RATE: 18 BRPM | HEIGHT: 61.25 IN | OXYGEN SATURATION: 97 % | WEIGHT: 250 LBS | HEART RATE: 85 BPM | DIASTOLIC BLOOD PRESSURE: 84 MMHG | SYSTOLIC BLOOD PRESSURE: 163 MMHG | TEMPERATURE: 98 F

## 2024-01-08 RX ORDER — KETOROLAC TROMETHAMINE 10 MG/1
10 TABLET, FILM COATED ORAL EVERY 6 HOURS
Qty: 20 | Refills: 0 | Status: ACTIVE | COMMUNITY
Start: 2024-01-03 | End: 1900-01-01

## 2024-01-08 NOTE — ASU PREOPERATIVE ASSESSMENT, ADULT (IPARK ONLY) - FALL HARM RISK - UNIVERSAL INTERVENTIONS
Bed in lowest position, wheels locked, appropriate side rails in place/Call bell, personal items and telephone in reach/Instruct patient to call for assistance before getting out of bed or chair/Non-slip footwear when patient is out of bed/[place] to call system/Physically safe environment - no spills, clutter or unnecessary equipment/Purposeful Proactive Rounding/Room/bathroom lighting operational, light cord in reach Bed in lowest position, wheels locked, appropriate side rails in place/Call bell, personal items and telephone in reach/Instruct patient to call for assistance before getting out of bed or chair/Non-slip footwear when patient is out of bed/[place] to call system/Physically safe environment - no spills, clutter or unnecessary equipment/Purposeful Proactive Rounding/Room/bathroom lighting operational, light cord in reach

## 2024-01-08 NOTE — ASU PREOPERATIVE ASSESSMENT, ADULT (IPARK ONLY) - INV PERIPH IV DEVICE
D/c instructions given to pt, medication education x 2 new rx given. Extensive safety education given. Pt verbalizes understanding. No medication reaction this visit. Pt alert, oriented, stable and ambulatory with steady gait for d/c.
Angiocath

## 2024-01-09 ENCOUNTER — OUTPATIENT (OUTPATIENT)
Dept: OUTPATIENT SERVICES | Facility: HOSPITAL | Age: 69
LOS: 1 days | Discharge: HOME | End: 2024-01-09

## 2024-01-09 ENCOUNTER — APPOINTMENT (OUTPATIENT)
Dept: ORTHOPEDIC SURGERY | Facility: AMBULATORY SURGERY CENTER | Age: 69
End: 2024-01-09
Payer: MEDICARE

## 2024-01-09 ENCOUNTER — TRANSCRIPTION ENCOUNTER (OUTPATIENT)
Age: 69
End: 2024-01-09

## 2024-01-09 VITALS
HEART RATE: 79 BPM | TEMPERATURE: 99 F | SYSTOLIC BLOOD PRESSURE: 126 MMHG | OXYGEN SATURATION: 96 % | DIASTOLIC BLOOD PRESSURE: 70 MMHG | RESPIRATION RATE: 16 BRPM

## 2024-01-09 DIAGNOSIS — Z87.39 PERSONAL HISTORY OF OTHER DISEASES OF THE MUSCULOSKELETAL SYSTEM AND CONNECTIVE TISSUE: Chronic | ICD-10-CM

## 2024-01-09 DIAGNOSIS — Z90.49 ACQUIRED ABSENCE OF OTHER SPECIFIED PARTS OF DIGESTIVE TRACT: Chronic | ICD-10-CM

## 2024-01-09 DIAGNOSIS — Z98.890 OTHER SPECIFIED POSTPROCEDURAL STATES: Chronic | ICD-10-CM

## 2024-01-09 DIAGNOSIS — Z96.652 PRESENCE OF LEFT ARTIFICIAL KNEE JOINT: Chronic | ICD-10-CM

## 2024-01-09 DIAGNOSIS — Z98.84 BARIATRIC SURGERY STATUS: Chronic | ICD-10-CM

## 2024-01-09 DIAGNOSIS — Z98.891 HISTORY OF UTERINE SCAR FROM PREVIOUS SURGERY: Chronic | ICD-10-CM

## 2024-01-09 DIAGNOSIS — M75.111 INCOMPLETE ROTATOR CUFF TEAR OR RUPTURE OF RIGHT SHOULDER, NOT SPECIFIED AS TRAUMATIC: ICD-10-CM

## 2024-01-09 DIAGNOSIS — Z87.19 PERSONAL HISTORY OF OTHER DISEASES OF THE DIGESTIVE SYSTEM: Chronic | ICD-10-CM

## 2024-01-09 PROCEDURE — 29826 SHO ARTHRS SRG DECOMPRESSION: CPT | Mod: RT

## 2024-01-09 PROCEDURE — 29821 SHO ARTHRS SRG COMPL SYNVCT: CPT | Mod: 59,RT

## 2024-01-09 PROCEDURE — 29823 SHO ARTHRS SRG XTNSV DBRDMT: CPT | Mod: 59,RT

## 2024-01-09 PROCEDURE — 29827 SHO ARTHRS SRG RT8TR CUF RPR: CPT | Mod: RT

## 2024-01-09 PROCEDURE — 29824 SHO ARTHRS SRG DSTL CLAVICLC: CPT | Mod: 59,RT

## 2024-01-09 PROCEDURE — 23405 INCISION OF TENDON & MUSCLE: CPT | Mod: 59,RT

## 2024-01-09 RX ORDER — ALUMINUM ZIRCONIUM TRICHLOROHYDREX GLY 0.2 G/G
1 STICK TOPICAL
Refills: 0 | DISCHARGE

## 2024-01-09 RX ORDER — OMEPRAZOLE 10 MG/1
1 CAPSULE, DELAYED RELEASE ORAL
Qty: 0 | Refills: 0 | DISCHARGE

## 2024-01-09 RX ORDER — OXYBUTYNIN CHLORIDE 5 MG
1 TABLET ORAL
Refills: 0 | DISCHARGE

## 2024-01-09 RX ORDER — MULTIVIT-MIN/FERROUS GLUCONATE 9 MG/15 ML
1 LIQUID (ML) ORAL
Refills: 0 | DISCHARGE

## 2024-01-09 RX ORDER — LEVOTHYROXINE SODIUM 125 MCG
1 TABLET ORAL
Qty: 0 | Refills: 0 | DISCHARGE

## 2024-01-09 RX ORDER — ROSUVASTATIN CALCIUM 5 MG/1
1 TABLET ORAL
Refills: 0 | DISCHARGE

## 2024-01-09 RX ORDER — ACYCLOVIR SODIUM 500 MG
1 VIAL (EA) INTRAVENOUS
Refills: 0 | DISCHARGE

## 2024-01-09 RX ORDER — FLUTICASONE PROPIONATE 50 MCG
1 SPRAY, SUSPENSION NASAL
Refills: 0 | DISCHARGE

## 2024-01-09 RX ORDER — MELOXICAM 15 MG/1
1 TABLET ORAL
Refills: 0 | DISCHARGE

## 2024-01-09 NOTE — ASU DISCHARGE PLAN (ADULT/PEDIATRIC) - NS MD DC FALL RISK RISK
For information on Fall & Injury Prevention, visit: [email or web address] OR  [email or web address] OR  https://www.cdc.gov/steadi/patient.html For information on Fall & Injury Prevention, visit: [email or web address]/[email or web address]-[email or web address]-mobility OR  [email or web address]/[email or web address]-[email or web address]-tips-to-avoid-injury OR  https://www.cdc.gov/steadi/patient.html

## 2024-01-09 NOTE — BRIEF OPERATIVE NOTE - OPERATION/FINDINGS
R shoulder arthroscopic debridement, rotator cuff repair, biceps tenotomy, acromioplasty and distal clavicle resection

## 2024-01-09 NOTE — ASU PREOP CHECKLIST - SURGICAL CONSENT
Fluence: 32 Treatment Number: 3 Number Of Passes: 1 Render Post Care In The Note?: yes Overlap: 10% Fluence Units: J/cm2 Total Pulses (Will Not Render If Blank): 221 Pulse Duration (In Milliseconds): 15 Detail Level: Zone Pre-Procedure Text: The patients skin was cleaned and Lux lotion applied. Fluence: 40 Price (Use Numbers Only, No Special Characters Or $): 854 Treated Area: large area Anesthesia Volume In Cc: 0 Location Override: 79 Consent: Written consent obtained, risks reviewed including but not limited to crusting, scabbing, blistering, scarring, darker or lighter pigmentary change, bruising, and/or incomplete response. Location Override: 110 External Cooling: Baldev Cryo 6 Post-Care Instructions: I reviewed with the patient in detail post-care instructions. Patient should stay away from the sun and wear sun protection until treated areas are fully healed. Fluence: 32 External Cooling Fan Speed: 5 done

## 2024-01-09 NOTE — ASU PREOP CHECKLIST - WARM FLUIDS/WARM BLANKETS
In the next few weeks you may receive a Press Ganey survey regarding your most recent clinic visit with us.  Please take a few moments to accurately evaluate your visit.  We strive for excellence and value your feedback.       If we need to contact you regarding any test results, we will make 2 attempts to reach you at the number you have listed during your office visit today.  If we are unable to reach you, a letter with your results and any further instructions will be mailed to you home.    Please do not hesitate to call our office with any questions or concerns at Dept: [phone].  
no

## 2024-01-09 NOTE — ASU DISCHARGE PLAN (ADULT/PEDIATRIC) - ASU DC SPECIAL INSTRUCTIONSFT
Please see handout from Dr. Hart for specific instructions including follow up. If you are supposed to take any specific medications postoperatively, they have already been sent to your pharmacy.

## 2024-01-09 NOTE — ASU DISCHARGE PLAN (ADULT/PEDIATRIC) - CARE PROVIDER_API CALL
David Hart  Orthopaedic Surgery  [address] [place] [id number]  Phone: [phone]  Fax: [phone]  Follow Up Time: 1 week   David Hart  Orthopaedic Surgery  [address] [place] [id number]  Phone: [phone]  Fax: [phone]  Follow Up Time: 1 week

## 2024-01-09 NOTE — ASU DISCHARGE PLAN (ADULT/PEDIATRIC) - PROCEDURE
Right Shoulder Arthroscopic Rotator Cuff Repair, Biceps Tenotomy, Acromioplasty and Distal Clavicle Resection

## 2024-01-09 NOTE — ASU DISCHARGE PLAN (ADULT/PEDIATRIC) - FOLLOW UP APPOINTMENTS
[phone] [facility] Medicine ([facility]) [facility] ([facility])

## 2024-01-17 ENCOUNTER — APPOINTMENT (OUTPATIENT)
Dept: ORTHOPEDIC SURGERY | Facility: CLINIC | Age: 69
End: 2024-01-17
Payer: MEDICARE

## 2024-01-17 VITALS — WEIGHT: 248 LBS | BODY MASS INDEX: 43.94 KG/M2 | HEIGHT: 63 IN

## 2024-01-17 PROCEDURE — 73030 X-RAY EXAM OF SHOULDER: CPT | Mod: RT

## 2024-01-17 PROCEDURE — 99024 POSTOP FOLLOW-UP VISIT: CPT

## 2024-01-17 RX ORDER — TRAMADOL HYDROCHLORIDE 50 MG/1
50 TABLET, COATED ORAL
Qty: 30 | Refills: 0 | Status: ACTIVE | COMMUNITY
Start: 2024-01-17 | End: 1900-01-01

## 2024-01-17 RX ORDER — METHYLPREDNISOLONE 4 MG/1
4 TABLET ORAL
Qty: 1 | Refills: 0 | Status: ACTIVE | COMMUNITY
Start: 2024-01-17 | End: 1900-01-01

## 2024-01-17 NOTE — REASON FOR VISIT
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  DOS: 1/9/2024 Procedure: Right Shoulder Arthroscopy, Glenohumeral Debridement, Synovectomy, Biceps Tenotomy, Rotator Cuff Repair, Subacromial Decompression, Distal Clavicle Resection Diagnosis: Partial Subscapularis Tear, Biceps Tendinitis, Partial Supraspinatus Tear, Subacromial Impingement, Shoulder Pain, Glenohumeral Synovitis, Glenohumeral Chondrosis, SLAP Tear, Partial Anterior Labral Tear, AC Arthralgia  No f/c/s.  She started PT and had one session.  The narcotic made her dizzy.  She finished the Gabapentin and Ketorolac.  Her daughter is here - she is an RN and has been helping with the recovery.

## 2024-01-17 NOTE — PHYSICAL EXAM
[Right] : right shoulder [] : no sensory deficits [FreeTextEntry9] : Range of motion was not assessed. [de-identified] : Strength was not assessed.

## 2024-01-17 NOTE — HISTORY OF PRESENT ILLNESS
[] : Post Surgical Visit: yes [0] : 0 [de-identified] : 1st PO right shoulder [FreeTextEntry1] : right shoulder [de-identified] : 01/09/2024

## 2024-01-17 NOTE — ASSESSMENT
[FreeTextEntry1] : We reviewed the scope pictures. PT outlined. Table slides demonstrated. Sling use outlined and the abduction pillow was removed. Questions answered. Tramadol sent. We will follow her wrist for now. Medrol prescribed.  Patient was seen by Dr. David Hart. Patient was seen by Giselle GUTIÉRREZ under the supervision of Dr. David Hart. Progress note was completed by Giselle GUTIÉRREZ.

## 2024-01-30 ENCOUNTER — NON-APPOINTMENT (OUTPATIENT)
Age: 69
End: 2024-01-30

## 2024-02-22 ENCOUNTER — TRANSCRIPTION ENCOUNTER (OUTPATIENT)
Age: 69
End: 2024-02-22

## 2024-02-28 ENCOUNTER — APPOINTMENT (OUTPATIENT)
Dept: ORTHOPEDIC SURGERY | Facility: CLINIC | Age: 69
End: 2024-02-28
Payer: MEDICARE

## 2024-02-28 ENCOUNTER — APPOINTMENT (OUTPATIENT)
Dept: ORTHOPEDIC SURGERY | Facility: CLINIC | Age: 69
End: 2024-02-28

## 2024-02-28 VITALS — HEIGHT: 63 IN | BODY MASS INDEX: 43.94 KG/M2 | WEIGHT: 248 LBS

## 2024-02-28 DIAGNOSIS — M75.41 IMPINGEMENT SYNDROME OF RIGHT SHOULDER: ICD-10-CM

## 2024-02-28 PROCEDURE — 73030 X-RAY EXAM OF SHOULDER: CPT | Mod: RT

## 2024-02-28 PROCEDURE — 99024 POSTOP FOLLOW-UP VISIT: CPT

## 2024-02-28 NOTE — ASSESSMENT
[FreeTextEntry1] : We discussed these issues. We will follow for now her trauma. She appears to be on target. Cont PT and HEP advised. Sling and driving d/w them. Questions answered. Caution discussed.  Patient was seen by Dr. David Hart. Patient was seen by Giselle GUTIÉRREZ under the supervision of Dr. David Hart. Progress note was completed by Giselle GUTIÉRREZ. Entered by Leonora Lee acting as scribe.

## 2024-02-28 NOTE — REASON FOR VISIT
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  DOS: 1/9/2024 Procedure: Right Shoulder Arthroscopy, Glenohumeral Debridement, Synovectomy, Biceps Tenotomy, Rotator Cuff Repair, Subacromial Decompression, Distal Clavicle Resection Diagnosis: Partial Subscapularis Tear, Biceps Tendinitis, Partial Supraspinatus Tear, Subacromial Impingement, Shoulder Pain, Glenohumeral Synovitis, Glenohumeral Chondrosis, SLAP Tear, Partial Anterior Labral Tear, AC Arthralgia  She fell while in her sling on 2/22.  She went to PT the following day, with pain.  She is doing OK.  She took Mobic a few times, though has limited it s/t gastric surgery.  No numbness.  She did not take the tramadol.  Her daughter is here.

## 2024-02-28 NOTE — HISTORY OF PRESENT ILLNESS
[5] : 5 [4] : 4 [] : yes [Dull/Aching] : dull/aching [Constant] : constant [de-identified] : Post op right shoulder, DOS 1/9/2024. [FreeTextEntry1] : Right shoulder [de-identified] : sling [FreeTextEntry7] : scapula, down to her elbow and forearm [de-identified] : Physical therapy 2 x a week, HEP

## 2024-02-28 NOTE — PHYSICAL EXAM
[Right] : right shoulder [Mild] : mild [Supine] : supine [Moderate] : moderate [] : no sensory deficits [de-identified] : Strength was not assessed. [de-identified] : +arc [TWNoteComboBox4] : passive forward flexion 120 degrees [de-identified] : external rotation 45 degrees

## 2024-02-28 NOTE — HISTORY OF PRESENT ILLNESS
[de-identified] : Post op right shoulder, DOS 1/9/2024. [5] : 5 [4] : 4 [Dull/Aching] : dull/aching [] : yes [Constant] : constant [FreeTextEntry7] : scapula, down to her elbow and forearm [FreeTextEntry1] : Right shoulder [de-identified] : Physical therapy 2 x a week, HEP [de-identified] : sling

## 2024-02-28 NOTE — PHYSICAL EXAM
[Right] : right shoulder [Supine] : supine [Moderate] : moderate [Mild] : mild [] : no sensory deficits [de-identified] : Strength was not assessed. [de-identified] : +arc [TWNoteComboBox4] : passive forward flexion 120 degrees [de-identified] : external rotation 45 degrees

## 2024-04-01 NOTE — ADDENDUM
[FreeTextEntry1] : Documented by Dragan Neville acting as a scribe for Dr. Bob hAuja on 06/18/2019 . All medical record entries made by the Scribe were at my, Dr. Bob Ahuja, direction and personally dictated by me on 06/18/2019 . I have reviewed the chart and agree that the record accurately reflects my personal performance of the history, physical exam, assessment and plan. I have also personally directed, reviewed, and agreed with the chart. 
Patent

## 2024-04-24 ENCOUNTER — APPOINTMENT (OUTPATIENT)
Dept: ORTHOPEDIC SURGERY | Facility: CLINIC | Age: 69
End: 2024-04-24
Payer: MEDICARE

## 2024-04-24 VITALS — HEIGHT: 63 IN | WEIGHT: 256 LBS | BODY MASS INDEX: 45.36 KG/M2

## 2024-04-24 DIAGNOSIS — M75.111 INCOMPLETE ROTATOR CUFF TEAR OR RUPTURE OF RIGHT SHOULDER, NOT SPECIFIED AS TRAUMATIC: ICD-10-CM

## 2024-04-24 DIAGNOSIS — M75.21 BICIPITAL TENDINITIS, RIGHT SHOULDER: ICD-10-CM

## 2024-04-24 PROCEDURE — 99214 OFFICE O/P EST MOD 30 MIN: CPT

## 2024-04-24 RX ORDER — METHYLPREDNISOLONE 4 MG/1
4 TABLET ORAL
Qty: 1 | Refills: 0 | Status: ACTIVE | COMMUNITY
Start: 2024-04-24 | End: 1900-01-01

## 2024-04-24 RX ORDER — MELOXICAM 15 MG/1
15 TABLET ORAL
Qty: 30 | Refills: 0 | Status: ACTIVE | COMMUNITY
Start: 2024-04-24 | End: 1900-01-01

## 2024-04-24 NOTE — REASON FOR VISIT
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  DOS: 1/9/2024 Procedure: Right Shoulder Arthroscopy, Glenohumeral Debridement, Synovectomy, Biceps Tenotomy, Rotator Cuff Repair, Subacromial Decompression, Distal Clavicle Resection Diagnosis: Partial Subscapularis Tear, Biceps Tendinitis, Partial Supraspinatus Tear, Subacromial Impingement, Shoulder Pain, Glenohumeral Synovitis, Glenohumeral Chondrosis, SLAP Tear, Partial Anterior Labral Tear, AC Arthralgia  She is still in PT and has made gains. She feels her ROM is improving.

## 2024-04-24 NOTE — HISTORY OF PRESENT ILLNESS
[3] : 3 [Dull/Aching] : dull/aching [Tightness] : tightness [] : yes [Constant] : constant [Heat] : heat [Part time] : Work status: part time [de-identified] : Patient is here for a follow up on her right shoulder. Pain has improved. [FreeTextEntry1] : Right shoulder [FreeTextEntry7] : to her forearm [de-identified] : Physical therapy 2 x a week, HEP

## 2024-04-24 NOTE — PHYSICAL EXAM
[Right] : right shoulder [Moderate] : moderate [Mild] : mild [Sitting] : sitting [4 ___] : forward flexion 4[unfilled]/5 [] : no sensory deficits [de-identified] : +arc [TWNoteComboBox4] : passive forward flexion 150 degrees [TWNoteComboBox6] : internal rotation L2 [de-identified] : external rotation 60 degrees

## 2024-04-24 NOTE — ASSESSMENT
[FreeTextEntry1] : We discussed her course. MDP is prescribed.  Mobic is prescribed to be taken after MDP.  PT will continue. She will continue with HEP.  Questions answered.  Patient was seen by Dr. David Hart. Entered by Leonora Lee acting as scribe.

## 2024-04-25 ENCOUNTER — APPOINTMENT (OUTPATIENT)
Dept: CT IMAGING | Facility: CLINIC | Age: 69
End: 2024-04-25

## 2024-04-26 ENCOUNTER — APPOINTMENT (OUTPATIENT)
Dept: CT IMAGING | Facility: CLINIC | Age: 69
End: 2024-04-26
Payer: MEDICARE

## 2024-04-26 ENCOUNTER — OUTPATIENT (OUTPATIENT)
Dept: OUTPATIENT SERVICES | Facility: HOSPITAL | Age: 69
LOS: 1 days | End: 2024-04-26
Payer: MEDICARE

## 2024-04-26 DIAGNOSIS — Z98.891 HISTORY OF UTERINE SCAR FROM PREVIOUS SURGERY: Chronic | ICD-10-CM

## 2024-04-26 DIAGNOSIS — Z98.890 OTHER SPECIFIED POSTPROCEDURAL STATES: Chronic | ICD-10-CM

## 2024-04-26 DIAGNOSIS — Z96.652 PRESENCE OF LEFT ARTIFICIAL KNEE JOINT: Chronic | ICD-10-CM

## 2024-04-26 DIAGNOSIS — Z98.84 BARIATRIC SURGERY STATUS: Chronic | ICD-10-CM

## 2024-04-26 DIAGNOSIS — Z87.19 PERSONAL HISTORY OF OTHER DISEASES OF THE DIGESTIVE SYSTEM: Chronic | ICD-10-CM

## 2024-04-26 DIAGNOSIS — Z00.8 ENCOUNTER FOR OTHER GENERAL EXAMINATION: ICD-10-CM

## 2024-04-26 PROCEDURE — 74177 CT ABD & PELVIS W/CONTRAST: CPT | Mod: MH

## 2024-04-26 PROCEDURE — 74177 CT ABD & PELVIS W/CONTRAST: CPT | Mod: 26,MH

## 2024-04-29 ENCOUNTER — APPOINTMENT (OUTPATIENT)
Dept: NEUROLOGY | Facility: CLINIC | Age: 69
End: 2024-04-29

## 2024-05-17 ENCOUNTER — EMERGENCY (EMERGENCY)
Facility: HOSPITAL | Age: 69
LOS: 0 days | Discharge: HOME | End: 2024-05-18
Attending: STUDENT IN AN ORGANIZED HEALTH CARE EDUCATION/TRAINING PROGRAM
Payer: MEDICARE

## 2024-05-17 VITALS
HEART RATE: 97 BPM | DIASTOLIC BLOOD PRESSURE: 107 MMHG | RESPIRATION RATE: 18 BRPM | SYSTOLIC BLOOD PRESSURE: 145 MMHG | OXYGEN SATURATION: 97 %

## 2024-05-17 DIAGNOSIS — Z87.19 PERSONAL HISTORY OF OTHER DISEASES OF THE DIGESTIVE SYSTEM: Chronic | ICD-10-CM

## 2024-05-17 DIAGNOSIS — Z90.49 ACQUIRED ABSENCE OF OTHER SPECIFIED PARTS OF DIGESTIVE TRACT: Chronic | ICD-10-CM

## 2024-05-17 DIAGNOSIS — Z98.890 OTHER SPECIFIED POSTPROCEDURAL STATES: Chronic | ICD-10-CM

## 2024-05-17 DIAGNOSIS — Z88.1 ALLERGY STATUS TO OTHER ANTIBIOTIC AGENTS STATUS: ICD-10-CM

## 2024-05-17 DIAGNOSIS — Z96.652 PRESENCE OF LEFT ARTIFICIAL KNEE JOINT: Chronic | ICD-10-CM

## 2024-05-17 DIAGNOSIS — T78.2XXA ANAPHYLACTIC SHOCK, UNSPECIFIED, INITIAL ENCOUNTER: ICD-10-CM

## 2024-05-17 DIAGNOSIS — L50.9 URTICARIA, UNSPECIFIED: ICD-10-CM

## 2024-05-17 DIAGNOSIS — Z98.891 HISTORY OF UTERINE SCAR FROM PREVIOUS SURGERY: Chronic | ICD-10-CM

## 2024-05-17 DIAGNOSIS — Z98.84 BARIATRIC SURGERY STATUS: Chronic | ICD-10-CM
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
## 2024-05-17 DIAGNOSIS — Z87.39 PERSONAL HISTORY OF OTHER DISEASES OF THE MUSCULOSKELETAL SYSTEM AND CONNECTIVE TISSUE: Chronic | ICD-10-CM

## 2024-05-17 DIAGNOSIS — T78.40XA ALLERGY, UNSPECIFIED, INITIAL ENCOUNTER: ICD-10-CM

## 2024-05-17 PROCEDURE — 96374 THER/PROPH/DIAG INJ IV PUSH: CPT

## 2024-05-17 PROCEDURE — 99285 EMERGENCY DEPT VISIT HI MDM: CPT

## 2024-05-17 PROCEDURE — 99284 EMERGENCY DEPT VISIT MOD MDM: CPT

## 2024-05-17 PROCEDURE — 96375 TX/PRO/DX INJ NEW DRUG ADDON: CPT

## 2024-05-17 PROCEDURE — 96372 THER/PROPH/DIAG INJ SC/IM: CPT | Mod: XU

## 2024-05-17 RX ORDER — DIPHENHYDRAMINE HCL 50 MG
50 CAPSULE ORAL ONCE
Refills: 0 | Status: COMPLETED | OUTPATIENT
Start: 2024-05-17 | End: 2024-05-17

## 2024-05-17 RX ORDER — FAMOTIDINE 10 MG/ML
40 INJECTION INTRAVENOUS ONCE
Refills: 0 | Status: COMPLETED | OUTPATIENT
Start: 2024-05-17 | End: 2024-05-17

## 2024-05-17 RX ORDER — EPINEPHRINE 0.3 MG/.3ML
0.3 INJECTION INTRAMUSCULAR; SUBCUTANEOUS ONCE
Refills: 0 | Status: COMPLETED | OUTPATIENT
Start: 2024-05-17 | End: 2024-05-17

## 2024-05-17 RX ADMIN — Medication 125 MILLIGRAM(S): at 23:56

## 2024-05-17 RX ADMIN — EPINEPHRINE 0.3 MILLIGRAM(S): 0.3 INJECTION INTRAMUSCULAR; SUBCUTANEOUS at 23:56

## 2024-05-17 RX ADMIN — Medication 50 MILLIGRAM(S): at 23:57

## 2024-05-17 RX ADMIN — FAMOTIDINE 108 MILLIGRAM(S): 10 INJECTION INTRAVENOUS at 23:57

## 2024-05-18 VITALS
RESPIRATION RATE: 16 BRPM | HEART RATE: 66 BPM | OXYGEN SATURATION: 97 % | SYSTOLIC BLOOD PRESSURE: 147 MMHG | DIASTOLIC BLOOD PRESSURE: 85 MMHG | TEMPERATURE: 98 F

## 2024-05-18 RX ORDER — EPINEPHRINE 0.3 MG/.3ML
0.3 INJECTION INTRAMUSCULAR; SUBCUTANEOUS
Qty: 1 | Refills: 0
Start: 2024-05-18 | End: 2024-05-18

## 2024-05-18 RX ORDER — CEFPODOXIME PROXETIL 100 MG
1 TABLET ORAL
Qty: 14 | Refills: 0
Start: 2024-05-18 | End: 2024-05-24

## 2024-05-18 RX ORDER — EPINEPHRINE 0.3 MG/.3ML
0.1 INJECTION INTRAMUSCULAR; SUBCUTANEOUS
Refills: 0
Start: 2024-05-18

## 2024-05-18 RX ORDER — CEFPODOXIME PROXETIL 100 MG
200 TABLET ORAL ONCE
Refills: 0 | Status: COMPLETED | OUTPATIENT
Start: 2024-05-18 | End: 2024-05-18

## 2024-05-18 RX ADMIN — Medication 200 MILLIGRAM(S): at 02:34

## 2024-05-18 NOTE — ED PROVIDER NOTE - PHYSICAL EXAMINATION
Pamela Cr MD Attending  GEN: Patient awake alert NAD.   HEENT: normocephalic, atraumatic, EOMI, no scleral icterus, moist MM, no tongue or oropharynx edema.   PULM: CTA B/L, no wheeze.   MSK: Moving all extremities  NEURO: A&Ox3, no focal neurological deficits  SKIN: + hives, face, proximal arms and torso.

## 2024-05-18 NOTE — ED ADULT NURSE NOTE - CAS EDN DISCHARGE INTERVENTIONS
Dr. Dominique Ingram is supervising clinician today.    Levon presents to the clinic [facility] for 1 unit(s) of PRBCs    ECOG   ECOG Performance Status        Nursing Assessment: A focused nursing assessment was performed and the patient reports the following: Bleeding: YES  Shortness of Breath: YES  Fatigue/Weakness: YES  Edema: YES  Pain: YES, Pain Rating:  10, Location: generalized, back, Intervention: rx pain medication for relief, medication administered per MD order        Pre-Treatment:  Visit Vitals  BP 98/51 (BP Location: LUE - Left upper extremity, Patient Position: Right side lying)   Pulse 88   Temp 98.8 °F (37.1 °C)   Resp 18     Patient type and crossed on 03/30/21  Consent obtained by provider.  Premedications: Yes     Labs:  WBC (K/mcL)   Date Value   03/30/2021 7.4     RBC (mil/mcL)   Date Value   03/30/2021 2.60 (L)     HCT (%)   Date Value   03/30/2021 20.7 (L)     HGB (g/dL)   Date Value   03/30/2021 5.7 (LL)     PLT (K/mcL)   Date Value   03/30/2021 67 (L)        Treatment:  Refer to LDA and MAR for line assessment and medication administration  Refer to Blood Administration flowsheet for documentation  Patient is identified by first & last name, date of birth, MRN, with the patient at the chair side.      Post Treatment: Treatment tolerated well; no adverse reaction  ?  Education:  Barriers to Learning: None  Teaching Method: Blood Transfusion FYWB  Reviewed possible transfusion reaction including: type of reaction, symptoms and management.  Learner Outcomes:  Patient and/or caregiver verbalize understanding of information given    Next appointment scheduled:   Future Appointments   Date Time Provider Department Center   4/9/2021  1:40 PM SDT ACL [facility] PRE-PROCEDURE TESTING SDTSIP SDT   4/23/2021  1:00 PM SDT ECHO 2 [facility] SDTEC1 SDT       Patient instructed to call the office with any questions or concerns.     Patient Discharged: Pt discharged to home per self, ambulatory.   IV discontinued, cath removed intact

## 2024-05-18 NOTE — ED PROVIDER NOTE - CLINICAL SUMMARY MEDICAL DECISION MAKING FREE TEXT BOX
Pamela Cr MD, Attending  ddx includes, but is not limited to the following: anaphylaxis, allergic reaction, lower suspicion for angioedema, hereditary angioedema.   plan: anaphylaxis cocktail, obs for 4 hours from time of epi   update: see progress notes.   ----

## 2024-05-18 NOTE — ED PROVIDER NOTE - SPECIALTY CARE
"Medical screening examination initiated.  I have conducted a focused provider triage encounter, findings are as follows:    Brief history of present illness:  Patient reports midsternal chest pain    Vitals:    03/31/23 1028   BP: (!) 165/93   BP Location: Right arm   Patient Position: Sitting   Pulse: 74   Resp: 20   Temp: 98.6 °F (37 °C)   TempSrc: Oral   SpO2: 99%   Weight: (!) 146 kg (321 lb 14 oz)   Height: 6' 2" (1.88 m)       Pertinent physical exam:  No acute distress    Brief workup plan:  Labs, EKG, imaging    Preliminary workup initiated; this workup will be continued and followed by the physician or advanced practice provider that is assigned to the patient when roomed.  " Allergy/Immunology

## 2024-05-18 NOTE — ED PROVIDER NOTE - PATIENT PORTAL LINK FT
You can access the FollowMyHealth Patient Portal offered by [facility] by registering at the following website: [email or web address]. By joining [employer]’s FollowMyHealth portal, you will also be able to view your health information using other applications (apps) compatible with our system.

## 2024-05-18 NOTE — ED PROVIDER NOTE - PROGRESS NOTE DETAILS
Pamela Cr MD, Attending Pamela Cr MD, Attending  Pt feeling much better, at her baseline. agreeabel to oupt follow up with allergy. Rx prednisone and epi pen sent to pharmacy.

## 2024-05-18 NOTE — ED PROVIDER NOTE - NSFOLLOWUPINSTRUCTIONS_ED_ALL_ED_FT
** You are prescribed prednisone, cefpodoxime. Take as directed by your pharmacists.   ** Use your epi pen if you have an allergic reaction with difficulty breathing and voice change.     ** Follow up with your primary care doctor in the next 72 hours.   ** A referral coordinator will reach out to you to help you schedule an appointment with allergy doctor.    ** Go to the nearest Emergency Department if you experience any new or concerning symptoms, such as:   - worsening pain  - chest pain  - difficulty breathing  - passing out  - unable to eat or drink  - unable to move or feel part of your body  - fever, chills

## 2024-05-18 NOTE — ED PROVIDER NOTE - OBJECTIVE STATEMENT
70 yo F hx of levocloxacin allergy, pw allergic reaction. Pt was Rx ciprofloxacin for UTI. Pt took first dose tonight and shortly after developed hives all over and hoarse voice. no fever and chills, no sob, no cp, no vomiting.

## 2024-05-18 NOTE — ED ADULT NURSE NOTE - OBJECTIVE STATEMENT
pt came in through [facility] with difficulty breathing, hoarseness, puritus, and hives after taking levofloxacin abt for uti at approximately 10pm. pt came in through [facility] with difficulty breathing, hoarseness, puritus, and hives after taking levofloxacin abt for uti at approximately 10pm. pmh of hld and hypothyroidism

## 2024-05-18 NOTE — ED PROVIDER NOTE - NSDCPRINTRESULTS_ED_ALL_ED
Mode of arrival (squad #, walk in, police, etc) : Carried into triage        Chief complaint(s): Cough        Arrival Note (brief scenario, treatment PTA, etc). : Complaining of cough, congestion and decreased po intake x 3 days. Patient is taking less formula but still wetting diapers. Awake and alert. C= \"Have you ever felt that you should Cut down on your drinking? \"  No  A= \"Have people Annoyed you by criticizing your drinking? \"  No  G= \"Have you ever felt bad or Guilty about your drinking? \"  No  E= \"Have you ever had a drink as an Eye-opener first thing in the morning to steady your nerves or to help a hangover? \"  No      Deferred []      Reason for deferring: N/A    *If yes to two or more: probable alcohol abuse. Geetha Gutierrez RN  11/16/21 4495 Patient requests all Lab, Cardiology, and Radiology Results on their Discharge Instructions

## 2024-05-28 NOTE — H&P PST ADULT - RESPIRATORY RATE (BREATHS/MIN)
Comprehensive Nutrition Assessment    Type and Reason for Visit:  Initial, Positive Nutrition Screen    Nutrition Recommendations/Plan:   Continue current diet ; note pt w/ hyperkalemia and hyperglycemia, consider K and CHO restriction if labs remain elevated  Start Nepro once/day to promote oral intake  Will monitor     Malnutrition Assessment:  Malnutrition Status:  At risk for malnutrition (Comment) (05/28/24 7064)    Context:  Chronic Illness     Findings of the 6 clinical characteristics of malnutrition:  Energy Intake:  Mild decrease in energy intake (Comment)  Weight Loss:  Unable to assess     Body Fat Loss:  Unable to assess     Muscle Mass Loss:  Unable to assess    Fluid Accumulation:  No significant fluid accumulation     Strength:  Not Performed    Nutrition Assessment:    pt adm d/t AMS/hyperamonemic encephalopathy w/ emesis; PMhx of ACOSTA cirrhosis w/ ascites, DM, esophageal varices s/p banding, GERD,SDH; on chronic lactulose; DONNA on CKD noted; will start ONS to promote oral intake; will monitor.    Nutrition Related Findings:    Uzbek speaking; hyperammonia (90) ; hyperkalemia; elevated BUN/Cr; hyperglycemia; phos WNL; elevated AST; A&Ox2 (oriented/disoriented at times); no edema; active BS; I/O WNL Wound Type: None       Current Nutrition Intake & Therapies:    Average Meal Intake: 51-75%  Average Supplements Intake: None Ordered  ADULT DIET; Regular; Low Sodium (2 gm); High Fiber  ADULT ORAL NUTRITION SUPPLEMENT; Lunch; Renal Oral Supplement    Anthropometric Measures:  Height: 160 cm (5' 2.99\")  Ideal Body Weight (IBW): 115 lbs (52 kg)       Current Body Weight: 74.4 kg (164 lb 0.4 oz) (5/26-stated), 142.6 % IBW. Weight Source: Stated  Current BMI (kg/m2): 29.1  Usual Body Weight: 74.8 kg (164 lb 14.5 oz) (3/13/24-BS)  % Weight Change (Calculated): -0.5  Weight Adjustment For: No Adjustment                 BMI Categories: Overweight (BMI 25.0-29.9)    Estimated Daily Nutrient Needs:  Energy  Requirements Based On: Formula  Weight Used for Energy Requirements: Current  Energy (kcal/day): 1204-7417  Weight Used for Protein Requirements: Ideal  Protein (g/day): 1.0-1.2g/kgxIBW=50-60g (as tolerated ; monitor ammonia & LFTs)  Method Used for Fluid Requirements: 1 ml/kcal  Fluid (ml/day): 2711-6265    Nutrition Diagnosis:   Inadequate oral intake related to cognitive or neurological impairment (adm w/ AMS) as evidenced by poor intake prior to admission, intake 51-75%    Nutrition Interventions:   Food and/or Nutrient Delivery: Continue Current Diet, Start Oral Nutrition Supplement (Nepro once/day ; consider CHO and K restriction if levels remain elevated)  Nutrition Education/Counseling: Education not indicated  Coordination of Nutrition Care: Continue to monitor while inpatient       Goals:     Goals: PO intake 75% or greater, by next RD assessment       Nutrition Monitoring and Evaluation:   Behavioral-Environmental Outcomes: None Identified  Food/Nutrient Intake Outcomes: Food and Nutrient Intake, Supplement Intake  Physical Signs/Symptoms Outcomes: Biochemical Data, Nutrition Focused Physical Findings, Chewing or Swallowing, Skin, Weight, GI Status, Fluid Status or Edema    Discharge Planning:    Too soon to determine     Susan Harrell RD, LD  Contact: [phone]     20

## 2024-07-24 ENCOUNTER — APPOINTMENT (OUTPATIENT)
Dept: ORTHOPEDIC SURGERY | Facility: CLINIC | Age: 69
End: 2024-07-24
Payer: MEDICARE

## 2024-07-24 DIAGNOSIS — M75.111 INCOMPLETE ROTATOR CUFF TEAR OR RUPTURE OF RIGHT SHOULDER, NOT SPECIFIED AS TRAUMATIC: ICD-10-CM

## 2024-07-24 DIAGNOSIS — M75.21 BICIPITAL TENDINITIS, RIGHT SHOULDER: ICD-10-CM

## 2024-07-24 DIAGNOSIS — M75.41 IMPINGEMENT SYNDROME OF RIGHT SHOULDER: ICD-10-CM

## 2024-07-24 PROCEDURE — 99214 OFFICE O/P EST MOD 30 MIN: CPT | Mod: 25

## 2024-07-24 PROCEDURE — 20611 DRAIN/INJ JOINT/BURSA W/US: CPT | Mod: RT

## 2024-07-24 NOTE — ASSESSMENT
[FreeTextEntry1] : We reviewed her options. An injection is planned today.  She will continue with HEP.  She will call for follow up. If symptoms persist, an MRI is planned.  Questions answered.   Patient was seen by Dr. David Hart. Patient was seen by Giselle GUTIÉRREZ under the supervision of Dr. David Hart. Progress note was completed by Giselle GUTIÉRREZ. Entered by Leonora Lee acting as scribe.   Procedure Name: Large Joint Injection / Aspiration: Depomedrol, Lidocaine and Guidance Ultrasound   Large Joint Injection was performed because of pain and inflammation. Depomedrol: An injection of Depomedrol 40 mg/cc, 2 cc. Lidocaine: An injection of Lidocaine 1 mg/cc, 13 cc.   Medication was injected in the right subacromial space and glenohumeral joint from a posterior approach. Patient has tried OTC's including aspirin, Ibuprofen, Aleve etc or prescription NSAIDS, and/or exercises at home and/ or physical therapy without satisfactory response. The risks, benefits, and alternatives to steroid injection were explained in full to the patient. Risks outlined include but are not limited to infection, sepsis, bleeding, scarring, skin discoloration, temporary increase in pain, syncopal episode, failure to resolve symptoms, allergic reaction, symptom recurrence, and elevation of blood sugar in diabetics. Patient understood the risks. All questions were answered. After discussion, patient requested an injection. Oral informed consent was obtained.  Sterile preparation with betadine and aseptic technique was utilized for the procedure, including the preparation of the solutions used for the injection. Patient tolerated the procedure well.  Post Procedure Instructions: Patient was advised to call if redness, pain, or fever occur and apply ice for 15 min. out of every hour for the next 12-24 hours as tolerated. Patient was advised to rest the joint(s) for 3 days.  Advised to ice the injection site this evening. Ultrasound Guidance was used for the following reasons: for precise injection in area of tear. Visualization of the needle and placement of injection was performed without complication.

## 2024-07-24 NOTE — PHYSICAL EXAM
[Right] : right shoulder [Sitting] : sitting [4 ___] : forward flexion 4[unfilled]/5 [Mild] : mild [] : no sensory deficits [de-identified] : +arc [TWNoteComboBox4] : passive forward flexion 160 degrees [TWNoteComboBox6] : internal rotation L3 [de-identified] : external rotation 70 degrees

## 2024-07-24 NOTE — REASON FOR VISIT
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  DOS: 1/9/2024 Procedure: Right Shoulder Arthroscopy, Glenohumeral Debridement, Synovectomy, Biceps Tenotomy, Rotator Cuff Repair, Subacromial Decompression, Distal Clavicle Resection Diagnosis: Partial Subscapularis Tear, Biceps Tendinitis, Partial Supraspinatus Tear, Subacromial Impingement, Shoulder Pain, Glenohumeral Synovitis, Glenohumeral Chondrosis, SLAP Tear, Partial Anterior Labral Tear, AC Arthralgia  She goes to PT 1x per week.  She did power wash with more pain.  She has been lifting.  She is working.  She takes no medications.

## 2024-08-21 ENCOUNTER — APPOINTMENT (OUTPATIENT)
Dept: OBGYN | Facility: CLINIC | Age: 69
End: 2024-08-21

## 2024-09-19 ENCOUNTER — APPOINTMENT (OUTPATIENT)
Dept: RADIOLOGY | Facility: CLINIC | Age: 69
End: 2024-09-19
Payer: MEDICARE

## 2024-09-19 ENCOUNTER — OUTPATIENT (OUTPATIENT)
Dept: OUTPATIENT SERVICES | Facility: HOSPITAL | Age: 69
LOS: 1 days | End: 2024-09-19
Payer: MEDICARE

## 2024-09-19 DIAGNOSIS — Z96.652 PRESENCE OF LEFT ARTIFICIAL KNEE JOINT: Chronic | ICD-10-CM

## 2024-09-19 DIAGNOSIS — Z90.49 ACQUIRED ABSENCE OF OTHER SPECIFIED PARTS OF DIGESTIVE TRACT: Chronic | ICD-10-CM

## 2024-09-19 DIAGNOSIS — Z98.890 OTHER SPECIFIED POSTPROCEDURAL STATES: Chronic | ICD-10-CM

## 2024-09-19 DIAGNOSIS — Z98.891 HISTORY OF UTERINE SCAR FROM PREVIOUS SURGERY: Chronic | ICD-10-CM

## 2024-09-19 DIAGNOSIS — Z87.19 PERSONAL HISTORY OF OTHER DISEASES OF THE DIGESTIVE SYSTEM: Chronic | ICD-10-CM

## 2024-09-19 DIAGNOSIS — Z98.84 BARIATRIC SURGERY STATUS: Chronic | ICD-10-CM

## 2024-09-19 DIAGNOSIS — Z00.8 ENCOUNTER FOR OTHER GENERAL EXAMINATION: ICD-10-CM

## 2024-09-19 PROCEDURE — 71046 X-RAY EXAM CHEST 2 VIEWS: CPT

## 2024-09-19 PROCEDURE — 71046 X-RAY EXAM CHEST 2 VIEWS: CPT | Mod: 26

## 2024-09-25 ENCOUNTER — APPOINTMENT (OUTPATIENT)
Dept: MRI IMAGING | Facility: CLINIC | Age: 69
End: 2024-09-25

## 2024-09-25 ENCOUNTER — OUTPATIENT (OUTPATIENT)
Dept: OUTPATIENT SERVICES | Facility: HOSPITAL | Age: 69
LOS: 1 days | End: 2024-09-25
Payer: MEDICARE

## 2024-09-25 DIAGNOSIS — D32.9 BENIGN NEOPLASM OF MENINGES, UNSPECIFIED: ICD-10-CM

## 2024-09-25 DIAGNOSIS — Z87.39 PERSONAL HISTORY OF OTHER DISEASES OF THE MUSCULOSKELETAL SYSTEM AND CONNECTIVE TISSUE: Chronic | ICD-10-CM

## 2024-09-25 DIAGNOSIS — Z90.49 ACQUIRED ABSENCE OF OTHER SPECIFIED PARTS OF DIGESTIVE TRACT: Chronic | ICD-10-CM

## 2024-09-25 DIAGNOSIS — Z96.652 PRESENCE OF LEFT ARTIFICIAL KNEE JOINT: Chronic | ICD-10-CM

## 2024-09-25 DIAGNOSIS — Z98.84 BARIATRIC SURGERY STATUS: Chronic | ICD-10-CM

## 2024-09-25 DIAGNOSIS — Z98.890 OTHER SPECIFIED POSTPROCEDURAL STATES: Chronic | ICD-10-CM

## 2024-09-25 DIAGNOSIS — Z87.19 PERSONAL HISTORY OF OTHER DISEASES OF THE DIGESTIVE SYSTEM: Chronic | ICD-10-CM

## 2024-09-25 DIAGNOSIS — Z98.891 HISTORY OF UTERINE SCAR FROM PREVIOUS SURGERY: Chronic | ICD-10-CM

## 2024-09-25 PROCEDURE — 70553 MRI BRAIN STEM W/O & W/DYE: CPT

## 2024-09-25 PROCEDURE — 70553 MRI BRAIN STEM W/O & W/DYE: CPT | Mod: 26,MH

## 2024-09-25 PROCEDURE — A9585: CPT

## 2024-09-26 ENCOUNTER — APPOINTMENT (OUTPATIENT)
Dept: PULMONOLOGY | Facility: CLINIC | Age: 69
End: 2024-09-26

## 2024-09-26 ENCOUNTER — APPOINTMENT (OUTPATIENT)
Dept: INTERNAL MEDICINE | Facility: CLINIC | Age: 69
End: 2024-09-26
Payer: MEDICARE

## 2024-09-26 ENCOUNTER — APPOINTMENT (OUTPATIENT)
Dept: OBGYN | Facility: CLINIC | Age: 69
End: 2024-09-26
Payer: MEDICARE

## 2024-09-26 VITALS
HEART RATE: 75 BPM | SYSTOLIC BLOOD PRESSURE: 144 MMHG | BODY MASS INDEX: 48.33 KG/M2 | WEIGHT: 256 LBS | OXYGEN SATURATION: 96 % | TEMPERATURE: 98.1 F | DIASTOLIC BLOOD PRESSURE: 80 MMHG | HEIGHT: 61 IN | RESPIRATION RATE: 18 BRPM

## 2024-09-26 VITALS
BODY MASS INDEX: 48.9 KG/M2 | HEIGHT: 61 IN | WEIGHT: 259 LBS | SYSTOLIC BLOOD PRESSURE: 140 MMHG | DIASTOLIC BLOOD PRESSURE: 80 MMHG

## 2024-09-26 DIAGNOSIS — G47.33 OBSTRUCTIVE SLEEP APNEA (ADULT) (PEDIATRIC): ICD-10-CM

## 2024-09-26 DIAGNOSIS — R05.9 COUGH, UNSPECIFIED: ICD-10-CM

## 2024-09-26 DIAGNOSIS — R91.1 SOLITARY PULMONARY NODULE: ICD-10-CM

## 2024-09-26 DIAGNOSIS — Z01.419 ENCOUNTER FOR GYNECOLOGICAL EXAMINATION (GENERAL) (ROUTINE) W/OUT ABNORMAL FINDINGS: ICD-10-CM

## 2024-09-26 PROCEDURE — G0101: CPT

## 2024-09-26 PROCEDURE — ZZZZZ: CPT

## 2024-09-26 PROCEDURE — G0447 BEHAVIOR COUNSEL OBESITY 15M: CPT | Mod: 59

## 2024-09-26 PROCEDURE — 99204 OFFICE O/P NEW MOD 45 MIN: CPT | Mod: 25

## 2024-09-26 PROCEDURE — 94010 BREATHING CAPACITY TEST: CPT

## 2024-09-26 PROCEDURE — 94729 DIFFUSING CAPACITY: CPT

## 2024-09-26 PROCEDURE — 94727 GAS DIL/WSHOT DETER LNG VOL: CPT

## 2024-09-26 NOTE — ASSESSMENT
[FreeTextEntry1] : 69 years old pleasant woman with obesity prior history of lung cancer status postradiation and chemo currently cancer free history of meningioma resected, sleep apnea came to establish care. Previously seen few years back in our office. She has been using AutoPap without oxygen with a nasal mask but reports daytime somnolence fatigue and leakage. She has gained weight since last few years. Sleep study done in 2018.  Problems. History of sleep apnea with recent weight gain and intolerance to current settings History of seasonal cough likely from acid reflux Obesity History of lung nodules seen in 2016, patient has history of anal cancer-currently cancer free  Recommendations. Split-night sleep studies Pulmonary function test discussed with patient  CT scan of the chest to follow lung nodules as patient has a history of And lung cancer and history of meningioma, and prior history of smoking Continue acid reflux treatment Weight loss Sleep hygiene sleep propped up Form was filled for pseg

## 2024-09-26 NOTE — CONSULT LETTER
[Dear  ___] : Dear  [unfilled], [Consult Letter:] : I had the pleasure of evaluating your patient, [unfilled]. [Please see my note below.] : Please see my note below. [Sincerely,] : Sincerely, [FreeTextEntry1] : I have ordered a split-night sleep study to see if she meets criteria for new settings and new machine. I think her cough is from acid reflux. Pulmonary function tests are unremarkable [FreeTextEntry3] : Mojgan Ruano MD MPH DipABLM  FACP FCCP [facility]  Faculty pulmonary critical care medicine , [address] [address] t Telephone [phone]

## 2024-09-26 NOTE — PROCEDURE
[FreeTextEntry1] : Pulmonary function test showed normal spirometry no bronchodilator response normal DLCO.

## 2024-09-26 NOTE — HISTORY OF PRESENT ILLNESS
[Former] : former [Never] : never [TextBox_4] : 69 years old pleasant woman with obesity prior history of lung cancer status postradiation and chemo currently cancer free history of meningioma resected, sleep apnea came to establish care. Previously seen few years back in our office. She has been using AutoPap without oxygen with a nasal mask but reports daytime somnolence fatigue and leakage. She has gained weight since last few years. Sleep study done in 2018. Patient also reports history of seasonal cough. Not on inhaled medications remote history of smoking quit in 1990. She has history of gastroesophageal reflux disease and is also planning to go on Wegovy for weight loss. Currently not on inhaled medications no recent use of oxygen no history of CVA. Today patient denies No fever, rash, or recent illness.    No eye pain/redness/change in vision.  No sores in the mouth or nose.  No difficulty swallowing.  No chest pain or shortness of breath.  No abdominal complaints or weight loss.  No weakness.  No headaches or focal neurological eficits.  No urinary changes.   Hairdresser by occupation  [Obstructive Sleep Apnea] : obstructive sleep apnea [Awakes with Headache] : awakes with headache [Difficulty Initiating Sleep] : difficulty initiating sleep [Fatigue] : fatigue [Paresthesias] : denies paresthesias [Snoring] : snoring [TextBox_19] : On CPAP

## 2024-09-26 NOTE — REVIEW OF SYSTEMS
[Fatigue] : fatigue [Poor Appetite] : no poor appetite [Cough] : cough [Hemoptysis] : no hemoptysis [Chest Tightness] : no chest tightness [Sputum] : no sputum [Dyspnea] : no dyspnea [Pleuritic Pain] : no pleuritic pain [A.M. Dry Mouth] : a.m. dry mouth [SOB on Exertion] : sob on exertion [Negative] : Endocrine

## 2024-09-26 NOTE — COUNSELING
[Sleep ___ hours/day] : Sleep [unfilled] hours/day [Engage in a relaxing activity] : Engage in a relaxing activity [FreeTextEntry1] : 10 [Potential consequences of obesity discussed] : Potential consequences of obesity discussed [Benefits of weight loss discussed] : Benefits of weight loss discussed [Encouraged to increase physical activity] : Encouraged to increase physical activity [Encouraged to use exercise tracking device] : Encouraged to use exercise tracking device [Good understanding] : Patient has a good understanding of disease, goals and obesity follow-up plan [FreeTextEntry4] : 10

## 2024-09-26 NOTE — PHYSICAL EXAM
[No Acute Distress] : no acute distress [Well Nourished] : well nourished [Well Developed] : well developed [Normal Oropharynx] : normal oropharynx [Erythema] : erythema [I] : Mallampati Class: I [Normal Appearance] : normal appearance [No Neck Mass] : no neck mass [Normal Rate/Rhythm] : normal rate/rhythm [Normal S1, S2] : normal s1, s2 [No Murmurs] : no murmurs [No Resp Distress] : no resp distress [Clear to Auscultation Bilaterally] : clear to auscultation bilaterally [No Abnormalities] : no abnormalities [Benign] : benign [Normal Gait] : normal gait [No Clubbing] : no clubbing [No Cyanosis] : no cyanosis [No Edema] : no edema [Normal Color/ Pigmentation] : normal color/ pigmentation [No Rash] : no rash [No Focal Deficits] : no focal deficits [Oriented x3] : oriented x3 [Normal Mood] : normal mood [Normal Affect] : normal affect

## 2024-09-27 LAB — HPV HIGH+LOW RISK DNA PNL CVX: NOT DETECTED

## 2024-09-30 LAB — CYTOLOGY CVX/VAG DOC THIN PREP: ABNORMAL

## 2024-10-07 ENCOUNTER — APPOINTMENT (OUTPATIENT)
Dept: INTERNAL MEDICINE | Facility: CLINIC | Age: 69
End: 2024-10-07

## 2024-11-05 ENCOUNTER — OUTPATIENT (OUTPATIENT)
Dept: OUTPATIENT SERVICES | Facility: HOSPITAL | Age: 69
LOS: 1 days | End: 2024-11-05
Payer: MEDICARE

## 2024-11-05 DIAGNOSIS — Z87.19 PERSONAL HISTORY OF OTHER DISEASES OF THE DIGESTIVE SYSTEM: Chronic | ICD-10-CM

## 2024-11-05 DIAGNOSIS — Z98.890 OTHER SPECIFIED POSTPROCEDURAL STATES: Chronic | ICD-10-CM

## 2024-11-05 DIAGNOSIS — G47.33 OBSTRUCTIVE SLEEP APNEA (ADULT) (PEDIATRIC): ICD-10-CM

## 2024-11-05 DIAGNOSIS — Z87.39 PERSONAL HISTORY OF OTHER DISEASES OF THE MUSCULOSKELETAL SYSTEM AND CONNECTIVE TISSUE: Chronic | ICD-10-CM

## 2024-11-05 DIAGNOSIS — Z96.652 PRESENCE OF LEFT ARTIFICIAL KNEE JOINT: Chronic | ICD-10-CM

## 2024-11-05 DIAGNOSIS — Z90.49 ACQUIRED ABSENCE OF OTHER SPECIFIED PARTS OF DIGESTIVE TRACT: Chronic | ICD-10-CM

## 2024-11-05 DIAGNOSIS — Z98.84 BARIATRIC SURGERY STATUS: Chronic | ICD-10-CM

## 2024-11-05 DIAGNOSIS — Z98.891 HISTORY OF UTERINE SCAR FROM PREVIOUS SURGERY: Chronic | ICD-10-CM

## 2024-11-18 ENCOUNTER — NON-APPOINTMENT (OUTPATIENT)
Age: 69
End: 2024-11-18

## 2024-11-18 ENCOUNTER — RESULT REVIEW (OUTPATIENT)
Age: 69
End: 2024-11-18

## 2024-11-18 ENCOUNTER — APPOINTMENT (OUTPATIENT)
Dept: CT IMAGING | Facility: CLINIC | Age: 69
End: 2024-11-18
Payer: MEDICARE

## 2024-11-18 ENCOUNTER — APPOINTMENT (OUTPATIENT)
Dept: ULTRASOUND IMAGING | Facility: CLINIC | Age: 69
End: 2024-11-18
Payer: MEDICARE

## 2024-11-18 ENCOUNTER — APPOINTMENT (OUTPATIENT)
Dept: MAMMOGRAPHY | Facility: CLINIC | Age: 69
End: 2024-11-18
Payer: MEDICARE

## 2024-11-18 ENCOUNTER — OUTPATIENT (OUTPATIENT)
Dept: OUTPATIENT SERVICES | Facility: HOSPITAL | Age: 69
LOS: 1 days | End: 2024-11-18
Payer: MEDICARE

## 2024-11-18 DIAGNOSIS — Z87.19 PERSONAL HISTORY OF OTHER DISEASES OF THE DIGESTIVE SYSTEM: Chronic | ICD-10-CM

## 2024-11-18 DIAGNOSIS — Z98.890 OTHER SPECIFIED POSTPROCEDURAL STATES: Chronic | ICD-10-CM

## 2024-11-18 DIAGNOSIS — Z98.891 HISTORY OF UTERINE SCAR FROM PREVIOUS SURGERY: Chronic | ICD-10-CM

## 2024-11-18 DIAGNOSIS — R05.9 COUGH, UNSPECIFIED: ICD-10-CM

## 2024-11-18 DIAGNOSIS — Z87.39 PERSONAL HISTORY OF OTHER DISEASES OF THE MUSCULOSKELETAL SYSTEM AND CONNECTIVE TISSUE: Chronic | ICD-10-CM

## 2024-11-18 DIAGNOSIS — Z96.652 PRESENCE OF LEFT ARTIFICIAL KNEE JOINT: Chronic | ICD-10-CM

## 2024-11-18 DIAGNOSIS — Z90.49 ACQUIRED ABSENCE OF OTHER SPECIFIED PARTS OF DIGESTIVE TRACT: Chronic | ICD-10-CM

## 2024-11-18 DIAGNOSIS — Z12.39 ENCOUNTER FOR OTHER SCREENING FOR MALIGNANT NEOPLASM OF BREAST: ICD-10-CM

## 2024-11-18 DIAGNOSIS — Z98.84 BARIATRIC SURGERY STATUS: Chronic | ICD-10-CM

## 2024-11-18 PROCEDURE — 77067 SCR MAMMO BI INCL CAD: CPT | Mod: 26

## 2024-11-18 PROCEDURE — 74177 CT ABD & PELVIS W/CONTRAST: CPT | Mod: 26,MH

## 2024-11-18 PROCEDURE — 71250 CT THORAX DX C-: CPT | Mod: 26,MH

## 2024-11-18 PROCEDURE — 74177 CT ABD & PELVIS W/CONTRAST: CPT | Mod: MH

## 2024-11-18 PROCEDURE — 77067 SCR MAMMO BI INCL CAD: CPT

## 2024-11-18 PROCEDURE — 77063 BREAST TOMOSYNTHESIS BI: CPT

## 2024-11-18 PROCEDURE — 77063 BREAST TOMOSYNTHESIS BI: CPT | Mod: 26

## 2024-11-18 PROCEDURE — 71250 CT THORAX DX C-: CPT

## 2024-11-20 PROCEDURE — 95811 POLYSOM 6/>YRS CPAP 4/> PARM: CPT

## 2024-11-22 ENCOUNTER — APPOINTMENT (OUTPATIENT)
Dept: ORTHOPEDIC SURGERY | Facility: CLINIC | Age: 69
End: 2024-11-22
Payer: MEDICARE

## 2024-11-22 DIAGNOSIS — Z96.652 PRESENCE OF LEFT ARTIFICIAL KNEE JOINT: ICD-10-CM

## 2024-11-22 DIAGNOSIS — M25.551 PAIN IN RIGHT HIP: ICD-10-CM

## 2024-11-22 DIAGNOSIS — M25.552 PAIN IN RIGHT HIP: ICD-10-CM

## 2024-11-22 PROCEDURE — 99214 OFFICE O/P EST MOD 30 MIN: CPT

## 2024-11-22 PROCEDURE — 73562 X-RAY EXAM OF KNEE 3: CPT | Mod: LT

## 2024-11-22 PROCEDURE — 73502 X-RAY EXAM HIP UNI 2-3 VIEWS: CPT

## 2024-11-22 RX ORDER — METHYLPREDNISOLONE 4 MG/1
4 TABLET ORAL
Qty: 1 | Refills: 0 | Status: ACTIVE | COMMUNITY
Start: 2024-11-22 | End: 1900-01-01

## 2024-11-22 RX ORDER — CELECOXIB 200 MG/1
200 CAPSULE ORAL
Qty: 60 | Refills: 1 | Status: ACTIVE | COMMUNITY
Start: 2024-11-22 | End: 1900-01-01

## 2024-11-22 RX ORDER — AMOXICILLIN 500 MG/1
500 CAPSULE ORAL
Qty: 20 | Refills: 4 | Status: ACTIVE | COMMUNITY
Start: 2024-11-22 | End: 1900-01-01

## 2024-11-25 ENCOUNTER — APPOINTMENT (OUTPATIENT)
Dept: PULMONOLOGY | Facility: CLINIC | Age: 69
End: 2024-11-25
Payer: MEDICARE

## 2024-11-25 DIAGNOSIS — R91.1 SOLITARY PULMONARY NODULE: ICD-10-CM

## 2024-11-25 PROCEDURE — 99214 OFFICE O/P EST MOD 30 MIN: CPT

## 2024-11-27 ENCOUNTER — APPOINTMENT (OUTPATIENT)
Dept: RHEUMATOLOGY | Facility: CLINIC | Age: 69
End: 2024-11-27
Payer: MEDICARE

## 2024-11-27 ENCOUNTER — NON-APPOINTMENT (OUTPATIENT)
Age: 69
End: 2024-11-27

## 2024-11-27 VITALS
WEIGHT: 254 LBS | HEIGHT: 61 IN | SYSTOLIC BLOOD PRESSURE: 132 MMHG | TEMPERATURE: 96 F | HEART RATE: 82 BPM | DIASTOLIC BLOOD PRESSURE: 84 MMHG | BODY MASS INDEX: 47.95 KG/M2 | OXYGEN SATURATION: 98 %

## 2024-11-27 DIAGNOSIS — M17.0 BILATERAL PRIMARY OSTEOARTHRITIS OF KNEE: ICD-10-CM

## 2024-11-27 PROCEDURE — G2211 COMPLEX E/M VISIT ADD ON: CPT

## 2024-11-27 PROCEDURE — 99214 OFFICE O/P EST MOD 30 MIN: CPT

## 2024-12-04 NOTE — ED PROVIDER NOTE - CARDIOVASCULAR NEGATIVE STATEMENT, MLM
GI Discharge Instructions      12/4/2024    During your exam, the physician:    Saw solid stool. Procedure will have to be rescheduled.     DIET INSTRUCTIONS:  Resume your regular diet    PRESCRIPTIONS/MEDICATIONS  No new prescriptions given today    A RESPONSIBLE ADULT MUST ACCOMPANY YOU AND DRIVE YOU HOME    You had the following procedure(s) today:   Colonoscopy    1. If a biopsy and/or a polyp removal was performed today. There is no need to hold any aspirin or anti-inflammatory medications.   2. Following sedation, your judgement, perception and coordination are impaired.      Therefore, FOR 24 HOURS:  Don't drive or use heavy equipment.  Don't make important decisions or sign documents.  Don't drink alcohol.  Have someone stay with you, if possible. They can watch for problems and help keep you safe    Please call your physician in the event that you experience any of the following or proceed to the nearest hospital in the event of an emergency:     COLONOSCOPY  Fever  Severe abdominal distention or pain. Some mild distention and/or cramping are normal after these procedures but should pass within an hour or two with the passage of air.  Rectal bleeding more than blood streaking on the toilet  tissue  Nausea or Vomiting    If you have any questions or concerns, contact Dr. Sagar Wright.    ADDITIONAL INSTRUCTIONS: Will have to reschedule due to poor prep.       
no chest pain and no edema.

## 2024-12-10 DIAGNOSIS — M15.9 POLYOSTEOARTHRITIS, UNSPECIFIED: ICD-10-CM

## 2024-12-10 RX ORDER — DULOXETINE HYDROCHLORIDE 20 MG/1
20 CAPSULE, DELAYED RELEASE PELLETS ORAL
Qty: 180 | Refills: 1 | Status: ACTIVE | COMMUNITY
Start: 2024-12-10 | End: 1900-01-01

## 2024-12-23 ENCOUNTER — APPOINTMENT (OUTPATIENT)
Dept: VASCULAR SURGERY | Facility: CLINIC | Age: 69
End: 2024-12-23
Payer: MEDICARE

## 2024-12-23 VITALS
RESPIRATION RATE: 16 BRPM | BODY MASS INDEX: 47.95 KG/M2 | SYSTOLIC BLOOD PRESSURE: 129 MMHG | OXYGEN SATURATION: 99 % | DIASTOLIC BLOOD PRESSURE: 82 MMHG | HEART RATE: 62 BPM | WEIGHT: 254 LBS | HEIGHT: 61 IN

## 2024-12-23 DIAGNOSIS — I87.2 VENOUS INSUFFICIENCY (CHRONIC) (PERIPHERAL): ICD-10-CM

## 2024-12-23 DIAGNOSIS — I89.0 LYMPHEDEMA, NOT ELSEWHERE CLASSIFIED: ICD-10-CM

## 2024-12-23 PROCEDURE — 99203 OFFICE O/P NEW LOW 30 MIN: CPT

## 2024-12-24 PROBLEM — F10.90 ALCOHOL USE: Status: ACTIVE | Noted: 2017-03-17

## 2025-01-06 ENCOUNTER — APPOINTMENT (OUTPATIENT)
Dept: ORTHOPEDIC SURGERY | Facility: CLINIC | Age: 70
End: 2025-01-06
Payer: MEDICARE

## 2025-01-06 VITALS — WEIGHT: 258 LBS | BODY MASS INDEX: 48.71 KG/M2 | HEIGHT: 61 IN

## 2025-01-06 DIAGNOSIS — M54.16 RADICULOPATHY, LUMBAR REGION: ICD-10-CM

## 2025-01-06 DIAGNOSIS — M51.26 OTHER INTERVERTEBRAL DISC DISPLACEMENT, LUMBAR REGION: ICD-10-CM

## 2025-01-06 DIAGNOSIS — I89.0 LYMPHEDEMA, NOT ELSEWHERE CLASSIFIED: ICD-10-CM

## 2025-01-06 DIAGNOSIS — M43.10 SPONDYLOLISTHESIS, SITE UNSPECIFIED: ICD-10-CM

## 2025-01-06 DIAGNOSIS — Z96.652 PRESENCE OF LEFT ARTIFICIAL KNEE JOINT: ICD-10-CM

## 2025-01-06 DIAGNOSIS — M51.372 OTH INTVRT DISC DEGEN, LUMBOSACR W DISCOG BCK & LW EXTRM PN: ICD-10-CM

## 2025-01-06 PROCEDURE — 72170 X-RAY EXAM OF PELVIS: CPT

## 2025-01-06 PROCEDURE — 72110 X-RAY EXAM L-2 SPINE 4/>VWS: CPT

## 2025-01-06 PROCEDURE — 99214 OFFICE O/P EST MOD 30 MIN: CPT

## 2025-01-06 RX ORDER — GABAPENTIN 100 MG/1
100 CAPSULE ORAL
Qty: 30 | Refills: 2 | Status: ACTIVE | COMMUNITY
Start: 2025-01-06 | End: 1900-01-01

## 2025-01-06 RX ORDER — TIZANIDINE 2 MG/1
2 TABLET ORAL EVERY 8 HOURS
Qty: 30 | Refills: 2 | Status: ACTIVE | COMMUNITY
Start: 2025-01-06 | End: 1900-01-01

## 2025-01-22 ENCOUNTER — APPOINTMENT (OUTPATIENT)
Dept: MRI IMAGING | Facility: CLINIC | Age: 70
End: 2025-01-22

## 2025-01-26 ENCOUNTER — OUTPATIENT (OUTPATIENT)
Dept: OUTPATIENT SERVICES | Facility: HOSPITAL | Age: 70
LOS: 1 days | End: 2025-01-26
Payer: MEDICARE

## 2025-01-26 DIAGNOSIS — Z90.49 ACQUIRED ABSENCE OF OTHER SPECIFIED PARTS OF DIGESTIVE TRACT: Chronic | ICD-10-CM

## 2025-01-26 DIAGNOSIS — Z87.19 PERSONAL HISTORY OF OTHER DISEASES OF THE DIGESTIVE SYSTEM: Chronic | ICD-10-CM

## 2025-01-26 DIAGNOSIS — Z98.890 OTHER SPECIFIED POSTPROCEDURAL STATES: Chronic | ICD-10-CM

## 2025-01-26 DIAGNOSIS — Z98.84 BARIATRIC SURGERY STATUS: Chronic | ICD-10-CM

## 2025-01-26 DIAGNOSIS — Z96.652 PRESENCE OF LEFT ARTIFICIAL KNEE JOINT: Chronic | ICD-10-CM

## 2025-01-26 DIAGNOSIS — Z87.39 PERSONAL HISTORY OF OTHER DISEASES OF THE MUSCULOSKELETAL SYSTEM AND CONNECTIVE TISSUE: Chronic | ICD-10-CM

## 2025-01-26 DIAGNOSIS — M54.16 RADICULOPATHY, LUMBAR REGION: ICD-10-CM

## 2025-01-26 DIAGNOSIS — Z98.891 HISTORY OF UTERINE SCAR FROM PREVIOUS SURGERY: Chronic | ICD-10-CM

## 2025-01-26 PROCEDURE — 72148 MRI LUMBAR SPINE W/O DYE: CPT

## 2025-01-26 PROCEDURE — A9585: CPT

## 2025-01-26 PROCEDURE — 73720 MRI LWR EXTREMITY W/O&W/DYE: CPT

## 2025-02-04 ENCOUNTER — APPOINTMENT (OUTPATIENT)
Dept: RADIATION ONCOLOGY | Facility: CLINIC | Age: 70
End: 2025-02-04
Payer: MEDICARE

## 2025-02-04 ENCOUNTER — NON-APPOINTMENT (OUTPATIENT)
Age: 70
End: 2025-02-04

## 2025-02-04 DIAGNOSIS — D42.0 NEOPLASM OF UNCERTAIN BEHAVIOR OF CEREBRAL MENINGES: ICD-10-CM

## 2025-02-04 PROCEDURE — 99213 OFFICE O/P EST LOW 20 MIN: CPT

## 2025-02-05 ENCOUNTER — APPOINTMENT (OUTPATIENT)
Dept: ORTHOPEDIC SURGERY | Facility: CLINIC | Age: 70
End: 2025-02-05
Payer: MEDICARE

## 2025-02-05 ENCOUNTER — APPOINTMENT (OUTPATIENT)
Dept: ORTHOPEDIC SURGERY | Facility: CLINIC | Age: 70
End: 2025-02-05

## 2025-02-05 VITALS — BODY MASS INDEX: 48.71 KG/M2 | WEIGHT: 258 LBS | HEIGHT: 61 IN

## 2025-02-05 DIAGNOSIS — M51.372 OTH INTVRT DISC DEGEN, LUMBOSACR W DISCOG BCK & LW EXTRM PN: ICD-10-CM

## 2025-02-05 DIAGNOSIS — G89.29 LUMBAGO WITH SCIATICA, RIGHT SIDE: ICD-10-CM

## 2025-02-05 DIAGNOSIS — M54.41 LUMBAGO WITH SCIATICA, RIGHT SIDE: ICD-10-CM

## 2025-02-05 DIAGNOSIS — M43.10 SPONDYLOLISTHESIS, SITE UNSPECIFIED: ICD-10-CM

## 2025-02-05 PROCEDURE — 99214 OFFICE O/P EST MOD 30 MIN: CPT

## 2025-02-13 ENCOUNTER — TRANSCRIPTION ENCOUNTER (OUTPATIENT)
Age: 70
End: 2025-02-13

## 2025-02-13 NOTE — ASU PATIENT PROFILE, ADULT - FALL HARM RISK - UNIVERSAL INTERVENTIONS
Bed in lowest position, wheels locked, appropriate side rails in place/Call bell, personal items and telephone in reach/Instruct patient to call for assistance before getting out of bed or chair/Non-slip footwear when patient is out of bed/[place] to call system/Physically safe environment - no spills, clutter or unnecessary equipment/Purposeful Proactive Rounding/Room/bathroom lighting operational, light cord in reach

## 2025-02-13 NOTE — ASU DISCHARGE PLAN (ADULT/PEDIATRIC) - NS MD DC FALL RISK RISK
For information on Fall & Injury Prevention, visit: [email or web address] OR  [email or web address] OR  https://www.cdc.gov/steadi/patient.html

## 2025-02-13 NOTE — ASU DISCHARGE PLAN (ADULT/PEDIATRIC) - DISCHARGE PLAN IS COMPLETE AND GIVEN TO PATIENT
Patient called and stated she would like to speak to Nani again. Patient states she has questions in regards to her after visit summary. Patient states there is a medication listed on the top of her AVS that says she should stop taking a certain medication. Patient states she does not know what this medication is. Please advise and return patient call to discuss.    : Yes

## 2025-02-13 NOTE — ASU DISCHARGE PLAN (ADULT/PEDIATRIC) - FINANCIAL ASSISTANCE
[facility] provides services at a reduced cost to those who are determined to be eligible through [facility]’s financial assistance program. Information regarding [facility]’s financial assistance program can be found by going to [email or web address] or by calling [phone].

## 2025-02-21 ENCOUNTER — OUTPATIENT (OUTPATIENT)
Dept: OUTPATIENT SERVICES | Facility: HOSPITAL | Age: 70
LOS: 1 days | End: 2025-02-21
Payer: MEDICARE

## 2025-02-21 ENCOUNTER — APPOINTMENT (OUTPATIENT)
Dept: ORTHOPEDIC SURGERY | Facility: HOSPITAL | Age: 70
End: 2025-02-21

## 2025-02-21 VITALS
SYSTOLIC BLOOD PRESSURE: 145 MMHG | WEIGHT: 255.07 LBS | DIASTOLIC BLOOD PRESSURE: 70 MMHG | TEMPERATURE: 98 F | RESPIRATION RATE: 14 BRPM | HEART RATE: 82 BPM | OXYGEN SATURATION: 97 % | HEIGHT: 63 IN

## 2025-02-21 VITALS
RESPIRATION RATE: 18 BRPM | WEIGHT: 255.07 LBS | HEIGHT: 63 IN | DIASTOLIC BLOOD PRESSURE: 70 MMHG | TEMPERATURE: 98 F | HEART RATE: 82 BPM | OXYGEN SATURATION: 97 % | SYSTOLIC BLOOD PRESSURE: 145 MMHG

## 2025-02-21 DIAGNOSIS — M43.10 SPONDYLOLISTHESIS, SITE UNSPECIFIED: ICD-10-CM

## 2025-02-21 DIAGNOSIS — Z98.890 OTHER SPECIFIED POSTPROCEDURAL STATES: Chronic | ICD-10-CM

## 2025-02-21 DIAGNOSIS — Z87.39 PERSONAL HISTORY OF OTHER DISEASES OF THE MUSCULOSKELETAL SYSTEM AND CONNECTIVE TISSUE: Chronic | ICD-10-CM

## 2025-02-21 DIAGNOSIS — Z96.652 PRESENCE OF LEFT ARTIFICIAL KNEE JOINT: Chronic | ICD-10-CM

## 2025-02-21 DIAGNOSIS — Z98.84 BARIATRIC SURGERY STATUS: Chronic | ICD-10-CM

## 2025-02-21 DIAGNOSIS — Z90.49 ACQUIRED ABSENCE OF OTHER SPECIFIED PARTS OF DIGESTIVE TRACT: Chronic | ICD-10-CM

## 2025-02-21 DIAGNOSIS — M51.26 OTHER INTERVERTEBRAL DISC DISPLACEMENT, LUMBAR REGION: ICD-10-CM

## 2025-02-21 DIAGNOSIS — M51.37 OTHER INTERVERTEBRAL DISC DEGENERATION, LUMBOSACRAL REGION: ICD-10-CM

## 2025-02-21 DIAGNOSIS — Z87.19 PERSONAL HISTORY OF OTHER DISEASES OF THE DIGESTIVE SYSTEM: Chronic | ICD-10-CM

## 2025-02-21 DIAGNOSIS — Z98.891 HISTORY OF UTERINE SCAR FROM PREVIOUS SURGERY: Chronic | ICD-10-CM

## 2025-02-21 PROCEDURE — 64483 NJX AA&/STRD TFRM EPI L/S 1: CPT | Mod: 50

## 2025-02-21 NOTE — H&P PST ADULT - HISTORY OF PRESENT ILLNESS
this is a 68 y/o female who has had low back, leg pain and feet pain for yrs; to have low back injection

## 2025-02-21 NOTE — ASU PREOP CHECKLIST - ALLERGY BAND ON
Supervision/assist with functional activities prn. Pt states she is looking into hiring aides and/or asking family for more assistance at home./Home OT done

## 2025-03-10 ENCOUNTER — APPOINTMENT (OUTPATIENT)
Dept: RHEUMATOLOGY | Facility: CLINIC | Age: 70
End: 2025-03-10

## 2025-03-10 ENCOUNTER — APPOINTMENT (OUTPATIENT)
Dept: ORTHOPEDIC SURGERY | Facility: CLINIC | Age: 70
End: 2025-03-10
Payer: MEDICARE

## 2025-03-10 ENCOUNTER — APPOINTMENT (OUTPATIENT)
Dept: PULMONOLOGY | Facility: CLINIC | Age: 70
End: 2025-03-10

## 2025-03-10 DIAGNOSIS — M43.10 SPONDYLOLISTHESIS, SITE UNSPECIFIED: ICD-10-CM

## 2025-03-10 DIAGNOSIS — M51.26 OTHER INTERVERTEBRAL DISC DISPLACEMENT, LUMBAR REGION: ICD-10-CM

## 2025-03-10 DIAGNOSIS — M54.16 RADICULOPATHY, LUMBAR REGION: ICD-10-CM

## 2025-03-10 PROCEDURE — 99213 OFFICE O/P EST LOW 20 MIN: CPT

## 2025-03-11 ENCOUNTER — APPOINTMENT (OUTPATIENT)
Dept: INTERNAL MEDICINE | Facility: CLINIC | Age: 70
End: 2025-03-11
Payer: MEDICARE

## 2025-03-11 VITALS
TEMPERATURE: 98.3 F | BODY MASS INDEX: 47.53 KG/M2 | HEIGHT: 61.5 IN | DIASTOLIC BLOOD PRESSURE: 76 MMHG | HEART RATE: 91 BPM | OXYGEN SATURATION: 97 % | WEIGHT: 255 LBS | SYSTOLIC BLOOD PRESSURE: 150 MMHG

## 2025-03-11 DIAGNOSIS — R91.8 OTHER NONSPECIFIC ABNORMAL FINDING OF LUNG FIELD: ICD-10-CM

## 2025-03-11 DIAGNOSIS — G47.33 OBSTRUCTIVE SLEEP APNEA (ADULT) (PEDIATRIC): ICD-10-CM

## 2025-03-11 DIAGNOSIS — E66.9 OBESITY, UNSPECIFIED: ICD-10-CM

## 2025-03-11 DIAGNOSIS — Z87.891 PERSONAL HISTORY OF NICOTINE DEPENDENCE: ICD-10-CM

## 2025-03-11 PROCEDURE — 99203 OFFICE O/P NEW LOW 30 MIN: CPT

## 2025-03-11 PROCEDURE — G2211 COMPLEX E/M VISIT ADD ON: CPT

## 2025-03-11 RX ORDER — ESOMEPRAZOLE MAGNESIUM 40 MG/1
40 CAPSULE, DELAYED RELEASE ORAL
Qty: 90 | Refills: 0 | Status: ACTIVE | COMMUNITY
Start: 2025-02-21

## 2025-03-11 RX ORDER — CEFUROXIME AXETIL 500 MG/1
500 TABLET, FILM COATED ORAL
Qty: 14 | Refills: 0 | Status: ACTIVE | COMMUNITY
Start: 2025-03-05

## 2025-03-24 ENCOUNTER — APPOINTMENT (OUTPATIENT)
Dept: VASCULAR SURGERY | Facility: CLINIC | Age: 70
End: 2025-03-24

## 2025-04-21 ENCOUNTER — APPOINTMENT (OUTPATIENT)
Dept: VASCULAR SURGERY | Facility: CLINIC | Age: 70
End: 2025-04-21
Payer: MEDICARE

## 2025-04-21 VITALS
HEART RATE: 74 BPM | HEIGHT: 61 IN | BODY MASS INDEX: 48.15 KG/M2 | OXYGEN SATURATION: 96 % | WEIGHT: 255 LBS | SYSTOLIC BLOOD PRESSURE: 163 MMHG | DIASTOLIC BLOOD PRESSURE: 93 MMHG

## 2025-04-21 DIAGNOSIS — I87.2 VENOUS INSUFFICIENCY (CHRONIC) (PERIPHERAL): ICD-10-CM

## 2025-04-21 DIAGNOSIS — I89.0 LYMPHEDEMA, NOT ELSEWHERE CLASSIFIED: ICD-10-CM

## 2025-04-21 PROCEDURE — 93970 EXTREMITY STUDY: CPT

## 2025-05-01 NOTE — ASU DISCHARGE PLAN (ADULT/PEDIATRIC) - SHOWER ONLY DURATION DAY(S)
Ambulatory Care Coordination Note     5/1/2025 12:05 PM     patient outreach attempt by this ACM today to offer care management services. ACM was unable to reach the patient by telephone today;   voicemail full and unable to leave a message.   [employer] message sent requesting patient to contact this AC.     Patient closed (unable to reach patient) from the High Risk Care Management program on 5/1/2025.  No further Ambulatory Care Manager follow up scheduled.        
shower from 5/24/2020

## 2025-07-21 ENCOUNTER — APPOINTMENT (OUTPATIENT)
Dept: VASCULAR SURGERY | Facility: CLINIC | Age: 70
End: 2025-07-21
Payer: MEDICARE

## 2025-07-21 VITALS — HEART RATE: 81 BPM | OXYGEN SATURATION: 97 % | SYSTOLIC BLOOD PRESSURE: 115 MMHG | DIASTOLIC BLOOD PRESSURE: 74 MMHG

## 2025-07-21 DIAGNOSIS — I87.2 VENOUS INSUFFICIENCY (CHRONIC) (PERIPHERAL): ICD-10-CM

## 2025-07-21 DIAGNOSIS — I89.0 LYMPHEDEMA, NOT ELSEWHERE CLASSIFIED: ICD-10-CM

## 2025-07-21 PROCEDURE — 99213 OFFICE O/P EST LOW 20 MIN: CPT

## 2025-09-03 ENCOUNTER — APPOINTMENT (OUTPATIENT)
Dept: ORTHOPEDIC SURGERY | Facility: CLINIC | Age: 70
End: 2025-09-03
Payer: MEDICARE

## 2025-09-03 VITALS — HEIGHT: 61 IN | BODY MASS INDEX: 48.15 KG/M2 | WEIGHT: 255 LBS

## 2025-09-03 DIAGNOSIS — M75.111 INCOMPLETE ROTATOR CUFF TEAR OR RUPTURE OF RIGHT SHOULDER, NOT SPECIFIED AS TRAUMATIC: ICD-10-CM

## 2025-09-03 DIAGNOSIS — M75.21 BICIPITAL TENDINITIS, RIGHT SHOULDER: ICD-10-CM

## 2025-09-03 DIAGNOSIS — M75.41 IMPINGEMENT SYNDROME OF RIGHT SHOULDER: ICD-10-CM

## 2025-09-03 PROCEDURE — 73030 X-RAY EXAM OF SHOULDER: CPT | Mod: RT

## 2025-09-03 PROCEDURE — 73010 X-RAY EXAM OF SHOULDER BLADE: CPT | Mod: RT

## 2025-09-03 PROCEDURE — 99214 OFFICE O/P EST MOD 30 MIN: CPT | Mod: 25

## 2025-09-03 PROCEDURE — 20611 DRAIN/INJ JOINT/BURSA W/US: CPT | Mod: RT

## 2025-09-03 RX ORDER — MELOXICAM 15 MG/1
15 TABLET ORAL
Qty: 30 | Refills: 0 | Status: ACTIVE | COMMUNITY
Start: 2025-09-03 | End: 1900-01-01

## 2025-09-13 ENCOUNTER — NON-APPOINTMENT (OUTPATIENT)
Age: 70
End: 2025-09-13

## 2025-09-15 ENCOUNTER — APPOINTMENT (OUTPATIENT)
Dept: INTERNAL MEDICINE | Facility: CLINIC | Age: 70
End: 2025-09-15
Payer: MEDICARE

## 2025-09-15 ENCOUNTER — APPOINTMENT (OUTPATIENT)
Dept: MRI IMAGING | Facility: CLINIC | Age: 70
End: 2025-09-15
Payer: MEDICARE

## 2025-09-15 VITALS
DIASTOLIC BLOOD PRESSURE: 70 MMHG | TEMPERATURE: 98.1 F | HEART RATE: 72 BPM | BODY MASS INDEX: 41.25 KG/M2 | HEIGHT: 61.5 IN | WEIGHT: 221.31 LBS | OXYGEN SATURATION: 97 % | SYSTOLIC BLOOD PRESSURE: 112 MMHG

## 2025-09-15 DIAGNOSIS — Z87.891 PERSONAL HISTORY OF NICOTINE DEPENDENCE: ICD-10-CM

## 2025-09-15 DIAGNOSIS — G47.33 OBSTRUCTIVE SLEEP APNEA (ADULT) (PEDIATRIC): ICD-10-CM

## 2025-09-15 DIAGNOSIS — E66.9 OBESITY, UNSPECIFIED: ICD-10-CM

## 2025-09-15 PROCEDURE — 70553 MRI BRAIN STEM W/O & W/DYE: CPT | Mod: 26

## 2025-09-15 PROCEDURE — G2211 COMPLEX E/M VISIT ADD ON: CPT

## 2025-09-15 PROCEDURE — 99214 OFFICE O/P EST MOD 30 MIN: CPT

## 2025-09-16 ENCOUNTER — LABORATORY RESULT (OUTPATIENT)
Age: 70
End: 2025-09-16

## 2025-09-23 PROBLEM — Z92.3 STATUS POST GAMMA KNIFE TREATMENT: Status: ACTIVE | Noted: 2025-09-23

## 2025-09-23 PROBLEM — D42.9: Status: ACTIVE | Noted: 2025-09-23

## (undated) DEVICE — MIDAS REX LEGEND BALL DIAMOND LG BORE 6.0MM X 9CM

## (undated) DEVICE — ACRA-CUT CRANIAL PERFORATOR ADULT 14MM X 11MM (WHITE)

## (undated) DEVICE — DRSG CURITY GAUZE SPONGE 4 X 4" 12-PLY

## (undated) DEVICE — DRAPE MICROSCOPE OPMI VISIONGUARD 48X118"

## (undated) DEVICE — PREP DURAPREP 26CC

## (undated) DEVICE — MIDAS REX LEGEND LUBRICANT DIFFUSER CARTRIDGE

## (undated) DEVICE — DRAPE MAYO STAND 23"

## (undated) DEVICE — BIPOLAR FORCEP STRYKER STANDARD 8" X 1MM (YELLOW)

## (undated) DEVICE — SHAVER BLADE S&N FULL RADIUS BONECUTTER PLATINUM 4.5MM (YELLOW)

## (undated) DEVICE — STAPLER SKIN VISI-STAT 35 WIDE

## (undated) DEVICE — CLIPPER BLADE NEURO (BLUE)

## (undated) DEVICE — POSITIONER PATIENT SAFETY STRAP 3X60"

## (undated) DEVICE — DRAPE SNAP KOVER 36X28

## (undated) DEVICE — MIDAS REX LEGEND BALL DIAMOND LG BORE 5.0MM X 9CM

## (undated) DEVICE — PACK SHOULDER

## (undated) DEVICE — DRSG TELFA 3 X 8

## (undated) DEVICE — LABELS BLANK W PEN

## (undated) DEVICE — NDL SPINAL 22G X 3.5" QUINCKE

## (undated) DEVICE — GLV 7.5 PROTEXIS (WHITE)

## (undated) DEVICE — GLV 8 PROTEXIS (BLUE)

## (undated) DEVICE — SUT ORTHOCORD COMPOSITE

## (undated) DEVICE — WARMING BLANKET UNDERBODY PEDS 36 X 33"

## (undated) DEVICE — VYCOR VIEWSITE BRAIN ACCESS SYSTEM (VBAS) DEVICE 21MM / 15MM / 7CM

## (undated) DEVICE — VENODYNE/SCD SLEEVE CALF MEDIUM

## (undated) DEVICE — ACRA-CUT CRANIAL PERFORATOR PEDS 11MM X 7MM MINI (BLUE)

## (undated) DEVICE — WARMING BLANKET LOWER ADULT

## (undated) DEVICE — BIPOLAR ISOCOOL TIP 2MM

## (undated) DEVICE — SUT PROLENE 0 30" CT-1

## (undated) DEVICE — SOL IRR POUR H2O 1500ML

## (undated) DEVICE — DRSG TAPE MICROFOAM 4"

## (undated) DEVICE — DRSG STERISTRIPS 0.5 X 4"

## (undated) DEVICE — TRAY EPIDURAL SINGLE DOSE

## (undated) DEVICE — DRSG 4 X 4" 6PLY STERILE

## (undated) DEVICE — DRAPE INSTRUMENT POUCH 6.75" X 11"

## (undated) DEVICE — SUT PDS II 1 96" XLH

## (undated) DEVICE — DRAPE PEDIATRIC DIRECTIONAL INCISION

## (undated) DEVICE — ELCTR GROUNDING PAD ADULT COVIDIEN

## (undated) DEVICE — DRSG XEROFORM 1 X 8"

## (undated) DEVICE — SYR LUER LOK 50CC

## (undated) DEVICE — DRAPE 3/4 SHEET 52X76"

## (undated) DEVICE — DRSG TAPE HYPAFIX 4"

## (undated) DEVICE — DRAIN JACKSON PRATT 7MM FLAT FULL W 15 FR TROCAR

## (undated) DEVICE — MIDAS REX LEGEND TAPERED FOOTED SM BORE 1.5MM X 8CM

## (undated) DEVICE — PREP DURAPREP 6CC

## (undated) DEVICE — CANNULA LINVATEC SHOULDER 7CM (GREY & ORANGE)

## (undated) DEVICE — COVER ULTRASOUND PROBE T-SHAPED INTRAOP SM

## (undated) DEVICE — SUT NUROLON 4-0 8-18" RB-1 (POP-OFF)

## (undated) DEVICE — ELCTR S&N SWITCHPEN WITH L-HOOK FOR FLUID

## (undated) DEVICE — POSITIONER FOAM EGG CRATE ULNAR 2PCS (PINK)

## (undated) DEVICE — Device

## (undated) DEVICE — TUBING DEPUY MITEK FMS OUTFLOW

## (undated) DEVICE — VAGINAL PACKING 2 X 6"

## (undated) DEVICE — LONE STAR RETRACTOR RING 12MM BLUNT DISP

## (undated) DEVICE — CANNULA S&N ARTHROSCOPY W OBTURATOR 8MM

## (undated) DEVICE — MARKING PEN W RULER

## (undated) DEVICE — DRAPE MICROSCOPE ZEISS

## (undated) DEVICE — SOL IRR BAG NS 0.9% 3000ML

## (undated) DEVICE — SUT VICRYL 3-0 18" SH UNDYED (POP-OFF)

## (undated) DEVICE — BUR S&N STONECUTTER ELITE 4MM STRAIGHT (MAROON)

## (undated) DEVICE — MIDAS REX LEGEND TAPERED SM BORE 2.3MM X 8CM

## (undated) DEVICE — KNIFE S&N ACUFEX BANANA SERRATED 3MM STRAIGHT

## (undated) DEVICE — PACK CRANIOTOMY  B

## (undated) DEVICE — PACK CRANIOTOMY  A

## (undated) DEVICE — POSITIONER STRAP ARMBOARD VELCRO TS-30

## (undated) DEVICE — DRAPE TOWEL BLUE 17" X 24"

## (undated) DEVICE — LIJ-MEDTRONIC STEALTH NAVIGATION: Type: DURABLE MEDICAL EQUIPMENT

## (undated) DEVICE — DRAPE 1/2 SHEET 40X57"

## (undated) DEVICE — DRAPE UTILITY SHEET 44X60"

## (undated) DEVICE — LIJ-STRYKER SONOPET: Type: DURABLE MEDICAL EQUIPMENT

## (undated) DEVICE — POSITIONER S&N FACE MASK SPIDER 2

## (undated) DEVICE — SUT ETHILON 3-0 18" FS-1

## (undated) DEVICE — ELCTR GROUNDING PAD INFANT COVIDIEN

## (undated) DEVICE — ELCTR WAND AMBIENT MEGA 90DRG

## (undated) DEVICE — SUT MONOCRYL 3-0 18" PS-2 UNDYED

## (undated) DEVICE — DRSG STOCKINETTE TUBULAR COTTON 3" NS

## (undated) DEVICE — SPHERE MARKER (5 SPHERES)

## (undated) DEVICE — CANISTER DISPOSABLE THIN WALL 3000CC

## (undated) DEVICE — CATH IV SAFE BC 18G X 1.16" (GREEN)

## (undated) DEVICE — SOL IRR POUR NS 0.9% 1500ML

## (undated) DEVICE — TUBING DEPUY MITEK FMS INFLOW

## (undated) DEVICE — VISITEC 4X4

## (undated) DEVICE — NDL SPINAL 18G X 3.5" (PINK)

## (undated) DEVICE — DRAIN RESERVOIR FOR JACKSON PRATT 100CC CARDINAL

## (undated) DEVICE — VYCOR VIEWSITE BRAIN ACCESS SYSTEM (VBAS) DEVICE 21MM / 15MM / 5CM